# Patient Record
Sex: MALE | Race: WHITE | Employment: OTHER | URBAN - METROPOLITAN AREA
[De-identification: names, ages, dates, MRNs, and addresses within clinical notes are randomized per-mention and may not be internally consistent; named-entity substitution may affect disease eponyms.]

---

## 2020-04-13 ENCOUNTER — APPOINTMENT (OUTPATIENT)
Dept: CT IMAGING | Age: 60
End: 2020-04-13
Payer: MEDICARE

## 2020-04-13 ENCOUNTER — APPOINTMENT (OUTPATIENT)
Dept: GENERAL RADIOLOGY | Age: 60
End: 2020-04-13
Payer: MEDICARE

## 2020-04-13 ENCOUNTER — HOSPITAL ENCOUNTER (EMERGENCY)
Age: 60
Discharge: NURSING, REHAB OR LONG-TERM CARE | End: 2020-04-13
Attending: EMERGENCY MEDICINE
Payer: MEDICARE

## 2020-04-13 VITALS
RESPIRATION RATE: 18 BRPM | SYSTOLIC BLOOD PRESSURE: 149 MMHG | HEART RATE: 94 BPM | OXYGEN SATURATION: 94 % | DIASTOLIC BLOOD PRESSURE: 87 MMHG

## 2020-04-13 LAB
ANION GAP SERPL CALCULATED.3IONS-SCNC: 11 MMOL/L (ref 7–16)
BASOPHILS ABSOLUTE: 0.01 E9/L (ref 0–0.2)
BASOPHILS RELATIVE PERCENT: 0.2 % (ref 0–2)
BUN BLDV-MCNC: 29 MG/DL (ref 8–23)
BURR CELLS: ABNORMAL
CALCIUM SERPL-MCNC: 8.5 MG/DL (ref 8.6–10.2)
CHLORIDE BLD-SCNC: 96 MMOL/L (ref 98–107)
CHP ED QC CHECK: YES
CO2: 26 MMOL/L (ref 22–29)
CREAT SERPL-MCNC: 2.6 MG/DL (ref 0.7–1.2)
EOSINOPHILS ABSOLUTE: 0.04 E9/L (ref 0.05–0.5)
EOSINOPHILS RELATIVE PERCENT: 0.8 % (ref 0–6)
GFR AFRICAN AMERICAN: 30
GFR NON-AFRICAN AMERICAN: 25 ML/MIN/1.73
GLUCOSE BLD-MCNC: 126 MG/DL
GLUCOSE BLD-MCNC: 144 MG/DL
GLUCOSE BLD-MCNC: 166 MG/DL
GLUCOSE BLD-MCNC: 178 MG/DL (ref 74–99)
HCT VFR BLD CALC: 34.3 % (ref 37–54)
HEMOGLOBIN: 10.8 G/DL (ref 12.5–16.5)
IMMATURE GRANULOCYTES #: 0.03 E9/L
IMMATURE GRANULOCYTES %: 0.6 % (ref 0–5)
LYMPHOCYTES ABSOLUTE: 0.32 E9/L (ref 1.5–4)
LYMPHOCYTES RELATIVE PERCENT: 6.4 % (ref 20–42)
MCH RBC QN AUTO: 30.7 PG (ref 26–35)
MCHC RBC AUTO-ENTMCNC: 31.5 % (ref 32–34.5)
MCV RBC AUTO: 97.4 FL (ref 80–99.9)
METER GLUCOSE: 126 MG/DL (ref 74–99)
METER GLUCOSE: 144 MG/DL (ref 74–99)
METER GLUCOSE: 166 MG/DL (ref 74–99)
MONOCYTES ABSOLUTE: 0.3 E9/L (ref 0.1–0.95)
MONOCYTES RELATIVE PERCENT: 6 % (ref 2–12)
NEUTROPHILS ABSOLUTE: 4.28 E9/L (ref 1.8–7.3)
NEUTROPHILS RELATIVE PERCENT: 86 % (ref 43–80)
OVALOCYTES: ABNORMAL
PDW BLD-RTO: 13.2 FL (ref 11.5–15)
PLATELET # BLD: 126 E9/L (ref 130–450)
PMV BLD AUTO: 10.5 FL (ref 7–12)
POIKILOCYTES: ABNORMAL
POTASSIUM SERPL-SCNC: 4 MMOL/L (ref 3.5–5)
RBC # BLD: 3.52 E12/L (ref 3.8–5.8)
SODIUM BLD-SCNC: 133 MMOL/L (ref 132–146)
TROPONIN: 0.01 NG/ML (ref 0–0.03)
WBC # BLD: 5 E9/L (ref 4.5–11.5)

## 2020-04-13 PROCEDURE — 99285 EMERGENCY DEPT VISIT HI MDM: CPT

## 2020-04-13 PROCEDURE — 84484 ASSAY OF TROPONIN QUANT: CPT

## 2020-04-13 PROCEDURE — 82962 GLUCOSE BLOOD TEST: CPT

## 2020-04-13 PROCEDURE — 93005 ELECTROCARDIOGRAM TRACING: CPT | Performed by: STUDENT IN AN ORGANIZED HEALTH CARE EDUCATION/TRAINING PROGRAM

## 2020-04-13 PROCEDURE — 85025 COMPLETE CBC W/AUTO DIFF WBC: CPT

## 2020-04-13 PROCEDURE — 80048 BASIC METABOLIC PNL TOTAL CA: CPT

## 2020-04-13 PROCEDURE — 70450 CT HEAD/BRAIN W/O DYE: CPT

## 2020-04-13 PROCEDURE — 71045 X-RAY EXAM CHEST 1 VIEW: CPT

## 2020-04-13 ASSESSMENT — ENCOUNTER SYMPTOMS
ABDOMINAL PAIN: 0
COUGH: 0
VOMITING: 0
NAUSEA: 0
SHORTNESS OF BREATH: 0
CONSTIPATION: 0
COLOR CHANGE: 0
WHEEZING: 0
DIARRHEA: 0

## 2020-04-13 NOTE — ED NOTES
Pt brought in by EMS for possible stroke. EMS state blood glucose 29. 500cc D10 given, EMS state pt began to wake up and talk. Blood glucose 166 on arrival to ED.       Melissa Gibson RN  04/13/20 1946

## 2020-04-13 NOTE — ED NOTES
Bed: 14  Expected date:   Expected time:   Means of arrival:   Comments:  kina Aguila, RN  04/13/20 1933

## 2020-04-14 LAB
EKG ATRIAL RATE: 54 BPM
EKG P AXIS: 42 DEGREES
EKG P-R INTERVAL: 188 MS
EKG Q-T INTERVAL: 480 MS
EKG QRS DURATION: 146 MS
EKG QTC CALCULATION (BAZETT): 455 MS
EKG R AXIS: -60 DEGREES
EKG VENTRICULAR RATE: 54 BPM

## 2020-04-14 PROCEDURE — 93010 ELECTROCARDIOGRAM REPORT: CPT | Performed by: INTERNAL MEDICINE

## 2020-04-14 NOTE — ED PROVIDER NOTES
hernia is present. Musculoskeletal: Normal range of motion. General: No tenderness or deformity. Comments: Right leg amputation. Lymphadenopathy:      Cervical: No cervical adenopathy. Skin:     General: Skin is warm and dry. Capillary Refill: Capillary refill takes less than 2 seconds. Coloration: Skin is not pale. Findings: Rash (Bilateral lower extremity erythema, no obvious open leg ulcers.) present. No erythema. Neurological:      General: No focal deficit present. Mental Status: He is oriented to person, place, and time. Mental status is at baseline. Cranial Nerves: No cranial nerve deficit. Sensory: No sensory deficit. Psychiatric:         Behavior: Behavior normal.         Thought Content: Thought content normal.         Judgment: Judgment normal.          Procedures   --------------------------------------------- PAST HISTORY ---------------------------------------------  Past Medical History:  has no past medical history on file. Past Surgical History:  has no past surgical history on file. Social History:      Family History: family history is not on file. The patients home medications have been reviewed. Allergies: Patient has no known allergies.     -------------------------------------------------- RESULTS -------------------------------------------------  Labs:  Results for orders placed or performed during the hospital encounter of 04/13/20   CBC auto differential   Result Value Ref Range    WBC 5.0 4.5 - 11.5 E9/L    RBC 3.52 (L) 3.80 - 5.80 E12/L    Hemoglobin 10.8 (L) 12.5 - 16.5 g/dL    Hematocrit 34.3 (L) 37.0 - 54.0 %    MCV 97.4 80.0 - 99.9 fL    MCH 30.7 26.0 - 35.0 pg    MCHC 31.5 (L) 32.0 - 34.5 %    RDW 13.2 11.5 - 15.0 fL    Platelets 140 (L) 709 - 450 E9/L    MPV 10.5 7.0 - 12.0 fL   Basic Metabolic Panel   Result Value Ref Range    Sodium 133 132 - 146 mmol/L    Potassium 4.0 3.5 - 5.0 mmol/L    Chloride 96 (L) 98 - 107 recheck one more time for his blood glucose, and discharge if appropriate. [KS]      ED Course User Index  [KS] Aure ByrdDO         9:43 PM EDT  I have spoken with the patient and discussed todays results, in addition to providing specific details for the plan of care and counseling regarding the diagnosis and prognosis. Their questions are answered at this time and they are agreeable with the plan. I discussed at length with them reasons for immediate return here for re evaluation. They will followup with their primary care physician by calling their office on Monday.      --------------------------------- ADDITIONAL PROVIDER NOTES ---------------------------------  At this time the patient is without objective evidence of an acute process requiring hospitalization or inpatient management. They have remained hemodynamically stable throughout their entire ED visit and are stable for discharge with outpatient follow-up. The plan has been discussed in detail and they are aware of the specific conditions for emergent return, as well as the importance of follow-up. New Prescriptions    No medications on file       Diagnosis:  1. Hypoglycemia    2. Altered mental status, unspecified altered mental status type        Disposition:  Patient's disposition: Discharge to nursing home  Patient's condition is stable. MDM  Number of Diagnoses or Management Options  Altered mental status, unspecified altered mental status type:   Hypoglycemia:   Diagnosis management comments: Patient is a 58-year-old male who presented to the ED for hypoglycemia. Patient evaluated, rest comfortably otherwise no acute distress. His initial vitals were stable, and his glucose was 160s. Patient otherwise was resting comfortably. He had no focal neurologic deficits, NIH was 0. He was mildly lethargic when he came in, was responding to questions appropriately.   Patient was monitored in the ED, several repeat glucose checks

## 2020-09-03 ENCOUNTER — HOSPITAL ENCOUNTER (INPATIENT)
Age: 60
LOS: 8 days | Discharge: OTHER INSTITUTION | DRG: 378 | End: 2020-09-11
Attending: EMERGENCY MEDICINE | Admitting: INTERNAL MEDICINE
Payer: MEDICARE

## 2020-09-03 PROBLEM — F32.A DEPRESSION: Status: ACTIVE | Noted: 2020-09-03

## 2020-09-03 PROBLEM — E11.9 DIABETES MELLITUS (HCC): Status: ACTIVE | Noted: 2020-09-03

## 2020-09-03 PROBLEM — N17.9 ACUTE ON CHRONIC KIDNEY FAILURE (HCC): Status: ACTIVE | Noted: 2020-09-03

## 2020-09-03 PROBLEM — K92.2 GI BLEED: Status: ACTIVE | Noted: 2020-09-03

## 2020-09-03 PROBLEM — D62 ACUTE BLOOD LOSS ANEMIA: Status: ACTIVE | Noted: 2020-09-03

## 2020-09-03 PROBLEM — N18.9 ACUTE ON CHRONIC KIDNEY FAILURE (HCC): Status: ACTIVE | Noted: 2020-09-03

## 2020-09-03 PROBLEM — J44.9 COPD (CHRONIC OBSTRUCTIVE PULMONARY DISEASE) (HCC): Status: ACTIVE | Noted: 2020-09-03

## 2020-09-03 PROBLEM — F99 PSYCHIATRIC DISORDER: Status: ACTIVE | Noted: 2020-09-03

## 2020-09-03 PROBLEM — D61.818 PANCYTOPENIA (HCC): Status: ACTIVE | Noted: 2020-09-03

## 2020-09-03 PROBLEM — I50.9 CHF (CONGESTIVE HEART FAILURE) (HCC): Status: ACTIVE | Noted: 2020-09-03

## 2020-09-03 PROBLEM — Z72.0 TOBACCO ABUSE: Status: ACTIVE | Noted: 2020-09-03

## 2020-09-03 PROBLEM — E78.5 HLD (HYPERLIPIDEMIA): Status: ACTIVE | Noted: 2020-09-03

## 2020-09-03 PROBLEM — I10 ESSENTIAL HYPERTENSION: Status: ACTIVE | Noted: 2020-09-03

## 2020-09-03 LAB
ABO/RH: NORMAL
ANION GAP SERPL CALCULATED.3IONS-SCNC: 7 MMOL/L (ref 7–16)
ANISOCYTOSIS: ABNORMAL
ANTIBODY SCREEN: NORMAL
BASOPHILIC STIPPLING: ABNORMAL
BASOPHILS ABSOLUTE: 0.01 E9/L (ref 0–0.2)
BASOPHILS RELATIVE PERCENT: 0.3 % (ref 0–2)
BUN BLDV-MCNC: 57 MG/DL (ref 8–23)
CALCIUM SERPL-MCNC: 8.7 MG/DL (ref 8.6–10.2)
CHLORIDE BLD-SCNC: 99 MMOL/L (ref 98–107)
CO2: 29 MMOL/L (ref 22–29)
CREAT SERPL-MCNC: 4.3 MG/DL (ref 0.7–1.2)
EKG ATRIAL RATE: 74 BPM
EKG P AXIS: 36 DEGREES
EKG P-R INTERVAL: 184 MS
EKG Q-T INTERVAL: 412 MS
EKG QRS DURATION: 138 MS
EKG QTC CALCULATION (BAZETT): 457 MS
EKG R AXIS: -56 DEGREES
EKG T AXIS: 58 DEGREES
EKG VENTRICULAR RATE: 74 BPM
EOSINOPHILS ABSOLUTE: 0.1 E9/L (ref 0.05–0.5)
EOSINOPHILS RELATIVE PERCENT: 3.4 % (ref 0–6)
GFR AFRICAN AMERICAN: 17
GFR NON-AFRICAN AMERICAN: 14 ML/MIN/1.73
GLUCOSE BLD-MCNC: 101 MG/DL (ref 74–99)
HCT VFR BLD CALC: 20.6 % (ref 37–54)
HEMOGLOBIN: 6.3 G/DL (ref 12.5–16.5)
HYPOCHROMIA: ABNORMAL
IMMATURE GRANULOCYTES #: 0.01 E9/L
IMMATURE GRANULOCYTES %: 0.3 % (ref 0–5)
LYMPHOCYTES ABSOLUTE: 0.32 E9/L (ref 1.5–4)
LYMPHOCYTES RELATIVE PERCENT: 10.9 % (ref 20–42)
MCH RBC QN AUTO: 30.9 PG (ref 26–35)
MCHC RBC AUTO-ENTMCNC: 30.6 % (ref 32–34.5)
MCV RBC AUTO: 101 FL (ref 80–99.9)
METER GLUCOSE: 50 MG/DL (ref 74–99)
MONOCYTES ABSOLUTE: 0.24 E9/L (ref 0.1–0.95)
MONOCYTES RELATIVE PERCENT: 8.2 % (ref 2–12)
NEUTROPHILS ABSOLUTE: 2.25 E9/L (ref 1.8–7.3)
NEUTROPHILS RELATIVE PERCENT: 76.9 % (ref 43–80)
PDW BLD-RTO: 14.6 FL (ref 11.5–15)
PLATELET # BLD: 129 E9/L (ref 130–450)
PMV BLD AUTO: 10 FL (ref 7–12)
POIKILOCYTES: ABNORMAL
POLYCHROMASIA: ABNORMAL
POTASSIUM REFLEX MAGNESIUM: 4.4 MMOL/L (ref 3.5–5)
RBC # BLD: 2.04 E12/L (ref 3.8–5.8)
SARS-COV-2, NAAT: NOT DETECTED
SODIUM BLD-SCNC: 135 MMOL/L (ref 132–146)
TARGET CELLS: ABNORMAL
WBC # BLD: 2.9 E9/L (ref 4.5–11.5)

## 2020-09-03 PROCEDURE — U0002 COVID-19 LAB TEST NON-CDC: HCPCS

## 2020-09-03 PROCEDURE — 86901 BLOOD TYPING SEROLOGIC RH(D): CPT

## 2020-09-03 PROCEDURE — 6360000002 HC RX W HCPCS: Performed by: NURSE PRACTITIONER

## 2020-09-03 PROCEDURE — 36430 TRANSFUSION BLD/BLD COMPNT: CPT

## 2020-09-03 PROCEDURE — 80048 BASIC METABOLIC PNL TOTAL CA: CPT

## 2020-09-03 PROCEDURE — 6370000000 HC RX 637 (ALT 250 FOR IP): Performed by: INTERNAL MEDICINE

## 2020-09-03 PROCEDURE — P9016 RBC LEUKOCYTES REDUCED: HCPCS

## 2020-09-03 PROCEDURE — 85025 COMPLETE CBC W/AUTO DIFF WBC: CPT

## 2020-09-03 PROCEDURE — 82962 GLUCOSE BLOOD TEST: CPT

## 2020-09-03 PROCEDURE — 1200000000 HC SEMI PRIVATE

## 2020-09-03 PROCEDURE — 99285 EMERGENCY DEPT VISIT HI MDM: CPT

## 2020-09-03 PROCEDURE — 86920 COMPATIBILITY TEST SPIN: CPT

## 2020-09-03 PROCEDURE — 2580000003 HC RX 258: Performed by: EMERGENCY MEDICINE

## 2020-09-03 PROCEDURE — 2580000003 HC RX 258: Performed by: NURSE PRACTITIONER

## 2020-09-03 PROCEDURE — 99284 EMERGENCY DEPT VISIT MOD MDM: CPT

## 2020-09-03 PROCEDURE — C9113 INJ PANTOPRAZOLE SODIUM, VIA: HCPCS | Performed by: NURSE PRACTITIONER

## 2020-09-03 PROCEDURE — 86850 RBC ANTIBODY SCREEN: CPT

## 2020-09-03 PROCEDURE — 93005 ELECTROCARDIOGRAM TRACING: CPT | Performed by: EMERGENCY MEDICINE

## 2020-09-03 PROCEDURE — 86900 BLOOD TYPING SEROLOGIC ABO: CPT

## 2020-09-03 PROCEDURE — 93010 ELECTROCARDIOGRAM REPORT: CPT | Performed by: INTERNAL MEDICINE

## 2020-09-03 RX ORDER — TRIAMCINOLONE ACETONIDE 1 MG/G
CREAM TOPICAL 2 TIMES DAILY
Status: DISCONTINUED | OUTPATIENT
Start: 2020-09-03 | End: 2020-09-11 | Stop reason: HOSPADM

## 2020-09-03 RX ORDER — 0.9 % SODIUM CHLORIDE 0.9 %
20 INTRAVENOUS SOLUTION INTRAVENOUS ONCE
Status: COMPLETED | OUTPATIENT
Start: 2020-09-03 | End: 2020-09-03

## 2020-09-03 RX ORDER — CITALOPRAM 10 MG/1
20 TABLET ORAL DAILY
COMMUNITY

## 2020-09-03 RX ORDER — OYSTER SHELL CALCIUM WITH VITAMIN D 500; 200 MG/1; [IU]/1
1 TABLET, FILM COATED ORAL 2 TIMES DAILY
Status: ON HOLD | COMMUNITY
End: 2020-12-31 | Stop reason: HOSPADM

## 2020-09-03 RX ORDER — FENOFIBRATE 54 MG/1
54 TABLET ORAL NIGHTLY
Status: DISCONTINUED | OUTPATIENT
Start: 2020-09-03 | End: 2020-09-11 | Stop reason: HOSPADM

## 2020-09-03 RX ORDER — ONDANSETRON 2 MG/ML
4 INJECTION INTRAMUSCULAR; INTRAVENOUS EVERY 6 HOURS PRN
Status: DISCONTINUED | OUTPATIENT
Start: 2020-09-03 | End: 2020-09-11 | Stop reason: HOSPADM

## 2020-09-03 RX ORDER — DILTIAZEM HYDROCHLORIDE 240 MG/1
240 CAPSULE, EXTENDED RELEASE ORAL DAILY
COMMUNITY

## 2020-09-03 RX ORDER — CEFUROXIME AXETIL 500 MG/1
500 TABLET ORAL 2 TIMES DAILY
COMMUNITY
Start: 2020-09-03 | End: 2020-09-09

## 2020-09-03 RX ORDER — SODIUM CHLORIDE 0.9 % (FLUSH) 0.9 %
10 SYRINGE (ML) INJECTION EVERY 12 HOURS SCHEDULED
Status: DISCONTINUED | OUTPATIENT
Start: 2020-09-03 | End: 2020-09-11 | Stop reason: HOSPADM

## 2020-09-03 RX ORDER — SODIUM CHLORIDE 0.9 % (FLUSH) 0.9 %
10 SYRINGE (ML) INJECTION PRN
Status: DISCONTINUED | OUTPATIENT
Start: 2020-09-03 | End: 2020-09-11 | Stop reason: HOSPADM

## 2020-09-03 RX ORDER — SODIUM CHLORIDE 9 MG/ML
10 INJECTION INTRAVENOUS DAILY
Status: DISCONTINUED | OUTPATIENT
Start: 2020-09-03 | End: 2020-09-04

## 2020-09-03 RX ORDER — PROMETHAZINE HYDROCHLORIDE 25 MG/1
12.5 TABLET ORAL EVERY 6 HOURS PRN
Status: DISCONTINUED | OUTPATIENT
Start: 2020-09-03 | End: 2020-09-11 | Stop reason: HOSPADM

## 2020-09-03 RX ORDER — CHOLECALCIFEROL (VITAMIN D3) 50 MCG
4000 TABLET ORAL DAILY
Status: DISCONTINUED | OUTPATIENT
Start: 2020-09-04 | End: 2020-09-11 | Stop reason: HOSPADM

## 2020-09-03 RX ORDER — FUROSEMIDE 40 MG/1
40 TABLET ORAL 2 TIMES DAILY
Status: ON HOLD | COMMUNITY
Start: 2020-09-03 | End: 2020-09-05 | Stop reason: HOSPADM

## 2020-09-03 RX ORDER — POLYETHYLENE GLYCOL 3350 17 G/17G
17 POWDER, FOR SOLUTION ORAL DAILY PRN
Status: DISCONTINUED | OUTPATIENT
Start: 2020-09-03 | End: 2020-09-11 | Stop reason: HOSPADM

## 2020-09-03 RX ORDER — DOXEPIN HYDROCHLORIDE 25 MG/1
25 CAPSULE ORAL NIGHTLY
COMMUNITY

## 2020-09-03 RX ORDER — DOXEPIN HYDROCHLORIDE 25 MG/1
25 CAPSULE ORAL NIGHTLY
Status: DISCONTINUED | OUTPATIENT
Start: 2020-09-03 | End: 2020-09-11 | Stop reason: HOSPADM

## 2020-09-03 RX ORDER — CHOLECALCIFEROL (VITAMIN D3) 50 MCG
1 CAPSULE ORAL DAILY
Status: ON HOLD | COMMUNITY
End: 2020-12-31 | Stop reason: HOSPADM

## 2020-09-03 RX ORDER — INSULIN DETEMIR 100 [IU]/ML
32 INJECTION, SOLUTION SUBCUTANEOUS EVERY MORNING
COMMUNITY

## 2020-09-03 RX ORDER — ARIPIPRAZOLE 2 MG/1
2.5 TABLET ORAL DAILY
COMMUNITY
End: 2020-09-03

## 2020-09-03 RX ORDER — GLIPIZIDE 5 MG/1
5 TABLET ORAL DAILY
Status: ON HOLD | COMMUNITY
End: 2020-09-05 | Stop reason: HOSPADM

## 2020-09-03 RX ORDER — FUROSEMIDE 20 MG/1
20 TABLET ORAL 2 TIMES DAILY
COMMUNITY
Start: 2020-09-08

## 2020-09-03 RX ORDER — CITALOPRAM 20 MG/1
20 TABLET ORAL DAILY
Status: DISCONTINUED | OUTPATIENT
Start: 2020-09-04 | End: 2020-09-11 | Stop reason: HOSPADM

## 2020-09-03 RX ORDER — FENOFIBRATE 48 MG/1
48 TABLET, COATED ORAL NIGHTLY
COMMUNITY

## 2020-09-03 RX ORDER — ARIPIPRAZOLE 5 MG/1
2.5 TABLET ORAL DAILY
COMMUNITY

## 2020-09-03 RX ORDER — ACETAMINOPHEN 325 MG/1
650 TABLET ORAL EVERY 6 HOURS PRN
Status: DISCONTINUED | OUTPATIENT
Start: 2020-09-03 | End: 2020-09-11 | Stop reason: HOSPADM

## 2020-09-03 RX ORDER — DEXTROSE MONOHYDRATE 50 MG/ML
100 INJECTION, SOLUTION INTRAVENOUS PRN
Status: DISCONTINUED | OUTPATIENT
Start: 2020-09-03 | End: 2020-09-11 | Stop reason: HOSPADM

## 2020-09-03 RX ORDER — 0.9 % SODIUM CHLORIDE 0.9 %
20 INTRAVENOUS SOLUTION INTRAVENOUS ONCE
Status: DISCONTINUED | OUTPATIENT
Start: 2020-09-03 | End: 2020-09-11 | Stop reason: HOSPADM

## 2020-09-03 RX ORDER — TRIAMCINOLONE ACETONIDE 1 MG/G
CREAM TOPICAL 2 TIMES DAILY
COMMUNITY

## 2020-09-03 RX ORDER — ACETAMINOPHEN 650 MG/1
650 SUPPOSITORY RECTAL EVERY 6 HOURS PRN
Status: DISCONTINUED | OUTPATIENT
Start: 2020-09-03 | End: 2020-09-11 | Stop reason: HOSPADM

## 2020-09-03 RX ORDER — ASPIRIN 81 MG/1
81 TABLET ORAL DAILY
Status: ON HOLD | COMMUNITY
End: 2020-09-05 | Stop reason: HOSPADM

## 2020-09-03 RX ORDER — ARIPIPRAZOLE 5 MG/1
2.5 TABLET ORAL DAILY
Status: DISCONTINUED | OUTPATIENT
Start: 2020-09-04 | End: 2020-09-11 | Stop reason: HOSPADM

## 2020-09-03 RX ORDER — DEXTROSE MONOHYDRATE 25 G/50ML
12.5 INJECTION, SOLUTION INTRAVENOUS PRN
Status: DISCONTINUED | OUTPATIENT
Start: 2020-09-03 | End: 2020-09-11 | Stop reason: HOSPADM

## 2020-09-03 RX ORDER — CEFUROXIME AXETIL 500 MG/1
500 TABLET ORAL 2 TIMES DAILY
Status: DISCONTINUED | OUTPATIENT
Start: 2020-09-03 | End: 2020-09-11 | Stop reason: HOSPADM

## 2020-09-03 RX ORDER — PANTOPRAZOLE SODIUM 40 MG/10ML
40 INJECTION, POWDER, LYOPHILIZED, FOR SOLUTION INTRAVENOUS DAILY
Status: DISCONTINUED | OUTPATIENT
Start: 2020-09-03 | End: 2020-09-04

## 2020-09-03 RX ORDER — INSULIN GLARGINE 100 [IU]/ML
69 INJECTION, SOLUTION SUBCUTANEOUS EVERY MORNING
Status: DISCONTINUED | OUTPATIENT
Start: 2020-09-04 | End: 2020-09-11 | Stop reason: HOSPADM

## 2020-09-03 RX ORDER — NICOTINE POLACRILEX 4 MG
15 LOZENGE BUCCAL PRN
Status: DISCONTINUED | OUTPATIENT
Start: 2020-09-03 | End: 2020-09-11 | Stop reason: HOSPADM

## 2020-09-03 RX ORDER — DILTIAZEM HYDROCHLORIDE 240 MG/1
240 CAPSULE, COATED, EXTENDED RELEASE ORAL DAILY
Status: DISCONTINUED | OUTPATIENT
Start: 2020-09-04 | End: 2020-09-11 | Stop reason: HOSPADM

## 2020-09-03 RX ORDER — CLONIDINE HYDROCHLORIDE 0.1 MG/1
0.1 TABLET ORAL 3 TIMES DAILY
COMMUNITY

## 2020-09-03 RX ORDER — CLONIDINE HYDROCHLORIDE 0.1 MG/1
0.1 TABLET ORAL 3 TIMES DAILY
Status: DISCONTINUED | OUTPATIENT
Start: 2020-09-03 | End: 2020-09-11 | Stop reason: HOSPADM

## 2020-09-03 RX ADMIN — PANTOPRAZOLE SODIUM 40 MG: 40 INJECTION, POWDER, FOR SOLUTION INTRAVENOUS at 23:29

## 2020-09-03 RX ADMIN — SODIUM CHLORIDE 10 ML: 9 INJECTION INTRAMUSCULAR; INTRAVENOUS; SUBCUTANEOUS at 23:41

## 2020-09-03 RX ADMIN — SODIUM CHLORIDE 20 ML: 9 INJECTION, SOLUTION INTRAVENOUS at 18:24

## 2020-09-03 RX ADMIN — TRIAMCINOLONE ACETONIDE: 1 CREAM TOPICAL at 23:27

## 2020-09-03 RX ADMIN — DEXTROSE MONOHYDRATE 12.5 G: 25 INJECTION, SOLUTION INTRAVENOUS at 23:45

## 2020-09-03 RX ADMIN — SODIUM CHLORIDE, PRESERVATIVE FREE 10 ML: 5 INJECTION INTRAVENOUS at 23:29

## 2020-09-03 ASSESSMENT — PAIN SCALES - GENERAL: PAINLEVEL_OUTOF10: 0

## 2020-09-03 NOTE — ED PROVIDER NOTES
Patient is a 68-year-old male with past medical history of CHF, COPD, diabetes, hyperlipidemia, hypertension presenting to the emergency department with abnormal lab work. Symptoms severe in severity and constant since onset. They are worsened by nothing. Patient lives at a assisted living facility and states he gets lab work monthly. He was called today and stated that his lab work was low, he thinks it is \"blood count\" and instructed to come to the emergency department for evaluation. Patient states he has no current complaints and feels \"okay\". He denies headache, blurry vision, changes in vision or syncope. He does not feel lightheaded. Upon questioning patient does admit to having dark black stools intermittently for last few weeks. He states he has had this in the past but is never been worked up. He denies having history of colonoscopy or EGD. He has tried nothing for his symptoms. Nothing makes it better nothing makes them worse. He has paperwork with him of prior lab work and her hemoglobin drop was greater than 7 and 1 before that was greater than 10. He also looks to have a baseline creatinine of 3. He denies any abdominal pain, diarrhea, constipation, nausea, vomiting, chest pain or shortness of breath. Review of Systems   Constitutional: Negative for chills, fatigue and fever. HENT: Negative for congestion. Eyes: Negative for photophobia and visual disturbance. Respiratory: Negative for cough, choking and chest tightness. Gastrointestinal: Negative for abdominal pain, constipation, diarrhea, nausea and vomiting. Genitourinary: Negative for decreased urine volume and difficulty urinating. Musculoskeletal: Negative for back pain, neck pain and neck stiffness. Skin: Negative for rash and wound. Neurological: Negative for dizziness, syncope, weakness and light-headedness. All other systems reviewed and are negative.        Physical Exam  Vitals signs and nursing note reviewed. Constitutional:       Appearance: Normal appearance. He is well-developed. He is not ill-appearing. HENT:      Head: Normocephalic and atraumatic. Eyes:      Pupils: Pupils are equal, round, and reactive to light. Comments: Pale conjunctiva   Neck:      Musculoskeletal: Normal range of motion and neck supple. Cardiovascular:      Rate and Rhythm: Normal rate and regular rhythm. Pulses: Normal pulses. Heart sounds: Normal heart sounds. No murmur. Pulmonary:      Effort: Pulmonary effort is normal. No respiratory distress. Breath sounds: Normal breath sounds. No wheezing or rales. Abdominal:      General: Bowel sounds are normal.      Palpations: Abdomen is soft. Tenderness: There is no abdominal tenderness. There is no guarding or rebound. Genitourinary:     Rectum: Guaiac result positive. Comments: Melanotic stool on rectal exam, hemoccult positive  Musculoskeletal:      Comments: Right BKA   Skin:     General: Skin is warm and dry. Capillary Refill: Capillary refill takes less than 2 seconds. Neurological:      General: No focal deficit present. Mental Status: He is alert and oriented to person, place, and time. Cranial Nerves: No cranial nerve deficit. Motor: No weakness. Procedures     MDM  Number of Diagnoses or Management Options  BENJY (acute kidney injury) ([facility] [address]): Anemia, unspecified type:   Pancytopenia [facility]):   Patient presents emergency department for abnormal lab work. He was found to have low hemoglobin outpatient. Patient is pale on arrival and Hemoccult positive. Work-up performed patient found to be anemic with a hemoglobin of 6.3. He was transfused 1 unit of blood in the department. Patient had no other active complaints. He had no chest pain or shortness of breath. Rest lab work also demonstrated pancytopenia with a leukocopenia of 2.9 and platelets of 344.   Patient has no history of active cancer. Patient's creatinine is also 4.3. With paperwork provided with him in the room, baseline Cr is around 3. With The patient's newfound anemia and pancytopenia as well as Hemoccult-positive stool, he would benefit from inpatient evaluation and care. He has never had a colonoscopy or EGD peformed. Consult placed to on-call hospitalist and spoke with advanced practice provider on call. They accepted patient admission. Because patient is from a facility, rapid cover test performed was negative. EKG did not demonstrate any acute new changes. Educated patient on symptoms, diagnosis and need to stay in the hospital for evaluation care. He verbalized understanding is agreeable to plan. Patient was admitted.          ----------------------------------------------- PAST HISTORY --------------------------------------------  Past Medical History:  has a past medical history of CHF (congestive heart failure) ([facility] [address] COPD (chronic obstructive pulmonary disease) ([facility] [address] [facility] Hyperlipidemia, and Hypertension. Past Surgical History:  has no past surgical history on file. Social History:  reports that he has been smoking cigarettes. He started smoking about 54 years ago. He has never used smokeless tobacco. He reports previous alcohol use. He reports that he does not use drugs. Family History: family history includes Breast Cancer in his mother; High Blood Pressure in his father. The patients home medications have been reviewed. Allergies: Patient has no known allergies.     ------------------------------------------------ RESULTS ---------------------------------------------------    LABS:  Results for orders placed or performed during the hospital encounter of 09/03/20   CBC auto differential   Result Value Ref Range    WBC 2.9 (L) 4.5 - 11.5 E9/L    RBC 2.04 (L) 3.80 - 5.80 E12/L    Hemoglobin 6.3 (LL) 12.5 - 16.5 g/dL    Hematocrit 20.6 (L) 37.0 - 54.0 %    .0 (H) 80.0 - 99.9 fL    MCH 30.9 26.0 - 35.0 pg    MCHC 30.6 (L) 32.0 - 34.5 %    RDW 14.6 11.5 - 15.0 fL    Platelets 005 (L) 788 - 450 E9/L    MPV 10.0 7.0 - 12.0 fL    Neutrophils % 76.9 43.0 - 80.0 %    Immature Granulocytes % 0.3 0.0 - 5.0 %    Lymphocytes % 10.9 (L) 20.0 - 42.0 %    Monocytes % 8.2 2.0 - 12.0 %    Eosinophils % 3.4 0.0 - 6.0 %    Basophils % 0.3 0.0 - 2.0 %    Neutrophils Absolute 2.25 1.80 - 7.30 E9/L    Immature Granulocytes # 0.01 E9/L    Lymphocytes Absolute 0.32 (L) 1.50 - 4.00 E9/L    Monocytes Absolute 0.24 0.10 - 0.95 E9/L    Eosinophils Absolute 0.10 0.05 - 0.50 E9/L    Basophils Absolute 0.01 0.00 - 0.20 E9/L    Anisocytosis 1+     Polychromasia 1+     Hypochromia 1+     Poikilocytes 1+     Target Cells 1+     Basophilic Stippling 1+    Basic Metabolic Panel w/ Reflex to MG   Result Value Ref Range    Sodium 135 132 - 146 mmol/L    Potassium reflex Magnesium 4.4 3.5 - 5.0 mmol/L    Chloride 99 98 - 107 mmol/L    CO2 29 22 - 29 mmol/L    Anion Gap 7 7 - 16 mmol/L    Glucose 101 (H) 74 - 99 mg/dL    BUN 57 (H) 8 - 23 mg/dL    CREATININE 4.3 (H) 0.7 - 1.2 mg/dL    GFR Non-African American 14 >=60 mL/min/1.73    GFR African American 17     Calcium 8.7 8.6 - 10.2 mg/dL   COVID-19   Result Value Ref Range    SARS-CoV-2, NAAT Not Detected Not Detected   POCT Glucose   Result Value Ref Range    Meter Glucose 50 (L) 74 - 99 mg/dL   POCT Glucose   Result Value Ref Range    Meter Glucose 89 74 - 99 mg/dL   EKG 12 Lead   Result Value Ref Range    Ventricular Rate 74 BPM    Atrial Rate 74 BPM    P-R Interval 184 ms    QRS Duration 138 ms    Q-T Interval 412 ms    QTc Calculation (Bazett) 457 ms    P Axis 36 degrees    R Axis -56 degrees    T Axis 58 degrees   TYPE AND SCREEN   Result Value Ref Range    ABO/Rh A POS     Antibody Screen NEG    PREPARE RBC (CROSSMATCH), 1 Units   Result Value Ref Range    Product Code Blood Bank [id number]     Description Blood Bank Red Blood Cells, Leuko-reduced     Unit Number (CHOLECALCIFEROL) tablet 4,000 Units (has no administration in time range)   cefUROXime (CEFTIN) tablet 500 mg (500 mg Oral Not Given 9/3/20 2341)   citalopram (CELEXA) tablet 20 mg (has no administration in time range)   cloNIDine (CATAPRES) tablet 0.1 mg (0.1 mg Oral Not Given 9/3/20 2341)   dilTIAZem (CARDIZEM CD) extended release capsule 240 mg (has no administration in time range)   doxepin (SINEQUAN) capsule 25 mg (25 mg Oral Not Given 9/3/20 2340)   fenofibrate (TRICOR) tablet 54 mg (54 mg Oral Not Given 9/3/20 2340)   insulin glargine (LANTUS) injection vial 69 Units (has no administration in time range)   triamcinolone (KENALOG) 0.1 % cream ( Topical Given 9/3/20 2327)   glucose (GLUTOSE) 40 % oral gel 15 g (has no administration in time range)   dextrose 50 % IV solution (12.5 g Intravenous Given 9/3/20 2345)   glucagon (rDNA) injection 1 mg (has no administration in time range)   dextrose 5 % solution (has no administration in time range)   0.9 % sodium chloride bolus (0 mLs Intravenous Held 9/3/20 2341)   dextrose 5 % and 0.9 % sodium chloride infusion (has no administration in time range)   0.9 % sodium chloride bolus (0 mLs Intravenous Stopped 9/3/20 2128)       CONSULTATIONS:            Consultation:  I Spoke with April, APP on with Dr. Rosa M Simons (Medicine). Discussed case. They will admit this patient. Haile Mayfield PROCEDURES:            none. COUNSELING:   I have spoken with the patient and discussed todays results, in addition to providing specific details for the plan of care and counseling regarding the diagnosis and prognosis.     --------------------------------------- IMPRESSION & DISPOSITION --------------------------------     IMPRESSION(s):  1. Pancytopenia ([address]    2. Anemia, unspecified type    3.  BENJY (acute kidney injury) ([facility] [address]        This patient's ED course included: a personal history and physicial examination, re-evaluation prior to disposition, cardiac monitoring and continuous pulse oximetry    This patient has been closely monitored during their ED course. DISPOSITION:  Disposition: Admit to telemetry. Patient condition is stable. END OF PROVIDER NOTE.        BONITA BLOOD [facility] So[facility] Medical CenterSHELTON Gordon DO  Resident  09/04/20 5028

## 2020-09-04 ENCOUNTER — ANESTHESIA (OUTPATIENT)
Dept: ENDOSCOPY | Age: 60
DRG: 378 | End: 2020-09-04
Payer: MEDICARE

## 2020-09-04 ENCOUNTER — ANESTHESIA EVENT (OUTPATIENT)
Dept: ENDOSCOPY | Age: 60
DRG: 378 | End: 2020-09-04
Payer: MEDICARE

## 2020-09-04 VITALS
RESPIRATION RATE: 18 BRPM | SYSTOLIC BLOOD PRESSURE: 141 MMHG | DIASTOLIC BLOOD PRESSURE: 75 MMHG | OXYGEN SATURATION: 90 %

## 2020-09-04 PROBLEM — E78.5 HLD (HYPERLIPIDEMIA): Status: RESOLVED | Noted: 2020-09-03 | Resolved: 2020-09-04

## 2020-09-04 PROBLEM — N18.4 CKD (CHRONIC KIDNEY DISEASE) STAGE 4, GFR 15-29 ML/MIN (HCC): Chronic | Status: ACTIVE | Noted: 2020-09-04

## 2020-09-04 PROBLEM — D61.818 PANCYTOPENIA (HCC): Status: RESOLVED | Noted: 2020-09-03 | Resolved: 2020-09-04

## 2020-09-04 PROBLEM — N18.9 ACUTE ON CHRONIC KIDNEY FAILURE (HCC): Status: RESOLVED | Noted: 2020-09-03 | Resolved: 2020-09-04

## 2020-09-04 PROBLEM — E78.5 HYPERLIPIDEMIA LDL GOAL <100: Chronic | Status: ACTIVE | Noted: 2020-09-04

## 2020-09-04 PROBLEM — N17.9 ACUTE ON CHRONIC KIDNEY FAILURE (HCC): Status: RESOLVED | Noted: 2020-09-03 | Resolved: 2020-09-04

## 2020-09-04 PROBLEM — N17.9 ACUTE KIDNEY INJURY (HCC): Status: ACTIVE | Noted: 2020-09-04

## 2020-09-04 PROBLEM — E11.9 DIABETES MELLITUS (HCC): Status: RESOLVED | Noted: 2020-09-03 | Resolved: 2020-09-04

## 2020-09-04 LAB
ALBUMIN SERPL-MCNC: 3.3 G/DL (ref 3.5–5.2)
ALP BLD-CCNC: 41 U/L (ref 40–129)
ALT SERPL-CCNC: 11 U/L (ref 0–40)
ANION GAP SERPL CALCULATED.3IONS-SCNC: 8 MMOL/L (ref 7–16)
AST SERPL-CCNC: 17 U/L (ref 0–39)
BILIRUB SERPL-MCNC: 0.4 MG/DL (ref 0–1.2)
BLOOD BANK DISPENSE STATUS: NORMAL
BLOOD BANK DISPENSE STATUS: NORMAL
BLOOD BANK PRODUCT CODE: NORMAL
BLOOD BANK PRODUCT CODE: NORMAL
BPU ID: NORMAL
BPU ID: NORMAL
BUN BLDV-MCNC: 53 MG/DL (ref 8–23)
CALCIUM SERPL-MCNC: 8.5 MG/DL (ref 8.6–10.2)
CHLORIDE BLD-SCNC: 101 MMOL/L (ref 98–107)
CHLORIDE URINE RANDOM: 25 MMOL/L
CO2: 27 MMOL/L (ref 22–29)
CREAT SERPL-MCNC: 4.1 MG/DL (ref 0.7–1.2)
CREATININE URINE: 67 MG/DL (ref 40–278)
DESCRIPTION BLOOD BANK: NORMAL
DESCRIPTION BLOOD BANK: NORMAL
FERRITIN: 77 NG/ML
FOLATE: 12.7 NG/ML (ref 4.8–24.2)
GFR AFRICAN AMERICAN: 18
GFR NON-AFRICAN AMERICAN: 15 ML/MIN/1.73
GLUCOSE BLD-MCNC: 45 MG/DL (ref 74–99)
HAPTOGLOBIN: 130 MG/DL (ref 30–200)
HCT VFR BLD CALC: 24.3 % (ref 37–54)
HCT VFR BLD CALC: 26.7 % (ref 37–54)
HEMOGLOBIN: 7.4 G/DL (ref 12.5–16.5)
IMMATURE RETIC FRACT: 21.5 % (ref 2.3–13.4)
IRON SATURATION: 19 % (ref 20–55)
IRON: 66 MCG/DL (ref 59–158)
LACTATE DEHYDROGENASE: 228 U/L (ref 135–225)
MAGNESIUM: 2.4 MG/DL (ref 1.6–2.6)
MCH RBC QN AUTO: 30.7 PG (ref 26–35)
MCHC RBC AUTO-ENTMCNC: 30.5 % (ref 32–34.5)
MCV RBC AUTO: 100.8 FL (ref 80–99.9)
METER GLUCOSE: 108 MG/DL (ref 74–99)
METER GLUCOSE: 109 MG/DL (ref 74–99)
METER GLUCOSE: 371 MG/DL (ref 74–99)
METER GLUCOSE: 425 MG/DL (ref 74–99)
METER GLUCOSE: 439 MG/DL (ref 74–99)
METER GLUCOSE: 60 MG/DL (ref 74–99)
METER GLUCOSE: 65 MG/DL (ref 74–99)
METER GLUCOSE: 73 MG/DL (ref 74–99)
METER GLUCOSE: 87 MG/DL (ref 74–99)
METER GLUCOSE: 89 MG/DL (ref 74–99)
METER GLUCOSE: 94 MG/DL (ref 74–99)
METER GLUCOSE: 99 MG/DL (ref 74–99)
PDW BLD-RTO: 14.5 FL (ref 11.5–15)
PLATELET # BLD: 121 E9/L (ref 130–450)
PMV BLD AUTO: 10.1 FL (ref 7–12)
POTASSIUM SERPL-SCNC: 4.1 MMOL/L (ref 3.5–5)
PROTEIN PROTEIN: 333 MG/DL (ref 0–12)
PROTEIN/CREAT RATIO: 5
PROTEIN/CREAT RATIO: 5 (ref 0–0.2)
RBC # BLD: 2.41 E12/L (ref 3.8–5.8)
RETIC HGB EQUIVALENT: 29 PG (ref 28.2–36.6)
RETICULOCYTE ABSOLUTE COUNT: 0.13 E12/L
RETICULOCYTE COUNT PCT: 5.6 % (ref 0.4–1.9)
SODIUM BLD-SCNC: 136 MMOL/L (ref 132–146)
SODIUM URINE: 32 MMOL/L
TOTAL IRON BINDING CAPACITY: 343 MCG/DL (ref 250–450)
TOTAL PROTEIN: 6.5 G/DL (ref 6.4–8.3)
VITAMIN B-12: 586 PG/ML (ref 211–946)
WBC # BLD: 3.3 E9/L (ref 4.5–11.5)

## 2020-09-04 PROCEDURE — 2700000000 HC OXYGEN THERAPY PER DAY

## 2020-09-04 PROCEDURE — 2580000003 HC RX 258: Performed by: INTERNAL MEDICINE

## 2020-09-04 PROCEDURE — 6360000002 HC RX W HCPCS: Performed by: SURGERY

## 2020-09-04 PROCEDURE — 0DJ08ZZ INSPECTION OF UPPER INTESTINAL TRACT, VIA NATURAL OR ARTIFICIAL OPENING ENDOSCOPIC: ICD-10-PCS | Performed by: SURGERY

## 2020-09-04 PROCEDURE — 83615 LACTATE (LD) (LDH) ENZYME: CPT

## 2020-09-04 PROCEDURE — 3700000001 HC ADD 15 MINUTES (ANESTHESIA): Performed by: SURGERY

## 2020-09-04 PROCEDURE — 36415 COLL VENOUS BLD VENIPUNCTURE: CPT

## 2020-09-04 PROCEDURE — 83735 ASSAY OF MAGNESIUM: CPT

## 2020-09-04 PROCEDURE — 84300 ASSAY OF URINE SODIUM: CPT

## 2020-09-04 PROCEDURE — 2580000003 HC RX 258: Performed by: SURGERY

## 2020-09-04 PROCEDURE — 3700000000 HC ANESTHESIA ATTENDED CARE: Performed by: SURGERY

## 2020-09-04 PROCEDURE — C9113 INJ PANTOPRAZOLE SODIUM, VIA: HCPCS | Performed by: SURGERY

## 2020-09-04 PROCEDURE — 80053 COMPREHEN METABOLIC PANEL: CPT

## 2020-09-04 PROCEDURE — 6360000002 HC RX W HCPCS: Performed by: NURSE ANESTHETIST, CERTIFIED REGISTERED

## 2020-09-04 PROCEDURE — 84165 PROTEIN E-PHORESIS SERUM: CPT

## 2020-09-04 PROCEDURE — 85045 AUTOMATED RETICULOCYTE COUNT: CPT

## 2020-09-04 PROCEDURE — 3609017100 HC EGD: Performed by: SURGERY

## 2020-09-04 PROCEDURE — 82962 GLUCOSE BLOOD TEST: CPT

## 2020-09-04 PROCEDURE — 83550 IRON BINDING TEST: CPT

## 2020-09-04 PROCEDURE — 83540 ASSAY OF IRON: CPT

## 2020-09-04 PROCEDURE — 36430 TRANSFUSION BLD/BLD COMPNT: CPT

## 2020-09-04 PROCEDURE — 97165 OT EVAL LOW COMPLEX 30 MIN: CPT

## 2020-09-04 PROCEDURE — 2580000003 HC RX 258: Performed by: NURSE PRACTITIONER

## 2020-09-04 PROCEDURE — 7100000011 HC PHASE II RECOVERY - ADDTL 15 MIN: Performed by: SURGERY

## 2020-09-04 PROCEDURE — 82728 ASSAY OF FERRITIN: CPT

## 2020-09-04 PROCEDURE — 82436 ASSAY OF URINE CHLORIDE: CPT

## 2020-09-04 PROCEDURE — P9016 RBC LEUKOCYTES REDUCED: HCPCS

## 2020-09-04 PROCEDURE — 84156 ASSAY OF PROTEIN URINE: CPT

## 2020-09-04 PROCEDURE — 83010 ASSAY OF HAPTOGLOBIN QUANT: CPT

## 2020-09-04 PROCEDURE — 82607 VITAMIN B-12: CPT

## 2020-09-04 PROCEDURE — 6370000000 HC RX 637 (ALT 250 FOR IP): Performed by: INTERNAL MEDICINE

## 2020-09-04 PROCEDURE — 7100000010 HC PHASE II RECOVERY - FIRST 15 MIN: Performed by: SURGERY

## 2020-09-04 PROCEDURE — 82570 ASSAY OF URINE CREATININE: CPT

## 2020-09-04 PROCEDURE — 6370000000 HC RX 637 (ALT 250 FOR IP): Performed by: SURGERY

## 2020-09-04 PROCEDURE — 80074 ACUTE HEPATITIS PANEL: CPT

## 2020-09-04 PROCEDURE — 97161 PT EVAL LOW COMPLEX 20 MIN: CPT

## 2020-09-04 PROCEDURE — 82746 ASSAY OF FOLIC ACID SERUM: CPT

## 2020-09-04 PROCEDURE — 2580000003 HC RX 258: Performed by: NURSE ANESTHETIST, CERTIFIED REGISTERED

## 2020-09-04 PROCEDURE — 1200000000 HC SEMI PRIVATE

## 2020-09-04 PROCEDURE — 2709999900 HC NON-CHARGEABLE SUPPLY: Performed by: SURGERY

## 2020-09-04 PROCEDURE — 85027 COMPLETE CBC AUTOMATED: CPT

## 2020-09-04 RX ORDER — PANTOPRAZOLE SODIUM 40 MG/1
40 TABLET, DELAYED RELEASE ORAL
Qty: 60 TABLET | Refills: 2 | Status: SHIPPED | OUTPATIENT
Start: 2020-09-04 | End: 2020-12-05

## 2020-09-04 RX ORDER — SODIUM CHLORIDE 9 MG/ML
10 INJECTION INTRAVENOUS 2 TIMES DAILY
Status: DISCONTINUED | OUTPATIENT
Start: 2020-09-04 | End: 2020-09-05

## 2020-09-04 RX ORDER — PROPOFOL 10 MG/ML
INJECTION, EMULSION INTRAVENOUS PRN
Status: DISCONTINUED | OUTPATIENT
Start: 2020-09-04 | End: 2020-09-04 | Stop reason: SDUPTHER

## 2020-09-04 RX ORDER — SODIUM CHLORIDE 9 MG/ML
INJECTION, SOLUTION INTRAVENOUS CONTINUOUS PRN
Status: DISCONTINUED | OUTPATIENT
Start: 2020-09-04 | End: 2020-09-04 | Stop reason: SDUPTHER

## 2020-09-04 RX ORDER — PANTOPRAZOLE SODIUM 40 MG/10ML
40 INJECTION, POWDER, LYOPHILIZED, FOR SOLUTION INTRAVENOUS 2 TIMES DAILY
Status: DISCONTINUED | OUTPATIENT
Start: 2020-09-04 | End: 2020-09-05

## 2020-09-04 RX ORDER — DEXTROSE AND SODIUM CHLORIDE 5; .9 G/100ML; G/100ML
INJECTION, SOLUTION INTRAVENOUS CONTINUOUS
Status: DISCONTINUED | OUTPATIENT
Start: 2020-09-04 | End: 2020-09-04

## 2020-09-04 RX ORDER — SODIUM CHLORIDE 450 MG/100ML
INJECTION, SOLUTION INTRAVENOUS CONTINUOUS
Status: DISCONTINUED | OUTPATIENT
Start: 2020-09-04 | End: 2020-09-05

## 2020-09-04 RX ADMIN — PANTOPRAZOLE SODIUM 40 MG: 40 INJECTION, POWDER, FOR SOLUTION INTRAVENOUS at 20:13

## 2020-09-04 RX ADMIN — SODIUM CHLORIDE: 9 INJECTION, SOLUTION INTRAVENOUS at 08:52

## 2020-09-04 RX ADMIN — SODIUM CHLORIDE, PRESERVATIVE FREE 10 ML: 5 INJECTION INTRAVENOUS at 20:13

## 2020-09-04 RX ADMIN — CEFUROXIME AXETIL 500 MG: 500 TABLET ORAL at 20:14

## 2020-09-04 RX ADMIN — PROPOFOL 110 MG: 10 INJECTION, EMULSION INTRAVENOUS at 08:55

## 2020-09-04 RX ADMIN — DEXTROSE MONOHYDRATE 12.5 G: 25 INJECTION, SOLUTION INTRAVENOUS at 06:17

## 2020-09-04 RX ADMIN — DEXTROSE AND SODIUM CHLORIDE: 5; 900 INJECTION, SOLUTION INTRAVENOUS at 04:46

## 2020-09-04 RX ADMIN — INSULIN LISPRO 7 UNITS: 100 INJECTION, SOLUTION INTRAVENOUS; SUBCUTANEOUS at 20:17

## 2020-09-04 RX ADMIN — TRIAMCINOLONE ACETONIDE: 1 CREAM TOPICAL at 20:13

## 2020-09-04 RX ADMIN — DOXEPIN HYDROCHLORIDE 25 MG: 25 CAPSULE ORAL at 20:14

## 2020-09-04 RX ADMIN — INSULIN LISPRO 18 UNITS: 100 INJECTION, SOLUTION INTRAVENOUS; SUBCUTANEOUS at 18:10

## 2020-09-04 RX ADMIN — TRIAMCINOLONE ACETONIDE: 1 CREAM TOPICAL at 08:18

## 2020-09-04 RX ADMIN — SODIUM CHLORIDE, PRESERVATIVE FREE 10 ML: 5 INJECTION INTRAVENOUS at 08:19

## 2020-09-04 RX ADMIN — SODIUM CHLORIDE, PRESERVATIVE FREE 10 ML: 5 INJECTION INTRAVENOUS at 20:19

## 2020-09-04 RX ADMIN — OYSTER SHELL CALCIUM WITH VITAMIN D 1 TABLET: 500; 200 TABLET, FILM COATED ORAL at 20:17

## 2020-09-04 RX ADMIN — SODIUM CHLORIDE: 4.5 INJECTION, SOLUTION INTRAVENOUS at 19:25

## 2020-09-04 RX ADMIN — FENOFIBRATE 54 MG: 54 TABLET ORAL at 20:18

## 2020-09-04 RX ADMIN — DEXTROSE MONOHYDRATE 12.5 G: 25 INJECTION, SOLUTION INTRAVENOUS at 01:26

## 2020-09-04 RX ADMIN — PANTOPRAZOLE SODIUM 40 MG: 40 INJECTION, POWDER, FOR SOLUTION INTRAVENOUS at 08:19

## 2020-09-04 RX ADMIN — SODIUM CHLORIDE, PRESERVATIVE FREE 10 ML: 5 INJECTION INTRAVENOUS at 08:20

## 2020-09-04 RX ADMIN — CLONIDINE HYDROCHLORIDE 0.1 MG: 0.1 TABLET ORAL at 20:16

## 2020-09-04 RX ADMIN — CLONIDINE HYDROCHLORIDE 0.1 MG: 0.1 TABLET ORAL at 14:17

## 2020-09-04 RX ADMIN — DEXTROSE MONOHYDRATE 12.5 G: 25 INJECTION, SOLUTION INTRAVENOUS at 08:56

## 2020-09-04 ASSESSMENT — PAIN DESCRIPTION - LOCATION
LOCATION: ABDOMEN;THROAT

## 2020-09-04 ASSESSMENT — PAIN SCALES - GENERAL
PAINLEVEL_OUTOF10: 0

## 2020-09-04 ASSESSMENT — ENCOUNTER SYMPTOMS
NAUSEA: 0
BACK PAIN: 0
COUGH: 0
CONSTIPATION: 0
ABDOMINAL PAIN: 0
DIARRHEA: 0
CHEST TIGHTNESS: 0
PHOTOPHOBIA: 0
CHOKING: 0
VOMITING: 0

## 2020-09-04 ASSESSMENT — PAIN DESCRIPTION - PROGRESSION
CLINICAL_PROGRESSION: NOT CHANGED
CLINICAL_PROGRESSION: NOT CHANGED

## 2020-09-04 ASSESSMENT — PAIN DESCRIPTION - ORIENTATION: ORIENTATION: INNER

## 2020-09-04 ASSESSMENT — LIFESTYLE VARIABLES: SMOKING_STATUS: 1

## 2020-09-04 ASSESSMENT — PAIN DESCRIPTION - PAIN TYPE
TYPE: ACUTE PAIN
TYPE: ACUTE PAIN

## 2020-09-04 NOTE — PROGRESS NOTES
Physical Therapy    Facility/Department: [address] MED SURG/TELE  Initial Assessment    NAME: Guanako Elliott  : 1960  MRN: [id number]    Date of Service: 2020      Attending Provider:  Jaci Taylor MD    Evaluating PT:  Lasha Rees. Aury Green P.T. Room #:  5652/5036-E  Diagnosis:  Pancytopenia  Pertinent PMHx/PSHx:  R BKA  Precautions:  Falls, Hgb 7.4 20    SUBJECTIVE:    Pt lives at [facility] and reports he hasn't ambulated for about 1 year. He reports his prosthesis no longer fits and even when it did made it difficult for him to get around due to high energy demands. He states it is easier and more efficient to get around in his WC. Staff assists him with transfers. OBJECTIVE:   Initial Evaluation  Date: 20 Treatment Short Term/ Long Term   Goals   Was pt agreeable to Eval/treatment? yes     Does pt have pain? No c/o pain at this time. Bed Mobility  Rolling: Independent  Supine to sit: NA  Sit to supine: Independent  Scooting: Independent   Independent    Transfers Sit to stand: MIN A  Stand to sit: MIN A  Stand pivot: MIN A  SBA   Ambulation   NA  NA   WC propulsion NA  150 feet Independent    AM-PAC 6 Clicks 32/       BLE ROM is WFL. LLE strength is grossly 4/5. Edema:  None noted  Balance: sitting is Independent and standing with no AD is MIN A  Endurance: fair    ASSESSMENT:    Comments:  Pt was found sitting up in his WC and wanted to get back into bed. He stood with no AD and MIN A and was able to pivot to bed and then get into bed on his own. Pt appears to be near his functional baseline, but will keep pt on our caseload to try and prevent deconditioning while he is admitted. Pt was left supine in bed with call light left by patient. Pt's/ family goals   1. To go back to ECF. Patient and or family understand(s) diagnosis, prognosis, and plan of care. PLAN:    PT care will be provided in accordance with the objectives noted above.  Exercises and functional mobility practice will be used as well as appropriate assistive devices or modalities to obtain goals. Patient and family education will also be administered as needed. Frequency of treatments: 2-5x/week x 1-2 weeks. Time in  14:30  Time out  14:45    Evaluation Time includes thorough review of current medical information, gathering information on past medical history/social history and prior level of function, completion of standardized testing/informal observation of tasks, assessment of data and education on plan of care and goals. CPT codes:  [x] Low Complexity PT evaluation 64997  [] Moderate Complexity PT evaluation 05328  [] High Complexity PT evaluation 34293  [] PT Re-evaluation 58350  [] Gait training 87519 ** minutes  [] Manual therapy 42762 ** minutes  [] Therapeutic activities 43249 ** minutes  [] Therapeutic exercises 89946 ** minutes  [] Neuromuscular reeducation 20081 ** minutes     Peter Bañuelos., P.T.   License Number: [id number]

## 2020-09-04 NOTE — PROGRESS NOTES
Occupational Therapy  OCCUPATIONAL THERAPY INITIAL EVALUATION      Date:2020  Patient Name: Nely Ham  MRN: [id number]  : 1960  Room: [address] [place] [address]    Referring Provider: Kate Zamudio MD   Evaluating OT: Sancho Flanagan OTR/L - [id number]    AM-PAC Daily Activity Raw Score:       Diagnosis: Pancytopenia ([facility] [address] [W09.700]  Pertinent Medical History: COPD, R BKA, CHF, DM, HTN      Precautions: falls, R BKA (patient does not wear a prosthesis)    Home Living: Patient is a resident of [facility]. Prior Level of Function (PLOF): Per patient, he was mostly independent with ADLs, needed assistance with IADLs, and needed assistance with functional transfers prior to this hospitalization. Pain Level: No complaints of pain. Cognition: Patient alert and oriented x3. WFL command follow demonstrated. Patient pleasant, cooperative, and indicated his intent to return to [facility]. Memory: WFL   Sequencing: WFL   Problem Solving: WFL grossly   Judgement/Safety: WFL grossly    Functional Assessment:   Initial Eval Status  Date: 2020   Feeding Independent   Grooming Setup  (seated)   UB Dressing Setup   LB Dressing Min A   Bathing Min A   Toileting Min A   Bed Mobility  Not assessed. Functional Transfers Sit-to-Stand: CGA   from bedside chair  Stand-Pivot Transfer: Not assessed. Patient reported that staff members at the [facility] assist with functional transfers, as needed. Functional Mobility Not assessed. Patient uses manual wheelchair at the [facility]. Balance Sitting: Good  (at edge of chair)  Standing: Fair-  (without device)   Activity Tolerance Fair   Visual/  Perceptual WFL  Patient wears reading glasses, as needed. Strength: ROM: Additional Information:    R UE  WFL WFL    L UE WFL  WFL      Hearing: WFL  Sensation: Patient denied experiencing numbness/tingling in B UEs. Tone: WFL  Edema: No    Comments: RN approved patient's participation in OT evaluation.  Upon arrival, patient seated

## 2020-09-04 NOTE — ANESTHESIA POSTPROCEDURE EVALUATION
Department of Anesthesiology  Postprocedure Note    Patient: Robby Peterson  MRN: [id number]  YOB: 1960  Date of evaluation: 9/4/2020  Time:  11:52 AM     Procedure Summary     Date:  09/04/20 Room / Location:  [facility] 03 / [facility]    Anesthesia Start:  6555 Anesthesia Stop:  4658    Procedure:  EGD ESOPHAGOGASTRODUODENOSCOPY (N/A ) Diagnosis:  (unk)    Surgeon:  Rick Aaron MD Responsible Provider:  Jettie Bamberger, MD    Anesthesia Type:  MAC ASA Status:  4          Anesthesia Type: No value filed. Johanna Phase I:      Johanna Phase II: Johanna Score: 9    Last vitals: Reviewed and per EMR flowsheets.        Anesthesia Post Evaluation    Patient location during evaluation: PACU  Patient participation: complete - patient participated  Level of consciousness: awake  Airway patency: patent  Nausea & Vomiting: no vomiting and no nausea  Complications: no  Cardiovascular status: hemodynamically stable  Respiratory status: acceptable  Hydration status: stable

## 2020-09-04 NOTE — OP NOTE
Operative Note      Patient: Servando Aranda  YOB: 1960  MRN: [id number]    Date of Procedure: 9/4/2020    Pre-Op Diagnosis: melena, anemia    Post-Op Diagnosis: Gastritis, duodenitis, possible Mayen's        Procedure(s):  EGD ESOPHAGOGASTRODUODENOSCOPY    Surgeon(s):  Aury Tovar MD    Assistant:   * No surgical staff found *    Anesthesia: Monitor Anesthesia Care    Estimated Blood Loss (mL): 0    Complications: none    Specimens:   * No specimens in log *    Implants:  * No implants in log *      Drains: * No LDAs found *      BRIEF HISTORY:  This is a 62 y.o. male who presents with the complaint of melena and anemia. It was recommended the patient undergo an esophagogastrduodenoscopy for further evaluation. The risks/benefits/alternatives/expected outcomes were explained to the patient which include but are not limited to, bleeding, perforation and aspiration. The patient understands and agrees to proceed. PROCEDURE:  The patient was brought into the endoscopy suite and placed in the left lateral decubitus position. A bite block was placed in the patients mouth. After the initiation of LMAC anesthesia, an endoscope was inserted into the patient's mouth and passed into the esophagus and into the stomach. The scope was advanced through the pylorus into the first and second portion of the duodenum making the note of duodenitis. The scope was then withdrawn back into the stomach where it was retroflexed. There was no hiatal hernia noted. The scope was then straightened and stomach was visualized and appeared mildly inflamed. The stomach was desufflated. The scope was then withdrawn the entire length of the esophagus, making the note of possible mayen's esophagus at the GE junction without masses, ulcerations, or lesions noted. The scope was withdrawn entirely. The patient tolerated the procedure well and there were no complications.   The patient was taken to PACU in stable condition.     No Almanzar MD  9/4/2020    Electronically signed by No Almanzar MD on 9/4/2020 at 9:08 AM

## 2020-09-04 NOTE — CONSULTS
Department of Medicine  Division of Hematology/Oncology  Attending Consult Note      Reason for Consult: Pancytopenia  Requesting Physician:  Rosario Duncan    CHIEF COMPLAINT: Abnormal lab work    History Obtained From:  Patient and EMR    HISTORY OF PRESENT ILLNESS:      The patient is a 62 y.o. male with significant past medical history of CHF, COPD, diabetes, hyperlipidemia and hypertension who presents with abnormal labs. Patient lives at assisted living, he gets monthly lab work done. Patient had no plates upon presentation but does report that he has been having dark black stools intermittently the past few weeks. He has never had colonoscopy or EGD. He was Hemoccult positive in the ED. Labs on admission showed a WBC 2.9, hemoglobin 6.3, hematocrit 20.6, .0, platelets 091,, sodium 135, BUN 57, creatinine 4.3, calcium 8.7. Lab work in April showed a WBC 5.0, hemoglobin 10.8, hematocrit 34.3, MCV 97.4, platelets 584, BMP showed a BUN of 29 and creatinine 2.6. We were subsequently consulted for pancytopenia. Upon evaluation patient does report a history of smoking since he was 4years old. He smokes about a half a pack to 1 pack a day. Patient also reports a history alcohol abuse. This consisted of either liquor or whiskey 5 nights out of the week. He reports breast cancer in his mother, denies any other oncological or hematological malignancies he is aware of in his family. Past Medical History:        Diagnosis Date    CHF (congestive heart failure) (HCC)     COPD (chronic obstructive pulmonary disease) (HCC)     Diabetes mellitus ([facility] [address]     Hyperlipidemia     Hypertension        Past Surgical History:    History reviewed. No pertinent surgical history.     Current Medications:    Current Facility-Administered Medications: dextrose 5 % and 0.9 % sodium chloride infusion, , Intravenous, Continuous  pantoprazole (PROTONIX) injection 40 mg, 40 mg, Intravenous, BID **AND** sodium chloride (PF) 0.9 % injection 10 mL, 10 mL, Intravenous, BID  sodium chloride flush 0.9 % injection 10 mL, 10 mL, Intravenous, 2 times per day  sodium chloride flush 0.9 % injection 10 mL, 10 mL, Intravenous, PRN  acetaminophen (TYLENOL) tablet 650 mg, 650 mg, Oral, Q6H PRN **OR** acetaminophen (TYLENOL) suppository 650 mg, 650 mg, Rectal, Q6H PRN  polyethylene glycol (GLYCOLAX) packet 17 g, 17 g, Oral, Daily PRN  promethazine (PHENERGAN) tablet 12.5 mg, 12.5 mg, Oral, Q6H PRN **OR** ondansetron (ZOFRAN) injection 4 mg, 4 mg, Intravenous, Q6H PRN  ARIPiprazole (ABILIFY) tablet 2.5 mg, 2.5 mg, Oral, Daily  calcium-vitamin D 500-200 MG-UNIT per tablet 1 tablet, 1 tablet, Oral, BID  vitamin D (CHOLECALCIFEROL) tablet 4,000 Units, 4,000 Units, Oral, Daily  cefUROXime (CEFTIN) tablet 500 mg, 500 mg, Oral, BID  citalopram (CELEXA) tablet 20 mg, 20 mg, Oral, Daily  cloNIDine (CATAPRES) tablet 0.1 mg, 0.1 mg, Oral, TID  dilTIAZem (CARDIZEM CD) extended release capsule 240 mg, 240 mg, Oral, Daily  doxepin (SINEQUAN) capsule 25 mg, 25 mg, Oral, Nightly  fenofibrate (TRICOR) tablet 54 mg, 54 mg, Oral, Nightly  insulin glargine (LANTUS) injection vial 69 Units, 69 Units, Subcutaneous, QAM  triamcinolone (KENALOG) 0.1 % cream, , Topical, BID  glucose (GLUTOSE) 40 % oral gel 15 g, 15 g, Oral, PRN  dextrose 50 % IV solution, 12.5 g, Intravenous, PRN  glucagon (rDNA) injection 1 mg, 1 mg, Intramuscular, PRN  dextrose 5 % solution, 100 mL/hr, Intravenous, PRN  0.9 % sodium chloride bolus, 20 mL, Intravenous, Once    Allergies:  Patient has no known allergies.     Social History:   Social History     Socioeconomic History    Marital status: Single     Spouse name: Not on file    Number of children: Not on file    Years of education: Not on file    Highest education level: Not on file   Occupational History    Not on file   Social Needs    Financial resource strain: Not on file    Food insecurity     Worry: Not on file Inability: Not on file    Transportation needs     Medical: Not on file     Non-medical: Not on file   Tobacco Use    Smoking status: Current Every Day Smoker     Types: Cigarettes     Start date: 9/3/1966    Smokeless tobacco: Never Used    Tobacco comment: states he smokes 7 cigarettes a day   Substance and Sexual Activity    Alcohol use: Not Currently    Drug use: Never    Sexual activity: Never   Lifestyle    Physical activity     Days per week: Not on file     Minutes per session: Not on file    Stress: Not on file   Relationships    Social connections     Talks on phone: Not on file     Gets together: Not on file     Attends Scientologist service: Not on file     Active member of club or organization: Not on file     Attends meetings of clubs or organizations: Not on file     Relationship status: Not on file    Intimate partner violence     Fear of current or ex partner: Not on file     Emotionally abused: Not on file     Physically abused: Not on file     Forced sexual activity: Not on file   Other Topics Concern    Not on file   Social History Narrative    Not on file       Family History:         Problem Relation Age of Onset    Breast Cancer Mother     High Blood Pressure Father        REVIEW OF SYSTEMS:    CONSTITUTIONAL:  negative for fatigue, fevers, chills, sweats and weight loss  HEENT:  negative for hearing loss, epistaxis and sore throat  RESPIRATORY:  negative for cough with sputum, dyspnea, wheezing, hemoptysis and chest pain  CARDIOVASCULAR:  negative for chest pain, palpitations, PND, edema, syncope, dyspnea  GASTROINTESTINAL:  negative for nausea, vomiting, diarrhea, constipation, abdominal pain, jaundice, reflux, hematemesis and hemtochezia  GENITOURINARY:  negative for frequency, dysuria and urinary incontinence  INTEGUMENT/BREAST:  negative for rash and pruritus  HEMATOLOGIC/LYMPHATIC:  negative for easy bruising, bleeding, lymphadenopathy and petechiae  MUSCULOSKELETAL:

## 2020-09-04 NOTE — PROGRESS NOTES
Database complete. Medications reconciled. Care plans and education initiated. Right BKA and no prosthetic with patient. Uses wjheelchair and wears 2L 02 NC.  Has large ecchymotic area to left breast.

## 2020-09-04 NOTE — PROGRESS NOTES
Bariatric bed received, patient refused to stay in bed, complaint of uncomfortable and inability to breathe when lying in bed. Patient educated on importance of using this bed. Still refused. Original bed replaced, MITCH bed placed at end of calderon by elevators for .

## 2020-09-04 NOTE — PROGRESS NOTES
Call placed to Dr. Chaparro Johansen regarding patients BS at dinner time. Will await orders or call back. New order for high dose sliding scale.

## 2020-09-04 NOTE — CARE COORDINATION
Social Work / Discharge Planning : Patient was admitted from [facility]. SW placed call to patient Legal Guardian  Glenna Fleming [phone] and left . Patient LG is from [employer]. SW called Dillon Santacruz and left  with admission jannet [phone]. Await  response . Patient will NOT need pre-cert due to listed medicare. Patient COVID negative 09/03. Cassidy PENA [address] and transport forms completed. SW to follow.  Electronically signed by JS Thomson on 9/4/20 at 11:30 AM EDT

## 2020-09-04 NOTE — PLAN OF CARE
Problem: Falls - Risk of:  Goal: Will remain free from falls  Description: Will remain free from falls  9/4/2020 0359 by Branden Oakley RN  Outcome: Met This Shift  9/3/2020 2041 by Favio Hill RN  Outcome: Met This Shift  Goal: Absence of physical injury  Description: Absence of physical injury  9/4/2020 0359 by Branden Oakley RN  Outcome: Met This Shift  9/3/2020 2041 by Favio Hill RN  Outcome: Met This Shift     Problem: Skin Integrity:  Goal: Will show no infection signs and symptoms  Description: Will show no infection signs and symptoms  9/3/2020 2041 by Favio Hill RN  Outcome: Met This Shift  Goal: Absence of new skin breakdown  Description: Absence of new skin breakdown  9/3/2020 2041 by Favio Hill RN  Outcome: Met This Shift     Problem: Pain:  Goal: Pain level will decrease  Description: Pain level will decrease  9/4/2020 0359 by Branden Oakley RN  Outcome: Met This Shift  9/3/2020 2041 by Favio Hill RN  Outcome: Met This Shift  Goal: Control of acute pain  Description: Control of acute pain  9/3/2020 2041 by Favio Hill RN  Outcome: Met This Shift  Goal: Control of chronic pain  Description: Control of chronic pain  9/3/2020 2041 by Favio Hill RN  Outcome: Met This Shift

## 2020-09-04 NOTE — H&P
tablet by mouth 2 times daily  triamcinolone (KENALOG) 0.1 % cream, Apply topically 2 times daily Apply topically 2 times daily. legs  Cholecalciferol (HM VITAMIN D3) 100 MCG (4000 UT) CAPS, Take 1 tablet by mouth daily    Allergies:    Patient has no known allergies. Social History:    reports that he has been smoking cigarettes. He started smoking about 54 years ago. He has never used smokeless tobacco. He reports previous alcohol use. He reports that he does not use drugs. Family History:   family history includes Breast Cancer in his mother; High Blood Pressure in his father. REVIEW OF SYSTEMS:  As above in the HPI, otherwise negative    PHYSICAL EXAM:    Vitals:  BP (!) 174/68 Comment: Manual  Pulse 88   Temp 97.6 °F (36.4 °C) (Oral)   Resp 20   Ht 5' 10.5\" (1.791 m)   Wt 233 lb (105.7 kg)   SpO2 92%   BMI 32.96 kg/m²     General:  Awake, alert, oriented X 3. Frail appearing. HEENT:  Normocephalic, atraumatic. Pupils equal, round, reactive to light. No scleral icterus. No conjunctival injection. Normal lips, teeth, and gums. No nasal discharge. Neck:  Supple  Heart:  RRR, no murmurs, gallops, rubs  Lungs:  CTA bilaterally, bilat symmetrical expansion, no wheeze, rales, or rhonchi  Abdomen:  Obese. Bowel sounds present, soft, nontender, no masses, no organomegaly, no peritoneal signs  Extremities:  No clubbing, cyanosis, or edema  Skin:  Warm and dry, no open lesions or rash  Neuro:  Cranial nerves 2-12 intact, no focal deficits. General physical deconditioning.     Breast: deferred  Rectal: deferred  Genitalia:  deferred    LABS:    CBC with Differential:    Lab Results   Component Value Date    WBC 3.3 09/04/2020    RBC 2.41 09/04/2020    HGB 7.4 09/04/2020    HCT 24.3 09/04/2020     09/04/2020    .8 09/04/2020    MCH 30.7 09/04/2020    MCHC 30.5 09/04/2020    RDW 14.5 09/04/2020    LYMPHOPCT 10.9 09/03/2020    MONOPCT 8.2 09/03/2020    BASOPCT 0.3 09/03/2020    MONOSABS goal <100    CKD (chronic kidney disease) stage 4, GFR 15-29 ml/min (HCC)    Acute kidney injury ([facility] [address]         PLAN:    General surgery consulted for evaluation. Evaluate for acute blood loss anemia secondary to GI bleed on top of anemia of chronic disease (CKD). As above. Continue breathing treatments and supplemental oxygen. Discussed cessation. Euvolemic. Continue psychiatric medications. As above. Blood glucose ok, continue to adjust basal/bolus insulin therapy  Continue aggressive lipid therapy  Nephrology consulted. As above.   Pt/Ot evaluations for discharge planning    Chaka Turner MD  9:12 AM  9/4/2020

## 2020-09-04 NOTE — CONSULTS
GENERAL SURGERY  CONSULT NOTE            Date: 9/4/2020        Patient Name: Kassi Parikh     YOB: 1960      Age:  62 y.o. Consult by: Rosario GARCÍA  Consult to: Dr Asiya Huff  Reason:  GIB    Chief Complaint     Chief Complaint   Patient presents with    Other     abnormal labs     History Obtained From   patient    History of Present Illness   Kassi Parikh is a 62 y.o. male with tobacco use, CKD, and no prior scopes who presents for incidental anemia on monthly labwork. It is 6.3 on presentation here, and it was 10.8 this April. He is asymptomatic except for intermittent melena for few days. No hx of reflux/heartburn or NSAID use. Denies weight loss. Past Medical History     Past Medical History:   Diagnosis Date    [facility]congestive heart failure) ([facility] [address]     COPD [facility] disease) ([facility]     Diabetes mellitus (HCC)     Hyperlipidemia     Hypertension         Past Surgical History   History reviewed. No pertinent surgical history. Medications - Prior to Admission and Current Meds     Prior to Admission medications    Medication Sig Start Date End Date Taking?  Authorizing Provider   ARIPiprazole (ABILIFY) 5 MG tablet Take 2.5 mg by mouth daily   Yes Historical Provider, MD   aspirin 81 MG EC tablet Take 81 mg by mouth daily   Yes Historical Provider, MD   cefUROXime (CEFTIN) 500 MG tablet Take 500 mg by mouth 2 times daily 9/3/20 9/9/20 Yes Historical Provider, MD   citalopram (CELEXA) 10 MG tablet Take 20 mg by mouth daily   Yes Historical Provider, MD   cloNIDine (CATAPRES) 0.1 MG tablet Take 0.1 mg by mouth three times daily   Yes Historical Provider, MD   dilTIAZem (DILACOR XR) 240 MG extended release capsule Take 240 mg by mouth daily   Yes Historical Provider, MD   doxepin (SINEQUAN) 25 MG capsule Take 25 mg by mouth nightly   Yes Historical Provider, MD   fenofibrate (TRICOR) 48 MG tablet Take 48 mg by mouth nightly   Yes Historical Provider, MD (GLUTOSE) 40 % oral gel 15 g, PRN  dextrose 50 % IV solution, PRN  glucagon (rDNA) injection 1 mg, PRN  dextrose 5 % solution, PRN  0.9 % sodium chloride bolus, Once        Allergies   Patient has no known allergies. Social History     Social History     Tobacco History     Smoking Status  Current Every Day Smoker Smoking Start Date  9/3/1966 Smoking Tobacco Type  Cigarettes    Smokeless Tobacco Use  Never Used    Tobacco Comment  states he smokes 7 cigarettes a day          Alcohol History     Alcohol Use Status  Not Currently          Drug Use     Drug Use Status  Never          Sexual Activity     Sexually Active  Never                Family History     Family History   Problem Relation Age of Onset    Breast Cancer Mother     High Blood Pressure Father        Review of Systems   Pertinent ROS listed in HPI, all others negative    Physical Exam   BP (!) 171/74   Pulse 85   Temp 97.5 °F (36.4 °C) (Oral)   Resp 18   Ht 5' 10.5\" (1.791 m)   Wt 233 lb (105.7 kg)   SpO2 97%   BMI 32.96 kg/m²     GENERAL:  No acute distress. Alert and oriented. HEAD:  Normocephalic, atraumatic. EYES:  No scleral icterus. Conjugate gaze. ENT/NECK:  Trachea midline, no goiter. LUNGS:  No cough. Nonlabored breathing on 4LNC  CARDIOVASC:  Normal rate, no cyanosis. ABDOMEN:  Soft, non-distended, non-tender. LIMBS: R BKA, LLE edema with venous stasis changes. NEURO:  Face symmetric, moves all extremities  SKIN:  Warm, dry. Labs    CBC  Recent Labs     09/03/20  1643   WBC 2.9*   HGB 6.3*   HCT 20.6*   *     BMP  Recent Labs     09/03/20  1643      K 4.4   CL 99   CO2 29   BUN 57*   CREATININE 4.3*   CALCIUM 8.7     Liver Function  No results for input(s): AMYLASE, LIPASE, BILITOT, BILIDIR, AST, ALT, ALKPHOS, PROT, LABALBU in the last 72 hours. No results for input(s): LACTATE in the last 72 hours. No results for input(s): INR, PTT in the last 72 hours.     Invalid input(s): PT    Imaging/Diagnostics

## 2020-09-04 NOTE — CONSULTS
[facility] MD Jennifer Ybarra, MD Venice Pinto, MD Shakira Wilkins, MD Jose Bland, RUTH Read  Consultation  Patient's Name: Kin Rice  11:52 AM  9/4/2020    Nephrologist: Jennifer Baca MD    Reason for Consult: BENJY, CKD  Requesting Physician:  Jayant Evans DO    Chief Complaint: Fatigue, anemia    History Obtained From: Patient, chart    History of Present Ilness:    Mr. Izabela Childs is a pleasant 58-year-old gentleman who is a resident at assisted living and has a past medical history notable for diabetes, dyslipidemia, hypertension, smoking, COPD and history of alcohol abuse. Denies known renal disease. Routine laboratory studies revealed pancytopenia including WBC 2.9, hemoglobin 6.3, platelets 479, as well as elevated BUN/creatinine 57 and 4.3, respectively. Last known BUN/creatinine were 29 and 2.6, respectively, in April of this year. He is a rather vague historian, does not know medications he takes a much about his underlying medical diseases. He is received 2 units PRBCs. He underwent EGD today revealing duodenitis, gastritis, but no site of acute bleed. He is for colonoscopy in the near future. He denies NSAIDs. No recent course of antimicrobials known to him. Denies lightheadedness, chest pain or palpitation, shortness of breath such as exertion, cough wheeze or sputum production. No recent fever or chill. Does have fatigue generalized weakness. No recent diarrhea, constipation, melena hematochezia though he does note some dark stools recently. No dysuria or hematuria, no urinary hesitancy or other changes urinary habits. No flank pain. Denies mental or visual status changes.     Past Medical History:   Diagnosis Date    CHF (congestive heart failure) (HCC)     COPD (chronic obstructive pulmonary disease) (HCC)     Diabetes mellitus (HCC)     Hyperlipidemia     Hypertension        Past Surgical History: 10.5\" (1.791 m)   Wt 233 lb (105.7 kg)   SpO2 94%   BMI 32.96 kg/m²   Obese white gentleman who appears older than his stated age, in no apparent distress  NC/AT EOMI sclera and conjunctiva are clear and anicteric mucous membranes are moist  Neck soft supple trachea midline no carotid bruit no JVD  CTA bilateral  Regular rhythm normal S1 and S2, no murmur, no rub  Abdomen soft nontender nondistended normal active bowel sounds  Distal extremities reveal skin change consistent with chronic venous stasis, mild chronic-type edema, though no pitting edema  Pulses 1+x4  Moves all 4 extremities  Cranial nerves II through XII grossly intact  Awake alert appropriate, interactive  Besides skin changes in distal lower extremities as noted above, no rash or lesion on visible portions of integument    Data:   Labs:  CBC with Differential:    Lab Results   Component Value Date    WBC 3.3 09/04/2020    RBC 2.41 09/04/2020    HGB 7.4 09/04/2020    HCT 24.3 09/04/2020     09/04/2020    .8 09/04/2020    MCH 30.7 09/04/2020    MCHC 30.5 09/04/2020    RDW 14.5 09/04/2020    LYMPHOPCT 10.9 09/03/2020    MONOPCT 8.2 09/03/2020    BASOPCT 0.3 09/03/2020    MONOSABS 0.24 09/03/2020    LYMPHSABS 0.32 09/03/2020    EOSABS 0.10 09/03/2020    BASOSABS 0.01 09/03/2020     CMP:    Lab Results   Component Value Date     09/04/2020    K 4.1 09/04/2020    K 4.4 09/03/2020     09/04/2020    CO2 27 09/04/2020    BUN 53 09/04/2020    CREATININE 4.1 09/04/2020    GFRAA 18 09/04/2020    LABGLOM 15 09/04/2020    GLUCOSE 45 09/04/2020    PROT 6.5 09/04/2020    LABALBU 3.3 09/04/2020    CALCIUM 8.5 09/04/2020    BILITOT 0.4 09/04/2020    ALKPHOS 41 09/04/2020    AST 17 09/04/2020    ALT 11 09/04/2020     Ionized Calcium:  No results found for: IONCA  Magnesium:    Lab Results   Component Value Date    MG 2.4 09/04/2020         Imaging:  No orders to display       Assessment  1.  Acute kidney injury, presumptively hemodynamically

## 2020-09-04 NOTE — ANESTHESIA PRE PROCEDURE
Department of Anesthesiology  Preprocedure Note       Name:  Sujata Miller   Age:  62 y.o.  :  1960                                          MRN:  [id number]         Date:  2020      Surgeon: Celina Lawson):  Donald Pearce MD    Procedure: Procedure(s):  EGD ESOPHAGOGASTRODUODENOSCOPY    Medications prior to admission:   Prior to Admission medications    Medication Sig Start Date End Date Taking? Authorizing Provider   ARIPiprazole (ABILIFY) 5 MG tablet Take 2.5 mg by mouth daily   Yes Historical Provider, MD   aspirin 81 MG EC tablet Take 81 mg by mouth daily   Yes Historical Provider, MD   cefUROXime (CEFTIN) 500 MG tablet Take 500 mg by mouth 2 times daily 9/3/20 9/9/20 Yes Historical Provider, MD   citalopram (CELEXA) 10 MG tablet Take 20 mg by mouth daily   Yes Historical Provider, MD   cloNIDine (CATAPRES) 0.1 MG tablet Take 0.1 mg by mouth three times daily   Yes Historical Provider, MD   dilTIAZem (DILACOR XR) 240 MG extended release capsule Take 240 mg by mouth daily   Yes Historical Provider, MD   doxepin (SINEQUAN) 25 MG capsule Take 25 mg by mouth nightly   Yes Historical Provider, MD   fenofibrate (TRICOR) 48 MG tablet Take 48 mg by mouth nightly   Yes Historical Provider, MD   glipiZIDE (GLUCOTROL) 5 MG tablet Take 5 mg by mouth daily   Yes Historical Provider, MD   furosemide (LASIX) 40 MG tablet Take 40 mg by mouth 2 times daily 40 bid until 20. 9/3/20 9/7/20 Yes Historical Provider, MD   furosemide (LASIX) 20 MG tablet Take 20 mg by mouth 2 times daily Starts 20  Yes Historical Provider, MD   insulin detemir (LEVEMIR) 100 UNIT/ML injection vial Inject 69 Units into the skin every morning   Yes Historical Provider, MD   calcium-vitamin D (OSCAL-500) 500-200 MG-UNIT per tablet Take 1 tablet by mouth 2 times daily   Yes Historical Provider, MD   triamcinolone (KENALOG) 0.1 % cream Apply topically 2 times daily Apply topically 2 times daily.  legs   Yes Historical Provider, mg  0.1 mg Oral TID April RoslynRICKY Boyd - CNP        dilTIAZem (CARDIZEM CD) extended release capsule 240 mg  240 mg Oral Daily April RICKY Hartman - CNP        doxepin [facility]) capsule 25 mg  25 mg Oral Nightly April RICKY Hartman CNP        fenofibrate (TRICOR) tablet 54 mg  54 mg Oral Nightly April RICKY Hartman CNP        insulin glargine (LANTUS) injection vial 69 Units  69 Units Subcutaneous QAM April RICKY Hartman - AMY        triamcinolone (KENALOG) 0.1 % cream   Topical BID Liz Valdovinos MD        glucose (GLUTOSE) 40 % oral gel 15 g  15 g Oral PRN April RICKY Hartman CNP        dextrose 50 % IV solution  12.5 g Intravenous PRN April RoslynRICKY Landaverde - CNP   12.5 g at 09/04/20 0617    glucagon (rDNA) injection 1 mg  1 mg Intramuscular PRN April RICKY Hartman CNP        dextrose 5 % solution  100 mL/hr Intravenous PRN April RICKY Hartman - CNP        0.9 % sodium chloride bolus  20 mL Intravenous Once April RICKY Hartman - CNP   Stopped at 09/03/20 2341       Allergies:  No Known Allergies    Problem List:    Patient Active Problem List   Diagnosis Code    Pancytopenia ([facility] D61.818    GI bleed K92.2    Acute blood loss anemia D62    Acute on chronic kidney failure (HCC) N17.9, N18.9    Essential hypertension I10    COPD (chronic obstructive pulmonary disease) ([facility] J44.9    Diabetes mellitus ([facility] E11.9    Tobacco abuse Z72.0    HLD (hyperlipidemia) E78.5    CHF (congestive heart failure) ([facility] I50.9    Depression F32.9    Psychiatric disorder F99       Past Medical History:        Diagnosis Date    CHF (congestive heart failure) (HCC)     COPD (chronic obstructive pulmonary disease) ([facility]     Diabetes mellitus ([facility]     Hyperlipidemia     Hypertension        Past Surgical History:  History reviewed. No pertinent surgical history.     Social History:    Social History     Tobacco Use  Smoking status: Current Every Day Smoker     Types: Cigarettes     Start date: 9/3/1966    Smokeless tobacco: Never Used    Tobacco comment: states he smokes 7 cigarettes a day   Substance Use Topics    Alcohol use: Not Currently                                Ready to quit: Not Answered  Counseling given: Not Answered  Comment: states he smokes 7 cigarettes a day      Vital Signs (Current):   Vitals:    09/04/20 0615 09/04/20 0800 09/04/20 0825 09/04/20 0830   BP:  (!) 174/68     Pulse:  88     Resp:  20     Temp:  97.6 °F (36.4 °C)     TempSrc:  Oral     SpO2: 95% 96% (!) 88% 92%   Weight:       Height:                                                  BP Readings from Last 3 Encounters:   09/04/20 (!) 174/68   04/13/20 (!) 149/87       NPO Status:  greater than 8 hours                                                                               BMI:   Wt Readings from Last 3 Encounters:   09/03/20 233 lb (105.7 kg)     Body mass index is 32.96 kg/m². CBC:   Lab Results   Component Value Date    WBC 3.3 09/04/2020    RBC 2.41 09/04/2020    HGB 7.4 09/04/2020    HCT 24.3 09/04/2020    .8 09/04/2020    RDW 14.5 09/04/2020     09/04/2020       CMP:   Lab Results   Component Value Date     09/04/2020    K 4.1 09/04/2020    K 4.4 09/03/2020     09/04/2020    CO2 27 09/04/2020    BUN 53 09/04/2020    CREATININE 4.1 09/04/2020    GFRAA 18 09/04/2020    LABGLOM 15 09/04/2020    GLUCOSE 45 09/04/2020    PROT 6.5 09/04/2020    CALCIUM 8.5 09/04/2020    BILITOT 0.4 09/04/2020    ALKPHOS 41 09/04/2020    AST 17 09/04/2020    ALT 11 09/04/2020       POC Tests: No results for input(s): POCGLU, POCNA, POCK, POCCL, POCBUN, POCHEMO, POCHCT in the last 72 hours.     Coags: No results found for: PROTIME, INR, APTT    HCG (If Applicable): No results found for: PREGTESTUR, PREGSERUM, HCG, HCGQUANT     ABGs: No results found for: PHART, PO2ART, LWF3VRP, MGL0KJJ, BEART, L3YTWAZQ     Type & Screen (If Applicable):  No results found for: LABABO, LABRH    Drug/Infectious Status (If Applicable):  No results found for: HIV, HEPCAB    COVID-19 Screening (If Applicable):   Lab Results   Component Value Date    COVID19 Not Detected 09/03/2020         Anesthesia Evaluation  Patient summary reviewed no history of anesthetic complications:   Airway: Mallampati: III  TM distance: <3 FB     Mouth opening: < 3 FB Dental:    (+) edentulous      Pulmonary:   (+) COPD:  decreased breath sounds,  current smoker                           Cardiovascular:    (+) hypertension:, CHF:, hyperlipidemia        Rhythm: regular  Rate: normal                    Neuro/Psych:   (+) psychiatric history:depression/anxiety             GI/Hepatic/Renal:   (+) renal disease: CRI,          ROS comment: GI bleed. Endo/Other:    (+) Diabetes, blood dyscrasia (pancytopenia)::., .                 Abdominal:   (+) obese,         Vascular: negative vascular ROS. Anesthesia Plan      MAC     ASA 4     (Backup GA if needed)  Induction: intravenous. Anesthetic plan and risks discussed with patient. Plan discussed with CRNA.                   Fiorella Russo MD   9/4/2020

## 2020-09-05 PROBLEM — I50.9 CHF (CONGESTIVE HEART FAILURE) (HCC): Status: RESOLVED | Noted: 2020-09-03 | Resolved: 2020-09-05

## 2020-09-05 LAB
ALBUMIN SERPL-MCNC: 3.2 G/DL (ref 3.5–5.2)
ALP BLD-CCNC: 44 U/L (ref 40–129)
ALT SERPL-CCNC: 11 U/L (ref 0–40)
ANION GAP SERPL CALCULATED.3IONS-SCNC: 7 MMOL/L (ref 7–16)
AST SERPL-CCNC: 14 U/L (ref 0–39)
BASOPHILIC STIPPLING: ABNORMAL
BASOPHILS ABSOLUTE: 0.01 E9/L (ref 0–0.2)
BASOPHILS RELATIVE PERCENT: 0.3 % (ref 0–2)
BILIRUB SERPL-MCNC: 0.3 MG/DL (ref 0–1.2)
BLOOD BANK DISPENSE STATUS: NORMAL
BLOOD BANK PRODUCT CODE: NORMAL
BPU ID: NORMAL
BUN BLDV-MCNC: 45 MG/DL (ref 8–23)
CALCIUM SERPL-MCNC: 8.2 MG/DL (ref 8.6–10.2)
CHLORIDE BLD-SCNC: 102 MMOL/L (ref 98–107)
CO2: 25 MMOL/L (ref 22–29)
CREAT SERPL-MCNC: 3.9 MG/DL (ref 0.7–1.2)
DESCRIPTION BLOOD BANK: NORMAL
EOSINOPHILS ABSOLUTE: 0.1 E9/L (ref 0.05–0.5)
EOSINOPHILS RELATIVE PERCENT: 2.6 % (ref 0–6)
GFR AFRICAN AMERICAN: 19
GFR NON-AFRICAN AMERICAN: 16 ML/MIN/1.73
GLUCOSE BLD-MCNC: 205 MG/DL (ref 74–99)
HCT VFR BLD CALC: 24.2 % (ref 37–54)
HCT VFR BLD CALC: 30.1 % (ref 37–54)
HEMOGLOBIN: 7.4 G/DL (ref 12.5–16.5)
HEMOGLOBIN: 9.2 G/DL (ref 12.5–16.5)
HYPOCHROMIA: ABNORMAL
IMMATURE GRANULOCYTES #: 0.04 E9/L
IMMATURE GRANULOCYTES %: 1 % (ref 0–5)
LYMPHOCYTES ABSOLUTE: 0.34 E9/L (ref 1.5–4)
LYMPHOCYTES RELATIVE PERCENT: 8.8 % (ref 20–42)
MAGNESIUM: 2.3 MG/DL (ref 1.6–2.6)
MCH RBC QN AUTO: 31.2 PG (ref 26–35)
MCHC RBC AUTO-ENTMCNC: 30.6 % (ref 32–34.5)
MCV RBC AUTO: 102.1 FL (ref 80–99.9)
METER GLUCOSE: 146 MG/DL (ref 74–99)
METER GLUCOSE: 153 MG/DL (ref 74–99)
METER GLUCOSE: 230 MG/DL (ref 74–99)
METER GLUCOSE: 264 MG/DL (ref 74–99)
METER GLUCOSE: 88 MG/DL (ref 74–99)
MONOCYTES ABSOLUTE: 0.3 E9/L (ref 0.1–0.95)
MONOCYTES RELATIVE PERCENT: 7.8 % (ref 2–12)
NEUTROPHILS ABSOLUTE: 3.07 E9/L (ref 1.8–7.3)
NEUTROPHILS RELATIVE PERCENT: 79.5 % (ref 43–80)
PDW BLD-RTO: 14.5 FL (ref 11.5–15)
PLATELET # BLD: 119 E9/L (ref 130–450)
PMV BLD AUTO: 10.3 FL (ref 7–12)
POLYCHROMASIA: ABNORMAL
POTASSIUM SERPL-SCNC: 4.8 MMOL/L (ref 3.5–5)
RBC # BLD: 2.37 E12/L (ref 3.8–5.8)
SODIUM BLD-SCNC: 134 MMOL/L (ref 132–146)
TOTAL PROTEIN: 6.3 G/DL (ref 6.4–8.3)
WBC # BLD: 3.9 E9/L (ref 4.5–11.5)

## 2020-09-05 PROCEDURE — 2700000000 HC OXYGEN THERAPY PER DAY

## 2020-09-05 PROCEDURE — 6370000000 HC RX 637 (ALT 250 FOR IP): Performed by: SURGERY

## 2020-09-05 PROCEDURE — P9016 RBC LEUKOCYTES REDUCED: HCPCS

## 2020-09-05 PROCEDURE — 36415 COLL VENOUS BLD VENIPUNCTURE: CPT

## 2020-09-05 PROCEDURE — 83735 ASSAY OF MAGNESIUM: CPT

## 2020-09-05 PROCEDURE — 82962 GLUCOSE BLOOD TEST: CPT

## 2020-09-05 PROCEDURE — 85018 HEMOGLOBIN: CPT

## 2020-09-05 PROCEDURE — 85014 HEMATOCRIT: CPT

## 2020-09-05 PROCEDURE — 80053 COMPREHEN METABOLIC PANEL: CPT

## 2020-09-05 PROCEDURE — 85025 COMPLETE CBC W/AUTO DIFF WBC: CPT

## 2020-09-05 PROCEDURE — 2580000003 HC RX 258: Performed by: INTERNAL MEDICINE

## 2020-09-05 PROCEDURE — 2580000003 HC RX 258: Performed by: SURGERY

## 2020-09-05 PROCEDURE — 1200000000 HC SEMI PRIVATE

## 2020-09-05 PROCEDURE — 36430 TRANSFUSION BLD/BLD COMPNT: CPT

## 2020-09-05 PROCEDURE — 6370000000 HC RX 637 (ALT 250 FOR IP): Performed by: INTERNAL MEDICINE

## 2020-09-05 RX ORDER — PANTOPRAZOLE SODIUM 40 MG/1
40 TABLET, DELAYED RELEASE ORAL
Status: DISCONTINUED | OUTPATIENT
Start: 2020-09-05 | End: 2020-09-11 | Stop reason: HOSPADM

## 2020-09-05 RX ORDER — 0.9 % SODIUM CHLORIDE 0.9 %
20 INTRAVENOUS SOLUTION INTRAVENOUS ONCE
Status: COMPLETED | OUTPATIENT
Start: 2020-09-05 | End: 2020-09-05

## 2020-09-05 RX ADMIN — ACETAMINOPHEN 650 MG: 325 TABLET ORAL at 10:46

## 2020-09-05 RX ADMIN — CLONIDINE HYDROCHLORIDE 0.1 MG: 0.1 TABLET ORAL at 14:13

## 2020-09-05 RX ADMIN — OYSTER SHELL CALCIUM WITH VITAMIN D 1 TABLET: 500; 200 TABLET, FILM COATED ORAL at 20:45

## 2020-09-05 RX ADMIN — CITALOPRAM HYDROBROMIDE 20 MG: 20 TABLET ORAL at 10:08

## 2020-09-05 RX ADMIN — FENOFIBRATE 54 MG: 54 TABLET ORAL at 20:45

## 2020-09-05 RX ADMIN — CLONIDINE HYDROCHLORIDE 0.1 MG: 0.1 TABLET ORAL at 20:45

## 2020-09-05 RX ADMIN — ARIPIPRAZOLE 2.5 MG: 5 TABLET ORAL at 10:07

## 2020-09-05 RX ADMIN — DILTIAZEM HYDROCHLORIDE 240 MG: 240 CAPSULE, COATED, EXTENDED RELEASE ORAL at 10:08

## 2020-09-05 RX ADMIN — INSULIN LISPRO 6 UNITS: 100 INJECTION, SOLUTION INTRAVENOUS; SUBCUTANEOUS at 12:20

## 2020-09-05 RX ADMIN — INSULIN GLARGINE 69 UNITS: 100 INJECTION, SOLUTION SUBCUTANEOUS at 09:45

## 2020-09-05 RX ADMIN — CEFUROXIME AXETIL 500 MG: 500 TABLET ORAL at 20:45

## 2020-09-05 RX ADMIN — CLONIDINE HYDROCHLORIDE 0.1 MG: 0.1 TABLET ORAL at 10:08

## 2020-09-05 RX ADMIN — DOXEPIN HYDROCHLORIDE 25 MG: 25 CAPSULE ORAL at 20:45

## 2020-09-05 RX ADMIN — SODIUM CHLORIDE, PRESERVATIVE FREE 10 ML: 5 INJECTION INTRAVENOUS at 10:10

## 2020-09-05 RX ADMIN — CHOLECALCIFEROL TAB 50 MCG (2000 UNIT) 4000 UNITS: 50 TAB at 10:09

## 2020-09-05 RX ADMIN — OYSTER SHELL CALCIUM WITH VITAMIN D 1 TABLET: 500; 200 TABLET, FILM COATED ORAL at 10:08

## 2020-09-05 RX ADMIN — SODIUM CHLORIDE 20 ML: 9 INJECTION, SOLUTION INTRAVENOUS at 14:00

## 2020-09-05 RX ADMIN — INSULIN LISPRO 2 UNITS: 100 INJECTION, SOLUTION INTRAVENOUS; SUBCUTANEOUS at 20:46

## 2020-09-05 RX ADMIN — CEFUROXIME AXETIL 500 MG: 500 TABLET ORAL at 10:08

## 2020-09-05 RX ADMIN — TRIAMCINOLONE ACETONIDE: 1 CREAM TOPICAL at 20:45

## 2020-09-05 RX ADMIN — TRIAMCINOLONE ACETONIDE: 1 CREAM TOPICAL at 10:07

## 2020-09-05 ASSESSMENT — PAIN DESCRIPTION - DESCRIPTORS: DESCRIPTORS: HEADACHE

## 2020-09-05 ASSESSMENT — PAIN DESCRIPTION - LOCATION: LOCATION: HEAD

## 2020-09-05 ASSESSMENT — PAIN - FUNCTIONAL ASSESSMENT: PAIN_FUNCTIONAL_ASSESSMENT: PREVENTS OR INTERFERES SOME ACTIVE ACTIVITIES AND ADLS

## 2020-09-05 ASSESSMENT — PAIN DESCRIPTION - FREQUENCY: FREQUENCY: INTERMITTENT

## 2020-09-05 ASSESSMENT — PAIN DESCRIPTION - PAIN TYPE: TYPE: ACUTE PAIN

## 2020-09-05 ASSESSMENT — PAIN SCALES - GENERAL
PAINLEVEL_OUTOF10: 6
PAINLEVEL_OUTOF10: 0
PAINLEVEL_OUTOF10: 0

## 2020-09-05 ASSESSMENT — PAIN DESCRIPTION - ONSET: ONSET: ON-GOING

## 2020-09-05 ASSESSMENT — PAIN DESCRIPTION - PROGRESSION: CLINICAL_PROGRESSION: NOT CHANGED

## 2020-09-05 NOTE — PROGRESS NOTES
Subjective:    He feels tired but overall doing better compared to yesterday. He has mild exertional dyspnea. No chest pains. No nausea vomiting. No abdominal pain. No blood in stools. No diarrhea. Objective:    BP (!) 184/86   Pulse 89   Temp 97.6 °F (36.4 °C) (Axillary)   Resp 20   Ht 5' 10.5\" (1.791 m)   Wt 233 lb (105.7 kg)   SpO2 96%   BMI 32.96 kg/m²     General: Obese. Not in apparent distress. On O2 nasal cannula. HEENT: No thrush or mucositis  Heart:  RRR, no murmurs  Lungs: Decreased breath sounds bilaterally. No rales or rhonchi. Abd: Significantly obese. Unable to palpate the liver or spleen.   Extrem: Right below-knee amputation  Skin: Intact, no petechia or purpura    CBC with Differential:    Lab Results   Component Value Date    WBC 3.9 09/05/2020    RBC 2.37 09/05/2020    HGB 7.4 09/05/2020    HCT 24.2 09/05/2020     09/05/2020    .1 09/05/2020    MCH 31.2 09/05/2020    MCHC 30.6 09/05/2020    RDW 14.5 09/05/2020    LYMPHOPCT 8.8 09/05/2020    MONOPCT 7.8 09/05/2020    BASOPCT 0.3 09/05/2020    MONOSABS 0.30 09/05/2020    LYMPHSABS 0.34 09/05/2020    EOSABS 0.10 09/05/2020    BASOSABS 0.01 09/05/2020     CMP:    Lab Results   Component Value Date     09/05/2020    K 4.8 09/05/2020    K 4.4 09/03/2020     09/05/2020    CO2 25 09/05/2020    BUN 45 09/05/2020    CREATININE 3.9 09/05/2020    GFRAA 19 09/05/2020    LABGLOM 16 09/05/2020    GLUCOSE 205 09/05/2020    PROT 6.3 09/05/2020    LABALBU 3.2 09/05/2020    CALCIUM 8.2 09/05/2020    BILITOT 0.3 09/05/2020    ALKPHOS 44 09/05/2020    AST 14 09/05/2020    ALT 11 09/05/2020              Current Facility-Administered Medications:     pantoprazole (PROTONIX) tablet 40 mg, 40 mg, Oral, QAM ACEm MD    insulin lispro (HUMALOG) injection vial 0-18 Units, 0-18 Units, Subcutaneous, TID WCEm MD, 18 Units at 09/04/20 1810    insulin lispro (HUMALOG) injection vial 0-9 Units, 0-9 Units, Subcutaneous, Nightly, Michelle Guidry MD, 7 Units at 09/04/20 2017    sodium chloride flush 0.9 % injection 10 mL, 10 mL, Intravenous, 2 times per day, Myah DOSHI MD, 10 mL at 09/05/20 1010    sodium chloride flush 0.9 % injection 10 mL, 10 mL, Intravenous, PRN, Saad Barrera MD    acetaminophen (TYLENOL) tablet 650 mg, 650 mg, Oral, Q6H PRN, 650 mg at 09/05/20 1046 **OR** acetaminophen (TYLENOL) suppository 650 mg, 650 mg, Rectal, Q6H PRN, Saad Barrera MD    polyethylene glycol (GLYCOLAX) packet 17 g, 17 g, Oral, Daily PRN, Saad Barrera MD    promethazine (PHENERGAN) tablet 12.5 mg, 12.5 mg, Oral, Q6H PRN **OR** ondansetron (ZOFRAN) injection 4 mg, 4 mg, Intravenous, Q6H PRN, Saad Barrera MD    ARIPiprazole (ABILIFY) tablet 2.5 mg, 2.5 mg, Oral, Daily, Autumn Ysabel DOSHI MD, 2.5 mg at 09/05/20 1007    calcium-vitamin D 500-200 MG-UNIT per tablet 1 tablet, 1 tablet, Oral, BID, Autumn Ysabel DOSHI MD, 1 tablet at 09/05/20 1008    vitamin D (CHOLECALCIFEROL) tablet 4,000 Units, 4,000 Units, Oral, Daily, Autumn Ysabel DOSHI MD, 4,000 Units at 09/05/20 1009    cefUROXime (CEFTIN) tablet 500 mg, 500 mg, Oral, BID, Autumn Ysabel DOSHI MD, 500 mg at 09/05/20 1008    citalopram (CELEXA) tablet 20 mg, 20 mg, Oral, Daily, Autumn Single MD TICO, 20 mg at 09/05/20 1008    cloNIDine (CATAPRES) tablet 0.1 mg, 0.1 mg, Oral, TID, Autumn Single MD TICO, 0.1 mg at 09/05/20 1008    dilTIAZem (CARDIZEM CD) extended release capsule 240 mg, 240 mg, Oral, Daily, Myah Single MD TICO, 240 mg at 09/05/20 1008    doxepin (SINEQUAN) capsule 25 mg, 25 mg, Oral, Nightly, Myah Single MD TICO, 25 mg at 09/04/20 2014    fenofibrate (TRICOR) tablet 54 mg, 54 mg, Oral, Nightly, Autumn Single MD TICO, 54 mg at 09/04/20 2018    insulin glargine (LANTUS) injection vial 69 Units, 69 Units, Subcutaneous, QAM, Saad Barrera MD    triamcinolone (KENALOG) 0.1 % cream, , Topical, BID, Maura Thomas Yakelin Hartman MD    glucose (GLUTOSE) 40 % oral gel 15 g, 15 g, Oral, PRN, Gary Eng MD    dextrose 50 % IV solution, 12.5 g, Intravenous, PRN, Opal Elina DOSHI MD, 12.5 g at 09/04/20 0856    glucagon (rDNA) injection 1 mg, 1 mg, Intramuscular, PRN, Gary Eng MD    dextrose 5 % solution, 100 mL/hr, Intravenous, PRN, Gary Eng MD    0.9 % sodium chloride bolus, 20 mL, Intravenous, Once, Gary Eng MD, Stopped at 09/03/20 2341    Assessment:    Principal Problem:    GI bleed  Active Problems:    Acute blood loss anemia    Essential hypertension    COPD (chronic obstructive pulmonary disease) ([facility])    Tobacco abuse    Depression    Psychiatric disorder    Diabetes mellitus type 2, uncontrolled ([facility])    Hyperlipidemia LDL goal <100    CKD (chronic kidney disease) stage 4, GFR 15-29 ml/min ([facility])    Acute kidney injury ([address]  Resolved Problems:    Pancytopenia ([address]    Acute on chronic kidney failure (HCC)    Diabetes mellitus ([address]    HLD (hyperlipidemia)    CHF (congestive heart failure) ([address]  57-year-old male with history of chronic kidney disease, baseline creatinine around 3 presented from assisted living facility for abnormal labs. He was found to be pancytopenic on admission. He was also found to be Hemoccult positive, patient does report black tarry stools the past couple weeks. EGD done yesterday showed possible Walters's esophagus at the GE junction. Stomach mildly inflamed. Duodenitis. Plan:  Anemia is multifactorial including anemia of  Chronic disease, possible GI blood loss and chronic kidney insufficiency. Transfuse 1 unit of PRBC. Keep hemoglobin above 7.5 g/dL. Patient also has mild leukopenia and thrombocytopenia. Doubt he has underlying bone marrow disorder but a bone marrow biopsy may be needed in the future if there is further worsening of cytopenias over time. .  Check liver spleen ultrasound.   Follow-up with Dr. Gus Hull as an outpatient.       Electronically signed by Brandi Hutton MD on 9/5/2020 at 11:05 AM

## 2020-09-05 NOTE — DISCHARGE INSTR - COC
Continuity of Care Form    Patient Name: Bob Galan   :  1960  MRN:  [id number]    Admit date:  9/3/2020  Discharge date:  ***    Code Status Order: Full Code   Advance Directives:   Advance Care Flowsheet Documentation     Date/Time Healthcare Directive Type of Healthcare Directive Copy in [address] Agent's Name Healthcare Agent's Phone Number    20  Yes, patient has an advance directive for healthcare treatment  --  --  [address]  [phone] / [phone]          Admitting Physician:  Celestina Bello MD  PCP: Ba Williamson DO    Discharging Nurse: [facility] Unit/Room#: 9084/2095-P  Discharging Unit Phone Number: [phone]  Emergency Contact:   Extended Emergency Contact Information  Primary Emergency Contact: [address] Phone: [phone]  Work Phone: [phone]  Relation: Legal Guardian    Past Surgical History:  Past Surgical History:   Procedure Laterality Date    UPPER GASTROINTESTINAL ENDOSCOPY N/A 2020    EGD ESOPHAGOGASTRODUODENOSCOPY performed by Seth Courtney MD at [address]       Immunization History: There is no immunization history on file for this patient.     Active Problems:  Patient Active Problem List   Diagnosis Code    GI bleed K92.2    Acute blood loss anemia D62    Essential hypertension I10    COPD (chronic obstructive pulmonary disease) ([facility] J44.9    Tobacco abuse Z72.0    Depression F32.9    Psychiatric disorder F99    Diabetes mellitus type 2, uncontrolled ([facility] E11.65    Hyperlipidemia LDL goal <100 E78.5    CKD (chronic kidney disease) stage 4, GFR 15-29 ml/min ([facility]) N18.4    Acute kidney injury ([facility]) N17.9       Isolation/Infection:   Isolation          No Isolation        Patient Infection Status     Infection Onset Added Last Indicated Last Indicated By Review Planned Expiration Resolved Resolved By    None active    Resolved COVID-19 Rule Out 20 COVID-19 (Ordered)   20 Rule-Out Test Resulted          Nurse Assessment:  Last Vital Signs: BP (!) 184/86   Pulse 89   Temp 97.6 °F (36.4 °C) (Axillary)   Resp 20   Ht 5' 10.5\" (1.791 m)   Wt 233 lb (105.7 kg)   SpO2 96%   BMI 32.96 kg/m²     Last documented pain score (0-10 scale): Pain Level: 0  Last Weight:   Wt Readings from Last 1 Encounters:   20 233 lb (105.7 kg)     Mental Status:  oriented and alert    IV Access:  - None    Nursing Mobility/ADLs:  Walking   Dependent  Transfer  Assisted  Bathing  Assisted  Dressing  Assisted  Toileting  Assisted  Feeding  Independent  Med Admin  Assisted  Med Delivery   whole    Wound Care Documentation and Therapy:        Elimination:  Continence:   · Bowel: Yes  · Bladder: Yes  Urinary Catheter: None   Colostomy/Ileostomy/Ileal Conduit: No       Date of Last BM: ***    Intake/Output Summary (Last 24 hours) at 2020 09  Last data filed at 2020 194  Gross per 24 hour   Intake 100 ml   Output 150 ml   Net -50 ml     I/O last 3 completed shifts: In: 200 [I.V.:200]  Out: 150 [Urine:150]    Safety Concerns: At Risk for Falls    Impairments/Disabilities:      None    Nutrition Therapy:  Current Nutrition Therapy:   - Oral Diet:  Carb Control 4 carbs/meal (1800kcals/day)    Routes of Feeding: Oral  Liquids: Thin Liquids  Daily Fluid Restriction: no  Last Modified Barium Swallow with Video (Video Swallowing Test): not done    Treatments at the Time of Hospital Discharge:   Respiratory Treatments: ***  Oxygen Therapy:  is on oxygen at 3 L/min per nasal cannula.   Ventilator:    - No ventilator support    Rehab Therapies: {THERAPEUTIC INTERVENTION:1027807589}  Weight Bearing Status/Restrictions: [address] [employer]}  Other Medical Equipment (for information only, NOT a DME order):  {EQUIPMENT:[id number]}  Other Treatments: ***    Patient's personal belongings (please select all that are sent

## 2020-09-05 NOTE — PROGRESS NOTES
Subjective: The patient is awake and alert. Sitting up on edge of bed. Feels much better than presentation. Status post EGD (9/4/2020). No problems overnight. Denies chest pain, angina, and dyspnea. Denies abdominal pain. Tolerating diet. No nausea or vomiting. Objective:    BP (!) 184/86   Pulse 89   Temp 97.6 °F (36.4 °C) (Axillary)   Resp 20   Ht 5' 10.5\" (1.791 m)   Wt 233 lb (105.7 kg)   SpO2 96%   BMI 32.96 kg/m²     Current medications that patient is taking have been reviewed. Heart:  RRR, no murmurs, gallops, or rubs.   Lungs:  CTA bilaterally, no wheeze, rales or rhonchi  Abd: bowel sounds present, soft, nontender, nondistended, no masses  Extrem:  No cyanosis or edema    CBC with Differential:    Lab Results   Component Value Date    WBC 3.3 09/04/2020    RBC 2.41 09/04/2020    HGB 7.4 09/04/2020    HCT 26.7 09/04/2020     09/04/2020    .8 09/04/2020    MCH 30.7 09/04/2020    MCHC 30.5 09/04/2020    RDW 14.5 09/04/2020    LYMPHOPCT 10.9 09/03/2020    MONOPCT 8.2 09/03/2020    BASOPCT 0.3 09/03/2020    MONOSABS 0.24 09/03/2020    LYMPHSABS 0.32 09/03/2020    EOSABS 0.10 09/03/2020    BASOSABS 0.01 09/03/2020     CMP:    Lab Results   Component Value Date     09/04/2020    K 4.1 09/04/2020    K 4.4 09/03/2020     09/04/2020    CO2 27 09/04/2020    BUN 53 09/04/2020    CREATININE 4.1 09/04/2020    GFRAA 18 09/04/2020    LABGLOM 15 09/04/2020    GLUCOSE 45 09/04/2020    PROT 6.5 09/04/2020    LABALBU 3.3 09/04/2020    CALCIUM 8.5 09/04/2020    BILITOT 0.4 09/04/2020    ALKPHOS 41 09/04/2020    AST 17 09/04/2020    ALT 11 09/04/2020     BMP:    Lab Results   Component Value Date     09/04/2020    K 4.1 09/04/2020    K 4.4 09/03/2020     09/04/2020    CO2 27 09/04/2020    BUN 53 09/04/2020    LABALBU 3.3 09/04/2020    CREATININE 4.1 09/04/2020    CALCIUM 8.5 09/04/2020    GFRAA 18 09/04/2020    LABGLOM 15 09/04/2020    GLUCOSE 45 09/04/2020

## 2020-09-06 ENCOUNTER — APPOINTMENT (OUTPATIENT)
Dept: ULTRASOUND IMAGING | Age: 60
DRG: 378 | End: 2020-09-06
Payer: MEDICARE

## 2020-09-06 LAB
ALBUMIN SERPL-MCNC: 3.3 G/DL (ref 3.5–5.2)
ALP BLD-CCNC: 47 U/L (ref 40–129)
ALT SERPL-CCNC: 11 U/L (ref 0–40)
ANION GAP SERPL CALCULATED.3IONS-SCNC: 4 MMOL/L (ref 7–16)
ANISOCYTOSIS: ABNORMAL
AST SERPL-CCNC: 15 U/L (ref 0–39)
BASOPHILIC STIPPLING: ABNORMAL
BASOPHILS ABSOLUTE: 0.01 E9/L (ref 0–0.2)
BASOPHILS RELATIVE PERCENT: 0.3 % (ref 0–2)
BILIRUB SERPL-MCNC: 0.3 MG/DL (ref 0–1.2)
BUN BLDV-MCNC: 43 MG/DL (ref 8–23)
CALCIUM SERPL-MCNC: 8.8 MG/DL (ref 8.6–10.2)
CHLORIDE BLD-SCNC: 102 MMOL/L (ref 98–107)
CO2: 28 MMOL/L (ref 22–29)
CREAT SERPL-MCNC: 4 MG/DL (ref 0.7–1.2)
EOSINOPHILS ABSOLUTE: 0.11 E9/L (ref 0.05–0.5)
EOSINOPHILS RELATIVE PERCENT: 3.3 % (ref 0–6)
GFR AFRICAN AMERICAN: 19
GFR NON-AFRICAN AMERICAN: 15 ML/MIN/1.73
GLUCOSE BLD-MCNC: 61 MG/DL (ref 74–99)
HAV IGM SER IA-ACNC: NORMAL
HCT VFR BLD CALC: 27.7 % (ref 37–54)
HEMOGLOBIN: 8.5 G/DL (ref 12.5–16.5)
HEPATITIS B CORE IGM ANTIBODY: NORMAL
HEPATITIS B SURFACE ANTIGEN INTERPRETATION: NORMAL
HEPATITIS C ANTIBODY INTERPRETATION: NORMAL
IMMATURE GRANULOCYTES #: 0.02 E9/L
IMMATURE GRANULOCYTES %: 0.6 % (ref 0–5)
LYMPHOCYTES ABSOLUTE: 0.42 E9/L (ref 1.5–4)
LYMPHOCYTES RELATIVE PERCENT: 12.6 % (ref 20–42)
MAGNESIUM: 2.3 MG/DL (ref 1.6–2.6)
MCH RBC QN AUTO: 30.9 PG (ref 26–35)
MCHC RBC AUTO-ENTMCNC: 30.7 % (ref 32–34.5)
MCV RBC AUTO: 100.7 FL (ref 80–99.9)
METER GLUCOSE: 107 MG/DL (ref 74–99)
METER GLUCOSE: 115 MG/DL (ref 74–99)
METER GLUCOSE: 174 MG/DL (ref 74–99)
METER GLUCOSE: 58 MG/DL (ref 74–99)
METER GLUCOSE: 59 MG/DL (ref 74–99)
METER GLUCOSE: 60 MG/DL (ref 74–99)
METER GLUCOSE: 78 MG/DL (ref 74–99)
METER GLUCOSE: 84 MG/DL (ref 74–99)
METER GLUCOSE: 91 MG/DL (ref 74–99)
MONOCYTES ABSOLUTE: 0.27 E9/L (ref 0.1–0.95)
MONOCYTES RELATIVE PERCENT: 8.1 % (ref 2–12)
NEUTROPHILS ABSOLUTE: 2.5 E9/L (ref 1.8–7.3)
NEUTROPHILS RELATIVE PERCENT: 75.1 % (ref 43–80)
PDW BLD-RTO: 14.9 FL (ref 11.5–15)
PLATELET # BLD: 105 E9/L (ref 130–450)
PMV BLD AUTO: 9.8 FL (ref 7–12)
POLYCHROMASIA: ABNORMAL
POTASSIUM SERPL-SCNC: 4.7 MMOL/L (ref 3.5–5)
RBC # BLD: 2.75 E12/L (ref 3.8–5.8)
SODIUM BLD-SCNC: 134 MMOL/L (ref 132–146)
TOTAL PROTEIN: 6.5 G/DL (ref 6.4–8.3)
WBC # BLD: 3.3 E9/L (ref 4.5–11.5)

## 2020-09-06 PROCEDURE — 85025 COMPLETE CBC W/AUTO DIFF WBC: CPT

## 2020-09-06 PROCEDURE — 83735 ASSAY OF MAGNESIUM: CPT

## 2020-09-06 PROCEDURE — 1200000000 HC SEMI PRIVATE

## 2020-09-06 PROCEDURE — 6370000000 HC RX 637 (ALT 250 FOR IP): Performed by: SURGERY

## 2020-09-06 PROCEDURE — 76700 US EXAM ABDOM COMPLETE: CPT

## 2020-09-06 PROCEDURE — 36415 COLL VENOUS BLD VENIPUNCTURE: CPT

## 2020-09-06 PROCEDURE — 2500000003 HC RX 250 WO HCPCS: Performed by: INTERNAL MEDICINE

## 2020-09-06 PROCEDURE — 2700000000 HC OXYGEN THERAPY PER DAY

## 2020-09-06 PROCEDURE — 80053 COMPREHEN METABOLIC PANEL: CPT

## 2020-09-06 PROCEDURE — 2580000003 HC RX 258: Performed by: SURGERY

## 2020-09-06 PROCEDURE — 82962 GLUCOSE BLOOD TEST: CPT

## 2020-09-06 RX ORDER — BUMETANIDE 0.25 MG/ML
2 INJECTION, SOLUTION INTRAMUSCULAR; INTRAVENOUS 2 TIMES DAILY
Status: COMPLETED | OUTPATIENT
Start: 2020-09-06 | End: 2020-09-06

## 2020-09-06 RX ADMIN — BUMETANIDE 2 MG: 0.25 INJECTION INTRAMUSCULAR; INTRAVENOUS at 21:45

## 2020-09-06 RX ADMIN — DEXTROSE MONOHYDRATE 12.5 G: 25 INJECTION, SOLUTION INTRAVENOUS at 12:38

## 2020-09-06 RX ADMIN — SODIUM CHLORIDE, PRESERVATIVE FREE 10 ML: 5 INJECTION INTRAVENOUS at 21:45

## 2020-09-06 RX ADMIN — CLONIDINE HYDROCHLORIDE 0.1 MG: 0.1 TABLET ORAL at 15:43

## 2020-09-06 RX ADMIN — SODIUM CHLORIDE, PRESERVATIVE FREE 10 ML: 5 INJECTION INTRAVENOUS at 10:16

## 2020-09-06 RX ADMIN — CLONIDINE HYDROCHLORIDE 0.1 MG: 0.1 TABLET ORAL at 21:44

## 2020-09-06 RX ADMIN — TRIAMCINOLONE ACETONIDE: 1 CREAM TOPICAL at 21:45

## 2020-09-06 RX ADMIN — CHOLECALCIFEROL TAB 50 MCG (2000 UNIT) 4000 UNITS: 50 TAB at 15:44

## 2020-09-06 RX ADMIN — ARIPIPRAZOLE 2.5 MG: 5 TABLET ORAL at 15:44

## 2020-09-06 RX ADMIN — OYSTER SHELL CALCIUM WITH VITAMIN D 1 TABLET: 500; 200 TABLET, FILM COATED ORAL at 21:45

## 2020-09-06 RX ADMIN — TRIAMCINOLONE ACETONIDE: 1 CREAM TOPICAL at 10:16

## 2020-09-06 RX ADMIN — ACETAMINOPHEN 650 MG: 325 TABLET ORAL at 15:43

## 2020-09-06 RX ADMIN — DEXTROSE MONOHYDRATE 12.5 G: 25 INJECTION, SOLUTION INTRAVENOUS at 07:34

## 2020-09-06 RX ADMIN — DEXTROSE MONOHYDRATE 100 ML/HR: 50 INJECTION, SOLUTION INTRAVENOUS at 07:31

## 2020-09-06 RX ADMIN — DOXEPIN HYDROCHLORIDE 25 MG: 25 CAPSULE ORAL at 21:44

## 2020-09-06 RX ADMIN — CITALOPRAM HYDROBROMIDE 20 MG: 20 TABLET ORAL at 15:45

## 2020-09-06 RX ADMIN — CEFUROXIME AXETIL 500 MG: 500 TABLET ORAL at 21:44

## 2020-09-06 RX ADMIN — BUMETANIDE 2 MG: 0.25 INJECTION INTRAMUSCULAR; INTRAVENOUS at 15:46

## 2020-09-06 RX ADMIN — DILTIAZEM HYDROCHLORIDE 240 MG: 240 CAPSULE, COATED, EXTENDED RELEASE ORAL at 15:45

## 2020-09-06 RX ADMIN — FENOFIBRATE 54 MG: 54 TABLET ORAL at 21:44

## 2020-09-06 ASSESSMENT — PAIN SCALES - GENERAL
PAINLEVEL_OUTOF10: 3
PAINLEVEL_OUTOF10: 0
PAINLEVEL_OUTOF10: 0

## 2020-09-06 ASSESSMENT — PAIN DESCRIPTION - ONSET: ONSET: SUDDEN

## 2020-09-06 ASSESSMENT — PAIN DESCRIPTION - PROGRESSION: CLINICAL_PROGRESSION: GRADUALLY WORSENING

## 2020-09-06 ASSESSMENT — PAIN DESCRIPTION - PAIN TYPE: TYPE: ACUTE PAIN

## 2020-09-06 ASSESSMENT — PAIN DESCRIPTION - FREQUENCY: FREQUENCY: INTERMITTENT

## 2020-09-06 ASSESSMENT — PAIN DESCRIPTION - DESCRIPTORS: DESCRIPTORS: HEADACHE

## 2020-09-06 ASSESSMENT — PAIN DESCRIPTION - LOCATION: LOCATION: HEAD

## 2020-09-06 ASSESSMENT — PAIN - FUNCTIONAL ASSESSMENT: PAIN_FUNCTIONAL_ASSESSMENT: ACTIVITIES ARE NOT PREVENTED

## 2020-09-06 NOTE — PROGRESS NOTES
[facility] MD Samra Verdin MD Ignacia Stabler, MD Nanda Panchal, AMY Raymundo, RUTH  Consultation  Patient's Name: José Luis Nguyen  11:10 AM  9/6/2020    Sub : in NAD     History of Present Ilness:    Mr. Modesto Sanchez is a pleasant 78-year-old gentleman who is a resident at assisted living and has a past medical history notable for diabetes, dyslipidemia, hypertension, smoking, COPD and history of alcohol abuse. Denies known renal disease. Routine laboratory studies revealed pancytopenia including WBC 2.9, hemoglobin 6.3, platelets 725, as well as elevated BUN/creatinine 57 and 4.3, respectively. Last known BUN/creatinine were 29 and 2.6, respectively, in April of this year. He is a rather vague historian, does not know medications he takes a much about his underlying medical diseases. He is received 2 units PRBCs. He underwent EGD today revealing duodenitis, gastritis, but no site of acute bleed. He is for colonoscopy in the near future. He denies NSAIDs. No recent course of antimicrobials known to him. Denies lightheadedness, chest pain or palpitation, shortness of breath such as exertion, cough wheeze or sputum production. No recent fever or chill. Does have fatigue generalized weakness. No recent diarrhea, constipation, melena hematochezia though he does note some dark stools recently. No dysuria or hematuria, no urinary hesitancy or other changes urinary habits. No flank pain. Denies mental or visual status changes.     Past Medical History:   Diagnosis Date    CHF (congestive heart failure) (HCC)     COPD (chronic obstructive pulmonary disease) (HCC)     Diabetes mellitus ([facility] [address]     Hyperlipidemia     Hypertension        Past Surgical History:   Procedure Laterality Date    UPPER GASTROINTESTINAL ENDOSCOPY N/A 9/4/2020    EGD ESOPHAGOGASTRODUODENOSCOPY performed by Fidencio Redd MD at [address] [place] [address] History   Problem Relation Age of Onset    Breast Cancer Mother     High Blood Pressure Father         reports that he has been smoking cigarettes. He started smoking about 54 years ago. He has never used smokeless tobacco. He reports previous alcohol use. He reports that he does not use drugs. Allergies:  Patient has no known allergies. Current Medications:    pantoprazole (PROTONIX) tablet 40 mg, QAM AC  insulin lispro (HUMALOG) injection vial 0-18 Units, TID WC  insulin lispro (HUMALOG) injection vial 0-9 Units, Nightly  sodium chloride flush 0.9 % injection 10 mL, 2 times per day  sodium chloride flush 0.9 % injection 10 mL, PRN  acetaminophen (TYLENOL) tablet 650 mg, Q6H PRN    Or  acetaminophen (TYLENOL) suppository 650 mg, Q6H PRN  polyethylene glycol (GLYCOLAX) packet 17 g, Daily PRN  promethazine (PHENERGAN) tablet 12.5 mg, Q6H PRN    Or  ondansetron (ZOFRAN) injection 4 mg, Q6H PRN  ARIPiprazole (ABILIFY) tablet 2.5 mg, Daily  calcium-vitamin D 500-200 MG-UNIT per tablet 1 tablet, BID  vitamin D (CHOLECALCIFEROL) tablet 4,000 Units, Daily  cefUROXime (CEFTIN) tablet 500 mg, BID  citalopram (CELEXA) tablet 20 mg, Daily  cloNIDine (CATAPRES) tablet 0.1 mg, TID  dilTIAZem (CARDIZEM CD) extended release capsule 240 mg, Daily  doxepin (SINEQUAN) capsule 25 mg, Nightly  fenofibrate (TRICOR) tablet 54 mg, Nightly  insulin glargine (LANTUS) injection vial 69 Units, QAM  triamcinolone (KENALOG) 0.1 % cream, BID  glucose (GLUTOSE) 40 % oral gel 15 g, PRN  dextrose 50 % IV solution, PRN  glucagon (rDNA) injection 1 mg, PRN  dextrose 5 % solution, PRN  0.9 % sodium chloride bolus, Once        Review of Systems:   Pertinent items are noted in HPI.     Physical exam:   BP (!) 184/82   Pulse 74   Temp 97.6 °F (36.4 °C) (Oral)   Resp 18   Ht 5' 10.5\" (1.791 m)   Wt 233 lb (105.7 kg)   SpO2 99%   BMI 32.96 kg/m²   Obese white gentleman who appears older than his stated age, in no apparent distress  NC/AT EOMI sclera and conjunctiva are clear and anicteric mucous membranes are moist  Neck soft supple trachea midline no carotid bruit no JVD  CTA bilateral  Regular rhythm normal S1 and S2, no murmur, no rub  Abdomen soft nontender nondistended normal active bowel sounds  Distal extremities reveal skin change consistent with chronic venous stasis, mild chronic-type edema, though no pitting edema  Pulses 1+x4  Moves all 4 extremities  Cranial nerves II through XII grossly intact  Awake alert appropriate, interactive  Besides skin changes in distal lower extremities as noted above, no rash or lesion on visible portions of integument    Data:   Labs:  CBC with Differential:    Lab Results   Component Value Date    WBC 3.3 09/06/2020    RBC 2.75 09/06/2020    HGB 8.5 09/06/2020    HCT 27.7 09/06/2020     09/06/2020    .7 09/06/2020    MCH 30.9 09/06/2020    MCHC 30.7 09/06/2020    RDW 14.9 09/06/2020    LYMPHOPCT 12.6 09/06/2020    MONOPCT 8.1 09/06/2020    BASOPCT 0.3 09/06/2020    MONOSABS 0.27 09/06/2020    LYMPHSABS 0.42 09/06/2020    EOSABS 0.11 09/06/2020    BASOSABS 0.01 09/06/2020     CMP:    Lab Results   Component Value Date     09/06/2020    K 4.7 09/06/2020    K 4.4 09/03/2020     09/06/2020    CO2 28 09/06/2020    BUN 43 09/06/2020    CREATININE 4.0 09/06/2020    GFRAA 19 09/06/2020    LABGLOM 15 09/06/2020    GLUCOSE 61 09/06/2020    PROT 6.5 09/06/2020    LABALBU 3.3 09/06/2020    CALCIUM 8.8 09/06/2020    BILITOT 0.3 09/06/2020    ALKPHOS 47 09/06/2020    AST 15 09/06/2020    ALT 11 09/06/2020     Ionized Calcium:  No results found for: IONCA  Magnesium:    Lab Results   Component Value Date    MG 2.3 09/06/2020         Imaging:  US ABDOMEN LIMITED Specify organ? LIVER, SPLEEN    (Results Pending)   US RETROPERITONEAL LIMITED    (Results Pending)       Assessment  1.  Acute kidney injury, presumptively hemodynamically mediated due to severe anemia, the differential diagnosis at this point remains broad. 2. Chronic kidney disease. Last known creatinine was 2.6 mg/dL, which would be consistent with estimated GFR of 25 mL/min, and CKD stage IV, though at this time no other creatinine values are known. Presumptive etiology is diabetic nephropathy, and renal microvascular atherosclerotic disease/hypertensive nephrosclerosis. 3. Anemia, likely least part secondary to CKD, and GI bleed, though this would not explain the pancytopenia. Evaluation per hematology is underway. EGD demonstrates duodenitis and gastritis though no site of active bleed. Colonoscopy to follow. 4. Hypertension, likely essential.    Recommendations    Cr trending down with iv fluids . His baseline around 2.5 .    Will check US kidney   Will need repeat urine studies once the BENJY resolved       Electronically signed by Selvin Akers MD on 9/6/2020

## 2020-09-06 NOTE — PROGRESS NOTES
[facility] MD Susan Tello MD Newell Seton, MD Beatrice Scala, MD Cherylle Cheese, AMY Edgar, RUTH  Consultation  Patient's Name: Shelbie Lemus  12:31 PM  9/6/2020    Sub : in NAD . Edema noted     History of Present Ilness:    Mr. Radha Khalil is a pleasant 78-year-old gentleman who is a resident at assisted living and has a past medical history notable for diabetes, dyslipidemia, hypertension, smoking, COPD and history of alcohol abuse. Denies known renal disease. Routine laboratory studies revealed pancytopenia including WBC 2.9, hemoglobin 6.3, platelets 739, as well as elevated BUN/creatinine 57 and 4.3, respectively. Last known BUN/creatinine were 29 and 2.6, respectively, in April of this year. He is a rather vague historian, does not know medications he takes a much about his underlying medical diseases. He is received 2 units PRBCs. He underwent EGD today revealing duodenitis, gastritis, but no site of acute bleed. He is for colonoscopy in the near future. He denies NSAIDs. No recent course of antimicrobials known to him. Denies lightheadedness, chest pain or palpitation, shortness of breath such as exertion, cough wheeze or sputum production. No recent fever or chill. Does have fatigue generalized weakness. No recent diarrhea, constipation, melena hematochezia though he does note some dark stools recently. No dysuria or hematuria, no urinary hesitancy or other changes urinary habits. No flank pain. Denies mental or visual status changes.     Past Medical History:   Diagnosis Date    CHF (congestive heart failure) (HCC)     COPD (chronic obstructive pulmonary disease) (HCC)     Diabetes mellitus ([facility] [address]     Hyperlipidemia     Hypertension        Past Surgical History:   Procedure Laterality Date    UPPER GASTROINTESTINAL ENDOSCOPY N/A 9/4/2020    EGD ESOPHAGOGASTRODUODENOSCOPY performed by Easton Simeon MD at [facility] ENDOSCOPY       Family History   Problem Relation Age of Onset    Breast Cancer Mother     High Blood Pressure Father         reports that he has been smoking cigarettes. He started smoking about 54 years ago. He has never used smokeless tobacco. He reports previous alcohol use. He reports that he does not use drugs. Allergies:  Patient has no known allergies. Current Medications:    pantoprazole (PROTONIX) tablet 40 mg, QAM AC  insulin lispro (HUMALOG) injection vial 0-18 Units, TID WC  insulin lispro (HUMALOG) injection vial 0-9 Units, Nightly  sodium chloride flush 0.9 % injection 10 mL, 2 times per day  sodium chloride flush 0.9 % injection 10 mL, PRN  acetaminophen (TYLENOL) tablet 650 mg, Q6H PRN    Or  acetaminophen (TYLENOL) suppository 650 mg, Q6H PRN  polyethylene glycol (GLYCOLAX) packet 17 g, Daily PRN  promethazine (PHENERGAN) tablet 12.5 mg, Q6H PRN    Or  ondansetron (ZOFRAN) injection 4 mg, Q6H PRN  ARIPiprazole (ABILIFY) tablet 2.5 mg, Daily  calcium-vitamin D 500-200 MG-UNIT per tablet 1 tablet, BID  vitamin D (CHOLECALCIFEROL) tablet 4,000 Units, Daily  cefUROXime (CEFTIN) tablet 500 mg, BID  citalopram (CELEXA) tablet 20 mg, Daily  cloNIDine (CATAPRES) tablet 0.1 mg, TID  dilTIAZem (CARDIZEM CD) extended release capsule 240 mg, Daily  doxepin (SINEQUAN) capsule 25 mg, Nightly  fenofibrate (TRICOR) tablet 54 mg, Nightly  insulin glargine (LANTUS) injection vial 69 Units, QAM  triamcinolone (KENALOG) 0.1 % cream, BID  glucose (GLUTOSE) 40 % oral gel 15 g, PRN  dextrose 50 % IV solution, PRN  glucagon (rDNA) injection 1 mg, PRN  dextrose 5 % solution, PRN  0.9 % sodium chloride bolus, Once        Review of Systems:   Pertinent items are noted in HPI.     Physical exam:   BP (!) 184/82   Pulse 74   Temp 97.6 °F (36.4 °C) (Oral)   Resp 18   Ht 5' 10.5\" (1.791 m)   Wt 233 lb (105.7 kg)   SpO2 99%   BMI 32.96 kg/m²   Obese white gentleman who appears older than his stated age, in no apparent distress  NC/AT EOMI sclera and conjunctiva are clear and anicteric mucous membranes are moist  Neck soft supple trachea midline no carotid bruit no JVD  CTA bilateral  Regular rhythm normal S1 and S2, no murmur, no rub  Abdomen soft nontender nondistended normal active bowel sounds  Distal extremities reveal skin change consistent with chronic venous stasis, mild chronic-type edema, though no pitting edema  Pulses 1+x4  Moves all 4 extremities  Cranial nerves II through XII grossly intact  Awake alert appropriate, interactive  Besides skin changes in distal lower extremities as noted above, no rash or lesion on visible portions of integument    Data:   Labs:  CBC with Differential:    Lab Results   Component Value Date    WBC 3.3 09/06/2020    RBC 2.75 09/06/2020    HGB 8.5 09/06/2020    HCT 27.7 09/06/2020     09/06/2020    .7 09/06/2020    MCH 30.9 09/06/2020    MCHC 30.7 09/06/2020    RDW 14.9 09/06/2020    LYMPHOPCT 12.6 09/06/2020    MONOPCT 8.1 09/06/2020    BASOPCT 0.3 09/06/2020    MONOSABS 0.27 09/06/2020    LYMPHSABS 0.42 09/06/2020    EOSABS 0.11 09/06/2020    BASOSABS 0.01 09/06/2020     CMP:    Lab Results   Component Value Date     09/06/2020    K 4.7 09/06/2020    K 4.4 09/03/2020     09/06/2020    CO2 28 09/06/2020    BUN 43 09/06/2020    CREATININE 4.0 09/06/2020    GFRAA 19 09/06/2020    LABGLOM 15 09/06/2020    GLUCOSE 61 09/06/2020    PROT 6.5 09/06/2020    LABALBU 3.3 09/06/2020    CALCIUM 8.8 09/06/2020    BILITOT 0.3 09/06/2020    ALKPHOS 47 09/06/2020    AST 15 09/06/2020    ALT 11 09/06/2020     Ionized Calcium:  No results found for: IONCA  Magnesium:    Lab Results   Component Value Date    MG 2.3 09/06/2020         Imaging:  US ABDOMEN LIMITED Specify organ? LIVER, SPLEEN    (Results Pending)   US RETROPERITONEAL LIMITED    (Results Pending)       Assessment  1.  Acute kidney injury, presumptively hemodynamically mediated due to severe anemia, the differential diagnosis at this point remains broad. 2. Chronic kidney disease. Last known creatinine was 2.6 mg/dL, which would be consistent with estimated GFR of 25 mL/min, and CKD stage IV, though at this time no other creatinine values are known. Presumptive etiology is diabetic nephropathy, and renal microvascular atherosclerotic disease/hypertensive nephrosclerosis. 3. Anemia, likely least part secondary to CKD, and GI bleed, though this would not explain the pancytopenia. Evaluation per hematology is underway. EGD demonstrates duodenitis and gastritis though no site of active bleed. Colonoscopy to follow. 4. Hypertension, likely essential.    Recommendations      Cr at 4 . He might not be that far from the need for renal replacement therapy   He has 2+ edema in LEs   Will challenge with bumex 2 mg iv for 2 doses and see how he responds .        Electronically signed by Jayro Clark MD on 9/6/2020

## 2020-09-06 NOTE — PROGRESS NOTES
Subjective: The patient is awake and alert. Sitting up on edge of bed. Feels much better than presentation. Status post EGD (9/4/2020). No problems overnight. Denies chest pain, angina, and dyspnea. Denies abdominal pain. Tolerating diet. No nausea or vomiting. Wants to be discharged. Objective:    BP (!) 168/75   Pulse 81   Temp 97.3 °F (36.3 °C) (Oral)   Resp 20   Ht 5' 10.5\" (1.791 m)   Wt 233 lb (105.7 kg)   SpO2 100%   BMI 32.96 kg/m²     Current medications that patient is taking have been reviewed. Heart:  RRR, no murmurs, gallops, or rubs.   Lungs:  CTA bilaterally, no wheeze, rales or rhonchi  Abd: bowel sounds present, soft, nontender, nondistended, no masses  Extrem:  No cyanosis or edema    CBC with Differential:    Lab Results   Component Value Date    WBC 3.9 09/05/2020    RBC 2.37 09/05/2020    HGB 9.2 09/05/2020    HCT 30.1 09/05/2020     09/05/2020    .1 09/05/2020    MCH 31.2 09/05/2020    MCHC 30.6 09/05/2020    RDW 14.5 09/05/2020    LYMPHOPCT 8.8 09/05/2020    MONOPCT 7.8 09/05/2020    BASOPCT 0.3 09/05/2020    MONOSABS 0.30 09/05/2020    LYMPHSABS 0.34 09/05/2020    EOSABS 0.10 09/05/2020    BASOSABS 0.01 09/05/2020     CMP:    Lab Results   Component Value Date     09/05/2020    K 4.8 09/05/2020    K 4.4 09/03/2020     09/05/2020    CO2 25 09/05/2020    BUN 45 09/05/2020    CREATININE 3.9 09/05/2020    GFRAA 19 09/05/2020    LABGLOM 16 09/05/2020    GLUCOSE 205 09/05/2020    PROT 6.3 09/05/2020    LABALBU 3.2 09/05/2020    CALCIUM 8.2 09/05/2020    BILITOT 0.3 09/05/2020    ALKPHOS 44 09/05/2020    AST 14 09/05/2020    ALT 11 09/05/2020     BMP:    Lab Results   Component Value Date     09/05/2020    K 4.8 09/05/2020    K 4.4 09/03/2020     09/05/2020    CO2 25 09/05/2020    BUN 45 09/05/2020    LABALBU 3.2 09/05/2020    CREATININE 3.9 09/05/2020    CALCIUM 8.2 09/05/2020    GFRAA 19 09/05/2020    LABGLOM 16 09/05/2020    GLUCOSE 205 09/05/2020     Magnesium:    Lab Results   Component Value Date    MG 2.3 09/05/2020     Phosphorus:  No results found for: PHOS  PT/INR:  No results found for: PROTIME, INR  PTT:  No results found for: APTT, PTT[APTT}     Assessment:    Patient Active Problem List   Diagnosis    GI bleed    Acute blood loss anemia    Essential hypertension    [facility]) [facility]    Tobacco abuse    Depression    Psychiatric disorder    Diabetes mellitus type 2, uncontrolled ([facility])    Hyperlipidemia LDL goal <100    CKD (chronic kidney disease) stage 4, GFR 15-29 ml/min ([facility])    Acute kidney injury ([facility] [address]       Plan:    Status post EGD. No active bleeding seen. General surgery on board. Transfused. Hgb better. Hem/onc on board. Blood pressure ok, continue current medications  Continue breathing treatments and supplemental oxygen. Discussed cessation. Continue psychiatric medications. As above. Blood glucose ok, continue to adjust basal/bolus insulin therapy  Continue aggressive lipid therapy  Renal function improving. Nephrology on board. As above. Pt/Ot evaluations for discharge planning. Discharge soon.     Anali Villalobos    5:09 AM  9/6/2020

## 2020-09-07 ENCOUNTER — APPOINTMENT (OUTPATIENT)
Dept: GENERAL RADIOLOGY | Age: 60
DRG: 378 | End: 2020-09-07
Payer: MEDICARE

## 2020-09-07 LAB
ALBUMIN SERPL-MCNC: 3.3 G/DL (ref 3.5–5.2)
ALP BLD-CCNC: 49 U/L (ref 40–129)
ALT SERPL-CCNC: 11 U/L (ref 0–40)
ANION GAP SERPL CALCULATED.3IONS-SCNC: 6 MMOL/L (ref 7–16)
AST SERPL-CCNC: 16 U/L (ref 0–39)
BASOPHILIC STIPPLING: ABNORMAL
BASOPHILS ABSOLUTE: 0.02 E9/L (ref 0–0.2)
BASOPHILS RELATIVE PERCENT: 0.6 % (ref 0–2)
BILIRUB SERPL-MCNC: 0.3 MG/DL (ref 0–1.2)
BUN BLDV-MCNC: 39 MG/DL (ref 8–23)
CALCIUM SERPL-MCNC: 9 MG/DL (ref 8.6–10.2)
CHLORIDE BLD-SCNC: 101 MMOL/L (ref 98–107)
CO2: 26 MMOL/L (ref 22–29)
CREAT SERPL-MCNC: 4.1 MG/DL (ref 0.7–1.2)
EOSINOPHILS ABSOLUTE: 0.12 E9/L (ref 0.05–0.5)
EOSINOPHILS RELATIVE PERCENT: 3.4 % (ref 0–6)
GFR AFRICAN AMERICAN: 18
GFR NON-AFRICAN AMERICAN: 15 ML/MIN/1.73
GLUCOSE BLD-MCNC: 86 MG/DL (ref 74–99)
HCT VFR BLD CALC: 28.4 % (ref 37–54)
HEMOGLOBIN: 8.7 G/DL (ref 12.5–16.5)
IMMATURE GRANULOCYTES #: 0.01 E9/L
IMMATURE GRANULOCYTES %: 0.3 % (ref 0–5)
LYMPHOCYTES ABSOLUTE: 0.5 E9/L (ref 1.5–4)
LYMPHOCYTES RELATIVE PERCENT: 14.4 % (ref 20–42)
MAGNESIUM: 2.2 MG/DL (ref 1.6–2.6)
MCH RBC QN AUTO: 30.6 PG (ref 26–35)
MCHC RBC AUTO-ENTMCNC: 30.6 % (ref 32–34.5)
MCV RBC AUTO: 100 FL (ref 80–99.9)
METER GLUCOSE: 165 MG/DL (ref 74–99)
METER GLUCOSE: 186 MG/DL (ref 74–99)
METER GLUCOSE: 207 MG/DL (ref 74–99)
METER GLUCOSE: 78 MG/DL (ref 74–99)
MONOCYTES ABSOLUTE: 0.37 E9/L (ref 0.1–0.95)
MONOCYTES RELATIVE PERCENT: 10.6 % (ref 2–12)
NEUTROPHILS ABSOLUTE: 2.46 E9/L (ref 1.8–7.3)
NEUTROPHILS RELATIVE PERCENT: 70.7 % (ref 43–80)
OVALOCYTES: ABNORMAL
PDW BLD-RTO: 14.6 FL (ref 11.5–15)
PLATELET # BLD: 117 E9/L (ref 130–450)
PMV BLD AUTO: 9.7 FL (ref 7–12)
POIKILOCYTES: ABNORMAL
POLYCHROMASIA: ABNORMAL
POTASSIUM SERPL-SCNC: 4.5 MMOL/L (ref 3.5–5)
RBC # BLD: 2.84 E12/L (ref 3.8–5.8)
SODIUM BLD-SCNC: 133 MMOL/L (ref 132–146)
TEAR DROP CELLS: ABNORMAL
TOTAL PROTEIN: 6.6 G/DL (ref 6.4–8.3)
WBC # BLD: 3.5 E9/L (ref 4.5–11.5)

## 2020-09-07 PROCEDURE — 1200000000 HC SEMI PRIVATE

## 2020-09-07 PROCEDURE — 6370000000 HC RX 637 (ALT 250 FOR IP): Performed by: SURGERY

## 2020-09-07 PROCEDURE — 82962 GLUCOSE BLOOD TEST: CPT

## 2020-09-07 PROCEDURE — 80053 COMPREHEN METABOLIC PANEL: CPT

## 2020-09-07 PROCEDURE — 2700000000 HC OXYGEN THERAPY PER DAY

## 2020-09-07 PROCEDURE — 83735 ASSAY OF MAGNESIUM: CPT

## 2020-09-07 PROCEDURE — 36415 COLL VENOUS BLD VENIPUNCTURE: CPT

## 2020-09-07 PROCEDURE — 2580000003 HC RX 258: Performed by: INTERNAL MEDICINE

## 2020-09-07 PROCEDURE — 2580000003 HC RX 258: Performed by: SURGERY

## 2020-09-07 PROCEDURE — 6370000000 HC RX 637 (ALT 250 FOR IP): Performed by: INTERNAL MEDICINE

## 2020-09-07 PROCEDURE — 2500000003 HC RX 250 WO HCPCS: Performed by: INTERNAL MEDICINE

## 2020-09-07 PROCEDURE — 71045 X-RAY EXAM CHEST 1 VIEW: CPT

## 2020-09-07 PROCEDURE — 85025 COMPLETE CBC W/AUTO DIFF WBC: CPT

## 2020-09-07 RX ADMIN — DILTIAZEM HYDROCHLORIDE 240 MG: 240 CAPSULE, COATED, EXTENDED RELEASE ORAL at 09:35

## 2020-09-07 RX ADMIN — CLONIDINE HYDROCHLORIDE 0.1 MG: 0.1 TABLET ORAL at 09:35

## 2020-09-07 RX ADMIN — INSULIN LISPRO 3 UNITS: 100 INJECTION, SOLUTION INTRAVENOUS; SUBCUTANEOUS at 11:43

## 2020-09-07 RX ADMIN — TRIAMCINOLONE ACETONIDE: 1 CREAM TOPICAL at 20:05

## 2020-09-07 RX ADMIN — ARIPIPRAZOLE 2.5 MG: 5 TABLET ORAL at 09:35

## 2020-09-07 RX ADMIN — FENOFIBRATE 54 MG: 54 TABLET ORAL at 20:04

## 2020-09-07 RX ADMIN — SODIUM CHLORIDE, PRESERVATIVE FREE 10 ML: 5 INJECTION INTRAVENOUS at 09:35

## 2020-09-07 RX ADMIN — PANTOPRAZOLE SODIUM 40 MG: 40 TABLET, DELAYED RELEASE ORAL at 06:13

## 2020-09-07 RX ADMIN — INSULIN LISPRO 2 UNITS: 100 INJECTION, SOLUTION INTRAVENOUS; SUBCUTANEOUS at 20:04

## 2020-09-07 RX ADMIN — CITALOPRAM HYDROBROMIDE 20 MG: 20 TABLET ORAL at 09:35

## 2020-09-07 RX ADMIN — CLONIDINE HYDROCHLORIDE 0.1 MG: 0.1 TABLET ORAL at 14:57

## 2020-09-07 RX ADMIN — BUMETANIDE 0.5 MG/HR: 0.25 INJECTION INTRAMUSCULAR; INTRAVENOUS at 11:51

## 2020-09-07 RX ADMIN — SODIUM CHLORIDE, PRESERVATIVE FREE 10 ML: 5 INJECTION INTRAVENOUS at 20:05

## 2020-09-07 RX ADMIN — TRIAMCINOLONE ACETONIDE: 1 CREAM TOPICAL at 09:35

## 2020-09-07 RX ADMIN — CHOLECALCIFEROL TAB 50 MCG (2000 UNIT) 4000 UNITS: 50 TAB at 09:34

## 2020-09-07 RX ADMIN — OYSTER SHELL CALCIUM WITH VITAMIN D 1 TABLET: 500; 200 TABLET, FILM COATED ORAL at 09:35

## 2020-09-07 RX ADMIN — CEFUROXIME AXETIL 500 MG: 500 TABLET ORAL at 09:35

## 2020-09-07 RX ADMIN — DOXEPIN HYDROCHLORIDE 25 MG: 25 CAPSULE ORAL at 20:08

## 2020-09-07 RX ADMIN — INSULIN LISPRO 6 UNITS: 100 INJECTION, SOLUTION INTRAVENOUS; SUBCUTANEOUS at 16:41

## 2020-09-07 RX ADMIN — CEFUROXIME AXETIL 500 MG: 500 TABLET ORAL at 20:04

## 2020-09-07 RX ADMIN — CLONIDINE HYDROCHLORIDE 0.1 MG: 0.1 TABLET ORAL at 20:04

## 2020-09-07 RX ADMIN — OYSTER SHELL CALCIUM WITH VITAMIN D 1 TABLET: 500; 200 TABLET, FILM COATED ORAL at 20:04

## 2020-09-07 ASSESSMENT — PAIN SCALES - GENERAL
PAINLEVEL_OUTOF10: 0

## 2020-09-07 NOTE — PROGRESS NOTES
Progress Note    Subjective:  Patient states he is starting to feel better. He does not feel as weak as he did when he came in. He denies any nausea. He has been urinating frequently. Decreased swelling in his legs. Objective:    BP (!) 155/72   Pulse 72   Temp 98.3 °F (36.8 °C) (Oral)   Resp 16   Ht 5' 10.5\" (1.791 m)   Wt 279 lb 4 oz (126.7 kg)   SpO2 95%   BMI 39.50 kg/m²     General: Pleasant gentleman awake alert oriented x3  HEENT: PERRLA, anicteric sclera, oropharynx is clear. Heart:  RRR, no murmurs, gallops, or rubs. Lungs:  CTA bilaterally, no wheeze, rales or rhonchi  Abd: bowel sounds present, nontender, nondistended, no masses  Extrem: Bilateral leg edema with scaly skin. No redness or warmth. CBC:   Lab Results   Component Value Date    WBC 3.5 09/07/2020    RBC 2.84 09/07/2020    HGB 8.7 09/07/2020    HCT 28.4 09/07/2020    .0 09/07/2020    MCH 30.6 09/07/2020    MCHC 30.6 09/07/2020    RDW 14.6 09/07/2020     09/07/2020    MPV 9.7 09/07/2020     CMP:    Lab Results   Component Value Date     09/07/2020    K 4.5 09/07/2020    K 4.4 09/03/2020     09/07/2020    CO2 26 09/07/2020    BUN 39 09/07/2020    CREATININE 4.1 09/07/2020    GFRAA 18 09/07/2020    LABGLOM 15 09/07/2020    GLUCOSE 86 09/07/2020    PROT 6.6 09/07/2020    LABALBU 3.3 09/07/2020    CALCIUM 9.0 09/07/2020    BILITOT 0.3 09/07/2020    ALKPHOS 49 09/07/2020    AST 16 09/07/2020    ALT 11 09/07/2020            XR CHEST PORTABLE   Final Result   CHF. Right pleural effusion with adjacent atelectasis and/or   infiltrate. Tiny left pleural effusion. US ABDOMEN COMPLETE   Final Result   1. Hepatosplenomegaly. 2. Findings suggestive of chronic renal disease. 3. Right pleural effusion.             US RETROPERITONEAL LIMITED    (Results Pending)         Current Facility-Administered Medications:     bumetanide (BUMEX) 12.5 mg in sodium chloride 0.9 % 125 mL infusion, 0.5 mg/hr, Intravenous, Continuous, Waqar Avalos MD, Last Rate: 5 mL/hr at 09/07/20 1151, 0.5 mg/hr at 09/07/20 1151    pantoprazole (PROTONIX) tablet 40 mg, 40 mg, Oral, QAM AC, Yordan Remy MD, 40 mg at 09/07/20 0736    insulin lispro (HUMALOG) injection vial 0-18 Units, 0-18 Units, Subcutaneous, TID WC, Yordan Remy MD, 3 Units at 09/07/20 1143    insulin lispro (HUMALOG) injection vial 0-9 Units, 0-9 Units, Subcutaneous, Nightly, Yordan Remy MD, 2 Units at 09/05/20 2046    sodium chloride flush 0.9 % injection 10 mL, 10 mL, Intravenous, 2 times per day, Desmond DOSHI MD, 10 mL at 09/07/20 0935    sodium chloride flush 0.9 % injection 10 mL, 10 mL, Intravenous, PRN, Flores Cisneros MD    acetaminophen (TYLENOL) tablet 650 mg, 650 mg, Oral, Q6H PRN, 650 mg at 09/06/20 1543 **OR** acetaminophen (TYLENOL) suppository 650 mg, 650 mg, Rectal, Q6H PRN, Flores Cisneros MD    polyethylene glycol (GLYCOLAX) packet 17 g, 17 g, Oral, Daily PRN, Flores Cisneros MD    promethazine (PHENERGAN) tablet 12.5 mg, 12.5 mg, Oral, Q6H PRN **OR** ondansetron (ZOFRAN) injection 4 mg, 4 mg, Intravenous, Q6H PRN, Flores Cisneros MD    ARIPiprazole (ABILIFY) tablet 2.5 mg, 2.5 mg, Oral, Daily, Flores Cisneros MD, 2.5 mg at 09/07/20 0935    calcium-vitamin D 500-200 MG-UNIT per tablet 1 tablet, 1 tablet, Oral, BID, Desmond DOSHI MD, 1 tablet at 09/07/20 0935    vitamin D (CHOLECALCIFEROL) tablet 4,000 Units, 4,000 Units, Oral, Daily, Flores Cisneros MD, 4,000 Units at 09/07/20 0934    cefUROXime (CEFTIN) tablet 500 mg, 500 mg, Oral, BID, Desmond DOSHI MD, 500 mg at 09/07/20 0935    citalopram (CELEXA) tablet 20 mg, 20 mg, Oral, Daily, Desmond DOSHI MD, 20 mg at 09/07/20 0935    cloNIDine (CATAPRES) tablet 0.1 mg, 0.1 mg, Oral, TID, Desmond DOSHI MD, 0.1 mg at 09/07/20 0935    dilTIAZem (CARDIZEM CD) extended release capsule 240 mg, 240 mg, Oral, Daily, Desmond DOSHI MD, 240 mg at 09/07/20 0935    doxepin (SINEQUAN) capsule 25 mg, 25 mg, Oral, Nightly, Gabriel DOSHI MD, 25 mg at 09/06/20 2144    fenofibrate (TRICOR) tablet 54 mg, 54 mg, Oral, Nightly, Gabriel DOSHI MD, 54 mg at 09/06/20 2144    insulin glargine (LANTUS) injection vial 69 Units, 69 Units, Subcutaneous, QAM, Parul Tim MD, 69 Units at 09/05/20 0945    triamcinolone (KENALOG) 0.1 % cream, , Topical, BID, Parul Tim MD    glucose (GLUTOSE) 40 % oral gel 15 g, 15 g, Oral, PRN, Parul Tim MD    dextrose 50 % IV solution, 12.5 g, Intravenous, PRN, Gabriel DOSHI MD, 12.5 g at 09/06/20 1238    glucagon (rDNA) injection 1 mg, 1 mg, Intramuscular, PRN, Parul Tim MD    dextrose 5 % solution, 100 mL/hr, Intravenous, PRN, Gabriel DOSHI MD, Last Rate: 100 mL/hr at 09/06/20 1240, 100 mL/hr at 09/06/20 1240    0.9 % sodium chloride bolus, 20 mL, Intravenous, Once, Parul Tim MD, Stopped at 09/03/20 2341    Assessment/Plan:  64-year-old gentleman who we are consulted on for his mild pancytopenia. Abdominal ultrasound revealed hepatosplenomegaly with spleen 15.3 cm and liver 18.9 cm. His anemia is multifactorial secondary to renal insufficiency and his duodenitis and gastritis. Mild leukopenia and thrombocytopenia secondary to splenic sequestration from enlarged spleen.  - Patient with stage IV CKD and anemia. Ferritin 77 and iron saturation 19%. He will require iron repletion and then he would be a candidate for Aranesp for renal insufficiency anemia.  - GI evaluation ongoing.  -We will continue following with you.     Electronically signed by Meghann Sutherland MD on 9/7/2020 at 2:20 PM

## 2020-09-07 NOTE — PROGRESS NOTES
[facility] MD Jacki Blanco, MD Varsha Valero MD Sharran Ishikawa, AMY Walter, RUTH  Consultation  Patient's Name: Terrie Whitt  12:00 PM  9/7/2020    Sub : in NAD . UO bumex iv     History of Present Ilness:    Mr. Karen Man is a pleasant 68-year-old gentleman who is a resident at assisted living and has a past medical history notable for diabetes, dyslipidemia, hypertension, smoking, COPD and history of alcohol abuse. Denies known renal disease. Routine laboratory studies revealed pancytopenia including WBC 2.9, hemoglobin 6.3, platelets 445, as well as elevated BUN/creatinine 57 and 4.3, respectively. Last known BUN/creatinine were 29 and 2.6, respectively, in April of this year. He is a rather vague historian, does not know medications he takes a much about his underlying medical diseases. He is received 2 units PRBCs. He underwent EGD today revealing duodenitis, gastritis, but no site of acute bleed. He is for colonoscopy in the near future. He denies NSAIDs. No recent course of antimicrobials known to him. Denies lightheadedness, chest pain or palpitation, shortness of breath such as exertion, cough wheeze or sputum production. No recent fever or chill. Does have fatigue generalized weakness. No recent diarrhea, constipation, melena hematochezia though he does note some dark stools recently. No dysuria or hematuria, no urinary hesitancy or other changes urinary habits. No flank pain. Denies mental or visual status changes.     Past Medical History:   Diagnosis Date    CHF (congestive heart failure) (HCC)     COPD (chronic obstructive pulmonary disease) (HCC)     Diabetes mellitus ([facility] [address]     Hyperlipidemia     Hypertension        Past Surgical History:   Procedure Laterality Date    UPPER GASTROINTESTINAL ENDOSCOPY N/A 9/4/2020    EGD ESOPHAGOGASTRODUODENOSCOPY performed by Matthew Payne MD at [facility] US RETROPERITONEAL LIMITED    (Results Pending)       Assessment  1. Acute kidney injury, presumptively hemodynamically mediated due to severe anemia, the differential diagnosis at this point remains broad. 2. Chronic kidney disease. Last known creatinine was 2.6 mg/dL, which would be consistent with estimated GFR of 25 mL/min, and CKD stage IV, though at this time no other creatinine values are known. Presumptive etiology is diabetic nephropathy, and renal microvascular atherosclerotic disease/hypertensive nephrosclerosis. 3. Anemia, likely least part secondary to CKD, and GI bleed, though this would not explain the pancytopenia. Evaluation per hematology is underway. EGD demonstrates duodenitis and gastritis though no site of active bleed. Colonoscopy to follow. 4. Hypertension, likely essential.    Recommendations      Cr at 4 .  He might not be that far from the need for renal replacement therapy   Continue diuresing as tolerated   Am labs      Electronically signed by Navya Colon MD on 9/7/2020

## 2020-09-07 NOTE — PROGRESS NOTES
Subjective: The patient is awake and alert. Feels much better than presentation. Status post EGD (9/4/2020). No problems overnight. Denies chest pain, angina, and dyspnea. Denies abdominal pain. Tolerating diet. No nausea or vomiting. Wants to be discharged. Objective:    BP (!) 155/72   Pulse 72   Temp 98.3 °F (36.8 °C) (Oral)   Resp 16   Ht 5' 10.5\" (1.791 m)   Wt 279 lb 4 oz (126.7 kg)   SpO2 95%   BMI 39.50 kg/m²     Current medications that patient is taking have been reviewed. Heart:  RRR, no murmurs, gallops, or rubs.   Lungs:  CTA bilaterally, no wheeze, rales or rhonchi  Abd: bowel sounds present, soft, nontender, nondistended, no masses  Extrem:  Lower extremity edema    CBC with Differential:    Lab Results   Component Value Date    WBC 3.5 09/07/2020    RBC 2.84 09/07/2020    HGB 8.7 09/07/2020    HCT 28.4 09/07/2020     09/07/2020    .0 09/07/2020    MCH 30.6 09/07/2020    MCHC 30.6 09/07/2020    RDW 14.6 09/07/2020    LYMPHOPCT 14.4 09/07/2020    MONOPCT 10.6 09/07/2020    BASOPCT 0.6 09/07/2020    MONOSABS 0.37 09/07/2020    LYMPHSABS 0.50 09/07/2020    EOSABS 0.12 09/07/2020    BASOSABS 0.02 09/07/2020     CMP:    Lab Results   Component Value Date     09/07/2020    K 4.5 09/07/2020    K 4.4 09/03/2020     09/07/2020    CO2 26 09/07/2020    BUN 39 09/07/2020    CREATININE 4.1 09/07/2020    GFRAA 18 09/07/2020    LABGLOM 15 09/07/2020    GLUCOSE 86 09/07/2020    PROT 6.6 09/07/2020    LABALBU 3.3 09/07/2020    CALCIUM 9.0 09/07/2020    BILITOT 0.3 09/07/2020    ALKPHOS 49 09/07/2020    AST 16 09/07/2020    ALT 11 09/07/2020     BMP:    Lab Results   Component Value Date     09/07/2020    K 4.5 09/07/2020    K 4.4 09/03/2020     09/07/2020    CO2 26 09/07/2020    BUN 39 09/07/2020    LABALBU 3.3 09/07/2020    CREATININE 4.1 09/07/2020    CALCIUM 9.0 09/07/2020    GFRAA 18 09/07/2020    LABGLOM 15 09/07/2020    GLUCOSE 86 09/07/2020

## 2020-09-08 LAB
ALBUMIN SERPL-MCNC: 3 G/DL (ref 3.5–5.2)
ALP BLD-CCNC: 44 U/L (ref 40–129)
ALT SERPL-CCNC: 9 U/L (ref 0–40)
ANION GAP SERPL CALCULATED.3IONS-SCNC: 8 MMOL/L (ref 7–16)
AST SERPL-CCNC: 12 U/L (ref 0–39)
BASOPHILIC STIPPLING: ABNORMAL
BASOPHILS ABSOLUTE: 0.02 E9/L (ref 0–0.2)
BASOPHILS RELATIVE PERCENT: 0.8 % (ref 0–2)
BILIRUB SERPL-MCNC: 0.2 MG/DL (ref 0–1.2)
BUN BLDV-MCNC: 43 MG/DL (ref 8–23)
CALCIUM SERPL-MCNC: 8.6 MG/DL (ref 8.6–10.2)
CHLORIDE BLD-SCNC: 103 MMOL/L (ref 98–107)
CO2: 27 MMOL/L (ref 22–29)
CREAT SERPL-MCNC: 4.4 MG/DL (ref 0.7–1.2)
EOSINOPHILS ABSOLUTE: 0.08 E9/L (ref 0.05–0.5)
EOSINOPHILS RELATIVE PERCENT: 3.4 % (ref 0–6)
GFR AFRICAN AMERICAN: 17
GFR NON-AFRICAN AMERICAN: 14 ML/MIN/1.73
GLUCOSE BLD-MCNC: 116 MG/DL (ref 74–99)
HCT VFR BLD CALC: 25 % (ref 37–54)
HEMOGLOBIN: 7.7 G/DL (ref 12.5–16.5)
IMMATURE GRANULOCYTES #: 0.01 E9/L
IMMATURE GRANULOCYTES %: 0.4 % (ref 0–5)
LYMPHOCYTES ABSOLUTE: 0.44 E9/L (ref 1.5–4)
LYMPHOCYTES RELATIVE PERCENT: 18.5 % (ref 20–42)
MAGNESIUM: 2.1 MG/DL (ref 1.6–2.6)
MCH RBC QN AUTO: 30.7 PG (ref 26–35)
MCHC RBC AUTO-ENTMCNC: 30.8 % (ref 32–34.5)
MCV RBC AUTO: 99.6 FL (ref 80–99.9)
METER GLUCOSE: 147 MG/DL (ref 74–99)
METER GLUCOSE: 231 MG/DL (ref 74–99)
METER GLUCOSE: 250 MG/DL (ref 74–99)
METER GLUCOSE: 269 MG/DL (ref 74–99)
MONOCYTES ABSOLUTE: 0.33 E9/L (ref 0.1–0.95)
MONOCYTES RELATIVE PERCENT: 13.9 % (ref 2–12)
NEUTROPHILS ABSOLUTE: 1.5 E9/L (ref 1.8–7.3)
NEUTROPHILS RELATIVE PERCENT: 63 % (ref 43–80)
PDW BLD-RTO: 14.2 FL (ref 11.5–15)
PLATELET # BLD: 88 E9/L (ref 130–450)
PLATELET CONFIRMATION: NORMAL
PMV BLD AUTO: 9.5 FL (ref 7–12)
POLYCHROMASIA: ABNORMAL
POTASSIUM SERPL-SCNC: 4.2 MMOL/L (ref 3.5–5)
RBC # BLD: 2.51 E12/L (ref 3.8–5.8)
SODIUM BLD-SCNC: 138 MMOL/L (ref 132–146)
TOTAL PROTEIN: 6 G/DL (ref 6.4–8.3)
WBC # BLD: 2.4 E9/L (ref 4.5–11.5)

## 2020-09-08 PROCEDURE — 80053 COMPREHEN METABOLIC PANEL: CPT

## 2020-09-08 PROCEDURE — 85025 COMPLETE CBC W/AUTO DIFF WBC: CPT

## 2020-09-08 PROCEDURE — 1200000000 HC SEMI PRIVATE

## 2020-09-08 PROCEDURE — 36415 COLL VENOUS BLD VENIPUNCTURE: CPT

## 2020-09-08 PROCEDURE — 6370000000 HC RX 637 (ALT 250 FOR IP): Performed by: INTERNAL MEDICINE

## 2020-09-08 PROCEDURE — 2700000000 HC OXYGEN THERAPY PER DAY

## 2020-09-08 PROCEDURE — 6370000000 HC RX 637 (ALT 250 FOR IP): Performed by: SURGERY

## 2020-09-08 PROCEDURE — 82962 GLUCOSE BLOOD TEST: CPT

## 2020-09-08 PROCEDURE — 2580000003 HC RX 258: Performed by: SURGERY

## 2020-09-08 PROCEDURE — 2500000003 HC RX 250 WO HCPCS: Performed by: INTERNAL MEDICINE

## 2020-09-08 PROCEDURE — 83735 ASSAY OF MAGNESIUM: CPT

## 2020-09-08 PROCEDURE — 2580000003 HC RX 258: Performed by: INTERNAL MEDICINE

## 2020-09-08 RX ADMIN — PANTOPRAZOLE SODIUM 40 MG: 40 TABLET, DELAYED RELEASE ORAL at 06:35

## 2020-09-08 RX ADMIN — INSULIN LISPRO 3 UNITS: 100 INJECTION, SOLUTION INTRAVENOUS; SUBCUTANEOUS at 07:57

## 2020-09-08 RX ADMIN — DILTIAZEM HYDROCHLORIDE 240 MG: 240 CAPSULE, COATED, EXTENDED RELEASE ORAL at 07:55

## 2020-09-08 RX ADMIN — SODIUM CHLORIDE, PRESERVATIVE FREE 10 ML: 5 INJECTION INTRAVENOUS at 08:02

## 2020-09-08 RX ADMIN — INSULIN LISPRO 5 UNITS: 100 INJECTION, SOLUTION INTRAVENOUS; SUBCUTANEOUS at 21:05

## 2020-09-08 RX ADMIN — TRIAMCINOLONE ACETONIDE: 1 CREAM TOPICAL at 08:02

## 2020-09-08 RX ADMIN — ACETAMINOPHEN 650 MG: 325 TABLET ORAL at 08:07

## 2020-09-08 RX ADMIN — INSULIN LISPRO 6 UNITS: 100 INJECTION, SOLUTION INTRAVENOUS; SUBCUTANEOUS at 11:29

## 2020-09-08 RX ADMIN — CEFUROXIME AXETIL 500 MG: 500 TABLET ORAL at 08:01

## 2020-09-08 RX ADMIN — FENOFIBRATE 54 MG: 54 TABLET ORAL at 21:02

## 2020-09-08 RX ADMIN — DOXEPIN HYDROCHLORIDE 25 MG: 25 CAPSULE ORAL at 21:02

## 2020-09-08 RX ADMIN — CEFUROXIME AXETIL 500 MG: 500 TABLET ORAL at 21:02

## 2020-09-08 RX ADMIN — OYSTER SHELL CALCIUM WITH VITAMIN D 1 TABLET: 500; 200 TABLET, FILM COATED ORAL at 21:02

## 2020-09-08 RX ADMIN — INSULIN GLARGINE 69 UNITS: 100 INJECTION, SOLUTION SUBCUTANEOUS at 07:57

## 2020-09-08 RX ADMIN — CLONIDINE HYDROCHLORIDE 0.1 MG: 0.1 TABLET ORAL at 15:25

## 2020-09-08 RX ADMIN — SODIUM CHLORIDE, PRESERVATIVE FREE 10 ML: 5 INJECTION INTRAVENOUS at 21:02

## 2020-09-08 RX ADMIN — TRIAMCINOLONE ACETONIDE: 1 CREAM TOPICAL at 21:03

## 2020-09-08 RX ADMIN — INSULIN LISPRO 9 UNITS: 100 INJECTION, SOLUTION INTRAVENOUS; SUBCUTANEOUS at 16:29

## 2020-09-08 RX ADMIN — BUMETANIDE 0.5 MG/HR: 0.25 INJECTION INTRAMUSCULAR; INTRAVENOUS at 16:34

## 2020-09-08 RX ADMIN — ARIPIPRAZOLE 2.5 MG: 5 TABLET ORAL at 07:56

## 2020-09-08 RX ADMIN — CLONIDINE HYDROCHLORIDE 0.1 MG: 0.1 TABLET ORAL at 07:55

## 2020-09-08 RX ADMIN — CLONIDINE HYDROCHLORIDE 0.1 MG: 0.1 TABLET ORAL at 20:00

## 2020-09-08 RX ADMIN — CHOLECALCIFEROL TAB 50 MCG (2000 UNIT) 4000 UNITS: 50 TAB at 07:55

## 2020-09-08 RX ADMIN — OYSTER SHELL CALCIUM WITH VITAMIN D 1 TABLET: 500; 200 TABLET, FILM COATED ORAL at 07:55

## 2020-09-08 RX ADMIN — CITALOPRAM HYDROBROMIDE 20 MG: 20 TABLET ORAL at 07:55

## 2020-09-08 ASSESSMENT — PAIN SCALES - GENERAL
PAINLEVEL_OUTOF10: 4
PAINLEVEL_OUTOF10: 6

## 2020-09-08 NOTE — PROGRESS NOTES
Subjective: The patient is awake and alert. Feels much better than presentation. Status post EGD (9/4/2020). No problems overnight. Denies chest pain, angina, and dyspnea. Denies abdominal pain. Tolerating diet. No nausea or vomiting. Wants to be discharged. Objective:    BP (!) 170/88   Pulse 86   Temp 97.8 °F (36.6 °C) (Oral)   Resp 18   Ht 5' 10.5\" (1.791 m)   Wt 279 lb 4 oz (126.7 kg)   SpO2 100%   BMI 39.50 kg/m²     Current medications that patient is taking have been reviewed. Heart:  RRR, no murmurs, gallops, or rubs.   Lungs:  CTA bilaterally, no wheeze, rales or rhonchi  Abd: bowel sounds present, soft, nontender, nondistended, no masses  Extrem:  Lower extremity edema    CBC with Differential:    Lab Results   Component Value Date    WBC 2.4 09/08/2020    RBC 2.51 09/08/2020    HGB 7.7 09/08/2020    HCT 25.0 09/08/2020    PLT 88 09/08/2020    MCV 99.6 09/08/2020    MCH 30.7 09/08/2020    MCHC 30.8 09/08/2020    RDW 14.2 09/08/2020    LYMPHOPCT 18.5 09/08/2020    MONOPCT 13.9 09/08/2020    BASOPCT 0.8 09/08/2020    MONOSABS 0.33 09/08/2020    LYMPHSABS 0.44 09/08/2020    EOSABS 0.08 09/08/2020    BASOSABS 0.02 09/08/2020     CMP:    Lab Results   Component Value Date     09/08/2020    K 4.2 09/08/2020    K 4.4 09/03/2020     09/08/2020    CO2 27 09/08/2020    BUN 43 09/08/2020    CREATININE 4.4 09/08/2020    GFRAA 17 09/08/2020    LABGLOM 14 09/08/2020    GLUCOSE 116 09/08/2020    PROT 6.0 09/08/2020    LABALBU 3.0 09/08/2020    CALCIUM 8.6 09/08/2020    BILITOT 0.2 09/08/2020    ALKPHOS 44 09/08/2020    AST 12 09/08/2020    ALT 9 09/08/2020     BMP:    Lab Results   Component Value Date     09/08/2020    K 4.2 09/08/2020    K 4.4 09/03/2020     09/08/2020    CO2 27 09/08/2020    BUN 43 09/08/2020    LABALBU 3.0 09/08/2020    CREATININE 4.4 09/08/2020    CALCIUM 8.6 09/08/2020    GFRAA 17 09/08/2020    LABGLOM 14 09/08/2020    GLUCOSE 116 09/08/2020

## 2020-09-08 NOTE — PROGRESS NOTES
Imaging:  XR CHEST PORTABLE   Final Result   CHF. Right pleural effusion with adjacent atelectasis and/or   infiltrate. Tiny left pleural effusion. US ABDOMEN COMPLETE   Final Result   1. Hepatosplenomegaly. 2. Findings suggestive of chronic renal disease. 3. Right pleural effusion. Assessment  1. Acute kidney injury, presumptively hemodynamically mediated due to severe anemia, the differential diagnosis at this point remains broad. 2. Chronic kidney disease. Last known creatinine was 2.6 mg/dL, which would be consistent with estimated GFR of 25 mL/min, and CKD stage IV, though at this time no other creatinine values are known. Presumptive etiology is diabetic nephropathy, and renal microvascular atherosclerotic disease/hypertensive nephrosclerosis in the setting of known coronary and peripheral arterial disease. .  3. Anemia, likely least part secondary to CKD, and GI bleed, though this would not explain the pancytopenia. Evaluation per hematology is underway. EGD demonstrates duodenitis and gastritis though no site of active bleed. Colonoscopy to follow. Iron stores are within normal limits. 4. Hypertension, likely essential.      - Cr at 4.4 --no real change in his presentation. He might not be that far from the need for renal replacement therapy.   Acute HD not warranted at this point      Continue diuresing as tolerated   Likely to benefit from [place]; will start Retacrit  Follow labs, UO   Avoid nephrotoxins      Electronically signed by Barb Cota MD on 9/8/2020

## 2020-09-08 NOTE — PROGRESS NOTES
sodium chloride 0.9 % 125 mL infusion, 0.5 mg/hr, Intravenous, Continuous, Kandi Topete MD, Last Rate: 5 mL/hr at 09/07/20 1151, 0.5 mg/hr at 09/07/20 1151    pantoprazole (PROTONIX) tablet 40 mg, 40 mg, Oral, QAM AC, Anali Villalobos MD, 40 mg at 09/08/20 0635    insulin lispro (HUMALOG) injection vial 0-18 Units, 0-18 Units, Subcutaneous, TID WC, Anali Villalobos MD, 6 Units at 09/08/20 1129    insulin lispro (HUMALOG) injection vial 0-9 Units, 0-9 Units, Subcutaneous, Nightly, Anali Villalobos MD, 2 Units at 09/07/20 2004    sodium chloride flush 0.9 % injection 10 mL, 10 mL, Intravenous, 2 times per day, Breezy Menendez MD, 10 mL at 09/08/20 0802    sodium chloride flush 0.9 % injection 10 mL, 10 mL, Intravenous, PRN, Breezy Menendez MD    acetaminophen (TYLENOL) tablet 650 mg, 650 mg, Oral, Q6H PRN, 650 mg at 09/08/20 0807 **OR** acetaminophen (TYLENOL) suppository 650 mg, 650 mg, Rectal, Q6H PRN, Breezy Menendez MD    polyethylene glycol (GLYCOLAX) packet 17 g, 17 g, Oral, Daily PRN, Breezy Menendez MD    promethazine (PHENERGAN) tablet 12.5 mg, 12.5 mg, Oral, Q6H PRN **OR** ondansetron (ZOFRAN) injection 4 mg, 4 mg, Intravenous, Q6H PRN, Breezy Menendez MD    ARIPiprazole (ABILIFY) tablet 2.5 mg, 2.5 mg, Oral, Daily, Breezy Menendez MD, 2.5 mg at 09/08/20 0756    calcium-vitamin D 500-200 MG-UNIT per tablet 1 tablet, 1 tablet, Oral, BID, Breezy Menendez MD, 1 tablet at 09/08/20 0755    vitamin D (CHOLECALCIFEROL) tablet 4,000 Units, 4,000 Units, Oral, Daily, Breezy Menendez MD, 4,000 Units at 09/08/20 0755    cefUROXime (CEFTIN) tablet 500 mg, 500 mg, Oral, BID, Vandana DOSHI MD, 500 mg at 09/08/20 0801    citalopram (CELEXA) tablet 20 mg, 20 mg, Oral, Daily, Vandana DOSHI MD, 20 mg at 09/08/20 0755    cloNIDine (CATAPRES) tablet 0.1 mg, 0.1 mg, Oral, TID, Vandana DOSHI MD, 0.1 mg at 09/08/20 0755    dilTIAZem (CARDIZEM CD) extended release capsule 240 mg, 240 mg, Oral, Daily, Radha DOSHI MD, 240 mg at 09/08/20 0755    doxepin (SINEQUAN) capsule 25 mg, 25 mg, Oral, Nightly, Radha DOSHI MD, 25 mg at 09/07/20 2008    fenofibrate (TRICOR) tablet 54 mg, 54 mg, Oral, Nightly, Cj Juárez MD, 54 mg at 09/07/20 2004    insulin glargine (LANTUS) injection vial 69 Units, 69 Units, Subcutaneous, QAM, Cj Juárez MD, 69 Units at 09/08/20 0757    triamcinolone (KENALOG) 0.1 % cream, , Topical, BID, Cj Juárez MD    glucose (GLUTOSE) 40 % oral gel 15 g, 15 g, Oral, PRN, Cj Juárez MD    dextrose 50 % IV solution, 12.5 g, Intravenous, PRN, Radha DOSHI MD, 12.5 g at 09/06/20 1238    glucagon (rDNA) injection 1 mg, 1 mg, Intramuscular, PRN, Cj Juárez MD    dextrose 5 % solution, 100 mL/hr, Intravenous, PRN, Cj Juárez MD, Last Rate: 100 mL/hr at 09/06/20 1240, 100 mL/hr at 09/06/20 1240    0.9 % sodium chloride bolus, 20 mL, Intravenous, Once, Cj Juárez MD, Stopped at 09/03/20 2341    Assessment:    Principal Problem:    GI bleed  Active Problems:    Acute blood loss anemia    Essential hypertension    COPD (chronic obstructive pulmonary disease) ([facility])    Tobacco abuse    Depression    Psychiatric disorder    Diabetes mellitus type 2, uncontrolled (formerly [facility])    Hyperlipidemia LDL goal <100    CKD (chronic kidney disease) stage 4, GFR 15-29 ml/min (formerly [facility])    Acute kidney injury ([facility] [address]  Resolved Problems:    Pancytopenia ([address]    Acute on chronic kidney failure (HCC)    Diabetes mellitus ([address]    HLD (hyperlipidemia)    CHF (congestive heart failure) ([facility] [address]    68-year-old male with history of chronic kidney disease, baseline creatinine around 3 presented from assisted living facility for abnormal labs. He was found to be pancytopenic on admission. He was also found to be Hemoccult positive, patient does report black tarry stools the past couple weeks.  He is s/p EGD showing no active bleed but + for duodenitis and gastritis. Abdominal ultrasound revealed hepatosplenomegaly with spleen 15.3 cm and liver 18.9 cm.       Plan:  Monitor CBC daily  No transfusion indicated today  Anemia is multifactorial secondary to renal insufficiency and his duodenitis and gastritis  Mild leukopenia and thrombocytopenia secondary to splenic sequestration from enlarged spleen  Would be candidate for Arnesp therapy at the [facility] once repleated with iron  Will continue to follow    Electronically signed by RICKY Burgess NP on 9/8/2020 at 2:48 PM

## 2020-09-09 LAB
ALBUMIN SERPL-MCNC: 3.1 G/DL (ref 3.5–4.7)
ALPHA-1-GLOBULIN: 0.2 G/DL (ref 0.2–0.4)
ALPHA-2-GLOBULIN: 0.8 G/FL (ref 0.5–1)
ANION GAP SERPL CALCULATED.3IONS-SCNC: 7 MMOL/L (ref 7–16)
BASOPHILS ABSOLUTE: 0.01 E9/L (ref 0–0.2)
BASOPHILS RELATIVE PERCENT: 0.4 % (ref 0–2)
BETA GLOBULIN: 0.9 G/DL (ref 0.8–1.3)
BUN BLDV-MCNC: 48 MG/DL (ref 8–23)
CALCIUM SERPL-MCNC: 9.1 MG/DL (ref 8.6–10.2)
CHLORIDE BLD-SCNC: 99 MMOL/L (ref 98–107)
CO2: 31 MMOL/L (ref 22–29)
CREAT SERPL-MCNC: 4.8 MG/DL (ref 0.7–1.2)
ELECTROPHORESIS: ABNORMAL
EOSINOPHILS ABSOLUTE: 0.08 E9/L (ref 0.05–0.5)
EOSINOPHILS RELATIVE PERCENT: 2.9 % (ref 0–6)
GAMMA GLOBULIN: 1.4 G/DL (ref 0.7–1.6)
GFR AFRICAN AMERICAN: 15
GFR NON-AFRICAN AMERICAN: 12 ML/MIN/1.73
GLUCOSE BLD-MCNC: 61 MG/DL (ref 74–99)
HCT VFR BLD CALC: 24.5 % (ref 37–54)
HEMOGLOBIN: 7.7 G/DL (ref 12.5–16.5)
IMMATURE GRANULOCYTES #: 0.01 E9/L
IMMATURE GRANULOCYTES %: 0.4 % (ref 0–5)
LYMPHOCYTES ABSOLUTE: 0.41 E9/L (ref 1.5–4)
LYMPHOCYTES RELATIVE PERCENT: 14.8 % (ref 20–42)
MAGNESIUM: 2.1 MG/DL (ref 1.6–2.6)
MCH RBC QN AUTO: 30.7 PG (ref 26–35)
MCHC RBC AUTO-ENTMCNC: 31.4 % (ref 32–34.5)
MCV RBC AUTO: 97.6 FL (ref 80–99.9)
METER GLUCOSE: 134 MG/DL (ref 74–99)
METER GLUCOSE: 184 MG/DL (ref 74–99)
METER GLUCOSE: 258 MG/DL (ref 74–99)
METER GLUCOSE: 306 MG/DL (ref 74–99)
METER GLUCOSE: 61 MG/DL (ref 74–99)
METER GLUCOSE: 69 MG/DL (ref 74–99)
MONOCYTES ABSOLUTE: 0.24 E9/L (ref 0.1–0.95)
MONOCYTES RELATIVE PERCENT: 8.7 % (ref 2–12)
NEUTROPHILS ABSOLUTE: 2.02 E9/L (ref 1.8–7.3)
NEUTROPHILS RELATIVE PERCENT: 72.8 % (ref 43–80)
PDW BLD-RTO: 14 FL (ref 11.5–15)
PHOSPHORUS: 5.1 MG/DL (ref 2.5–4.5)
PLATELET # BLD: 97 E9/L (ref 130–450)
PLATELET CONFIRMATION: NORMAL
PMV BLD AUTO: 9.9 FL (ref 7–12)
POTASSIUM SERPL-SCNC: 4.2 MMOL/L (ref 3.5–5)
RBC # BLD: 2.51 E12/L (ref 3.8–5.8)
SODIUM BLD-SCNC: 137 MMOL/L (ref 132–146)
TOTAL PROTEIN: 6.4 G/DL (ref 6.4–8.3)
WBC # BLD: 2.8 E9/L (ref 4.5–11.5)

## 2020-09-09 PROCEDURE — 6360000002 HC RX W HCPCS: Performed by: INTERNAL MEDICINE

## 2020-09-09 PROCEDURE — 6370000000 HC RX 637 (ALT 250 FOR IP): Performed by: SURGERY

## 2020-09-09 PROCEDURE — 83735 ASSAY OF MAGNESIUM: CPT

## 2020-09-09 PROCEDURE — 84100 ASSAY OF PHOSPHORUS: CPT

## 2020-09-09 PROCEDURE — 85025 COMPLETE CBC W/AUTO DIFF WBC: CPT

## 2020-09-09 PROCEDURE — 6370000000 HC RX 637 (ALT 250 FOR IP): Performed by: INTERNAL MEDICINE

## 2020-09-09 PROCEDURE — 80048 BASIC METABOLIC PNL TOTAL CA: CPT

## 2020-09-09 PROCEDURE — 36415 COLL VENOUS BLD VENIPUNCTURE: CPT

## 2020-09-09 PROCEDURE — 2700000000 HC OXYGEN THERAPY PER DAY

## 2020-09-09 PROCEDURE — 1200000000 HC SEMI PRIVATE

## 2020-09-09 PROCEDURE — 2580000003 HC RX 258: Performed by: SURGERY

## 2020-09-09 PROCEDURE — 82962 GLUCOSE BLOOD TEST: CPT

## 2020-09-09 RX ADMIN — INSULIN LISPRO 3 UNITS: 100 INJECTION, SOLUTION INTRAVENOUS; SUBCUTANEOUS at 10:58

## 2020-09-09 RX ADMIN — INSULIN LISPRO 6 UNITS: 100 INJECTION, SOLUTION INTRAVENOUS; SUBCUTANEOUS at 21:11

## 2020-09-09 RX ADMIN — ARIPIPRAZOLE 2.5 MG: 5 TABLET ORAL at 08:33

## 2020-09-09 RX ADMIN — INSULIN LISPRO 9 UNITS: 100 INJECTION, SOLUTION INTRAVENOUS; SUBCUTANEOUS at 16:49

## 2020-09-09 RX ADMIN — EPOETIN ALFA-EPBX 8000 UNITS: 4000 INJECTION, SOLUTION INTRAVENOUS; SUBCUTANEOUS at 08:33

## 2020-09-09 RX ADMIN — TRIAMCINOLONE ACETONIDE: 1 CREAM TOPICAL at 08:34

## 2020-09-09 RX ADMIN — PANTOPRAZOLE SODIUM 40 MG: 40 TABLET, DELAYED RELEASE ORAL at 06:19

## 2020-09-09 RX ADMIN — DOXEPIN HYDROCHLORIDE 25 MG: 25 CAPSULE ORAL at 21:09

## 2020-09-09 RX ADMIN — CEFUROXIME AXETIL 500 MG: 500 TABLET ORAL at 08:33

## 2020-09-09 RX ADMIN — CITALOPRAM HYDROBROMIDE 20 MG: 20 TABLET ORAL at 08:33

## 2020-09-09 RX ADMIN — TRIAMCINOLONE ACETONIDE: 1 CREAM TOPICAL at 21:14

## 2020-09-09 RX ADMIN — CLONIDINE HYDROCHLORIDE 0.1 MG: 0.1 TABLET ORAL at 08:33

## 2020-09-09 RX ADMIN — OYSTER SHELL CALCIUM WITH VITAMIN D 1 TABLET: 500; 200 TABLET, FILM COATED ORAL at 08:33

## 2020-09-09 RX ADMIN — DILTIAZEM HYDROCHLORIDE 240 MG: 240 CAPSULE, COATED, EXTENDED RELEASE ORAL at 08:33

## 2020-09-09 RX ADMIN — CLONIDINE HYDROCHLORIDE 0.1 MG: 0.1 TABLET ORAL at 21:09

## 2020-09-09 RX ADMIN — OYSTER SHELL CALCIUM WITH VITAMIN D 1 TABLET: 500; 200 TABLET, FILM COATED ORAL at 21:09

## 2020-09-09 RX ADMIN — CEFUROXIME AXETIL 500 MG: 500 TABLET ORAL at 21:09

## 2020-09-09 RX ADMIN — SODIUM CHLORIDE, PRESERVATIVE FREE 10 ML: 5 INJECTION INTRAVENOUS at 21:10

## 2020-09-09 RX ADMIN — FENOFIBRATE 54 MG: 54 TABLET ORAL at 21:09

## 2020-09-09 RX ADMIN — CHOLECALCIFEROL TAB 50 MCG (2000 UNIT) 4000 UNITS: 50 TAB at 08:33

## 2020-09-09 RX ADMIN — CLONIDINE HYDROCHLORIDE 0.1 MG: 0.1 TABLET ORAL at 14:30

## 2020-09-09 ASSESSMENT — PAIN SCALES - GENERAL
PAINLEVEL_OUTOF10: 0

## 2020-09-09 NOTE — PROGRESS NOTES
Subjective: The patient is awake and alert. Feels much better than presentation. Status post EGD (9/4/2020). No problems overnight. Denies chest pain, angina, and dyspnea. Denies abdominal pain. Tolerating diet. No nausea or vomiting. Wants to be discharged. Objective:    BP (!) 152/70   Pulse 74   Temp 97.8 °F (36.6 °C) (Oral)   Resp 18   Ht 5' 10.5\" (1.791 m)   Wt 270 lb (122.5 kg)   SpO2 99%   BMI 38.19 kg/m²     Current medications that patient is taking have been reviewed. Heart:  RRR, no murmurs, gallops, or rubs.   Lungs:  CTA bilaterally, no wheeze, rales or rhonchi  Abd: bowel sounds present, soft, nontender, nondistended, no masses  Extrem:  Lower extremity edema    CBC with Differential:    Lab Results   Component Value Date    WBC 2.4 09/08/2020    RBC 2.51 09/08/2020    HGB 7.7 09/08/2020    HCT 25.0 09/08/2020    PLT 88 09/08/2020    MCV 99.6 09/08/2020    MCH 30.7 09/08/2020    MCHC 30.8 09/08/2020    RDW 14.2 09/08/2020    LYMPHOPCT 18.5 09/08/2020    MONOPCT 13.9 09/08/2020    BASOPCT 0.8 09/08/2020    MONOSABS 0.33 09/08/2020    LYMPHSABS 0.44 09/08/2020    EOSABS 0.08 09/08/2020    BASOSABS 0.02 09/08/2020     CMP:    Lab Results   Component Value Date     09/09/2020    K 4.2 09/09/2020    K 4.4 09/03/2020    CL 99 09/09/2020    CO2 31 09/09/2020    BUN 48 09/09/2020    CREATININE 4.8 09/09/2020    GFRAA 15 09/09/2020    LABGLOM 12 09/09/2020    GLUCOSE 61 09/09/2020    PROT 6.0 09/08/2020    LABALBU 3.0 09/08/2020    CALCIUM 9.1 09/09/2020    BILITOT 0.2 09/08/2020    ALKPHOS 44 09/08/2020    AST 12 09/08/2020    ALT 9 09/08/2020     BMP:    Lab Results   Component Value Date     09/09/2020    K 4.2 09/09/2020    K 4.4 09/03/2020    CL 99 09/09/2020    CO2 31 09/09/2020    BUN 48 09/09/2020    LABALBU 3.0 09/08/2020    CREATININE 4.8 09/09/2020    CALCIUM 9.1 09/09/2020    GFRAA 15 09/09/2020    LABGLOM 12 09/09/2020    GLUCOSE 61 09/09/2020     Magnesium: Lab Results   Component Value Date    MG 2.1 09/09/2020     Phosphorus:    Lab Results   Component Value Date    PHOS 5.1 09/09/2020     PT/INR:  No results found for: PROTIME, INR  PTT:  No results found for: APTT, PTT[APTT}     Assessment:    Patient Active Problem List   Diagnosis    GI bleed    Acute blood loss anemia    Essential hypertension    [facility]) [facility]    Tobacco abuse    Depression    Psychiatric disorder    Diabetes mellitus type 2, uncontrolled ([facility])    Hyperlipidemia LDL goal <100    CKD (chronic kidney disease) stage 4, GFR 15-29 ml/min ([facility])    Acute kidney injury ([facility] [address]       Plan:    Status post EGD. No active bleeding seen. General surgery on board. Transfused. Hgb better. Hem/onc on board. Blood pressure ok, continue current medications  Continue breathing treatments and supplemental oxygen. Discussed cessation. Continue psychiatric medications. As above. Blood glucose ok, continue to adjust basal/bolus insulin therapy  Continue aggressive lipid therapy  Cr up to 4.8 today. Nephrology on board. Hopefully stop bumex drip today  As above. Pt/Ot evaluations for discharge planning. Discharge soon.     Luna Mcgovern    8:47 AM  9/9/2020

## 2020-09-09 NOTE — CARE COORDINATION
Bun /cr sl elevated (bun 48/cr 4.5). continues on IV bumex gtt; I/O -4.5 liters. Plan at d/c is to return to [address] 7.7/25. will continue to monitor clinically. Ivanna Joe.

## 2020-09-09 NOTE — PROGRESS NOTES
Subjective: The patient is awake and alert in bed, has no complaints. Denies any pain. No fevers. No GI or  blood loss. No problems overnight. Denies chest pain, angina, dyspnea or abdominal discomfort. No nausea or vomiting. Tolerating diet. Objective:    BP (!) 152/70   Pulse 74   Temp 97.8 °F (36.6 °C) (Oral)   Resp 18   Ht 5' 10.5\" (1.791 m)   Wt 270 lb (122.5 kg)   SpO2 99%   BMI 38.19 kg/m²     General: NAD  HEENT: No thrush or mucositis, EOMI, PERRLA  Heart:  RRR, no murmurs, gallops, or rubs.   Lungs: Diminished bilaterally, no wheeze, rales or rhonchi  Abd: bowel sounds present, nontender, nondistended, no masses  Extrem:  R BKA, no clubbing, cyanosis, or edema  Lymphatics: No palpable adenopathy in cervical and supraclavicular regions  Skin: Intact, no petechia or purpura    CBC with Differential:    Lab Results   Component Value Date    WBC 2.4 09/08/2020    RBC 2.51 09/08/2020    HGB 7.7 09/08/2020    HCT 25.0 09/08/2020    PLT 88 09/08/2020    MCV 99.6 09/08/2020    MCH 30.7 09/08/2020    MCHC 30.8 09/08/2020    RDW 14.2 09/08/2020    LYMPHOPCT 18.5 09/08/2020    MONOPCT 13.9 09/08/2020    BASOPCT 0.8 09/08/2020    MONOSABS 0.33 09/08/2020    LYMPHSABS 0.44 09/08/2020    EOSABS 0.08 09/08/2020    BASOSABS 0.02 09/08/2020     CMP:    Lab Results   Component Value Date     09/09/2020    K 4.2 09/09/2020    K 4.4 09/03/2020    CL 99 09/09/2020    CO2 31 09/09/2020    BUN 48 09/09/2020    CREATININE 4.8 09/09/2020    GFRAA 15 09/09/2020    LABGLOM 12 09/09/2020    GLUCOSE 61 09/09/2020    PROT 6.0 09/08/2020    LABALBU 3.0 09/08/2020    CALCIUM 9.1 09/09/2020    BILITOT 0.2 09/08/2020    ALKPHOS 44 09/08/2020    AST 12 09/08/2020    ALT 9 09/08/2020        Current Facility-Administered Medications:     epoetin juliane-epbx (RETACRIT) injection 8,000 Units, 8,000 Units, Subcutaneous, Once per day on Mon Wed Fri, Anne Bond MD, 8,000 Units at 09/09/20 4325    bumetanide ([facility]) 12.5 mg in sodium chloride 0.9 % 125 mL infusion, 0.5 mg/hr, Intravenous, Continuous, Roxy Leslie MD, Last Rate: 5 mL/hr at 09/08/20 1634, 0.5 mg/hr at 09/08/20 1634    pantoprazole (PROTONIX) tablet 40 mg, 40 mg, Oral, QAM AC, Maia Aguayo MD, 40 mg at 09/09/20 0619    insulin lispro (HUMALOG) injection vial 0-18 Units, 0-18 Units, Subcutaneous, TID WC, Maia Aguayo MD, 9 Units at 09/08/20 1629    insulin lispro (HUMALOG) injection vial 0-9 Units, 0-9 Units, Subcutaneous, Nightly, Maia Aguayo MD, 5 Units at 09/08/20 2105    sodium chloride flush 0.9 % injection 10 mL, 10 mL, Intravenous, 2 times per day, Carmencita Ruiz MD, Stopped at 09/09/20 1807    sodium chloride flush 0.9 % injection 10 mL, 10 mL, Intravenous, PRN, Carmencita Ruiz MD    acetaminophen (TYLENOL) tablet 650 mg, 650 mg, Oral, Q6H PRN, 650 mg at 09/08/20 0807 **OR** acetaminophen (TYLENOL) suppository 650 mg, 650 mg, Rectal, Q6H PRN, Carmencita Ruiz MD    polyethylene glycol (GLYCOLAX) packet 17 g, 17 g, Oral, Daily PRN, Carmencita Ruiz MD    promethazine (PHENERGAN) tablet 12.5 mg, 12.5 mg, Oral, Q6H PRN **OR** ondansetron (ZOFRAN) injection 4 mg, 4 mg, Intravenous, Q6H PRN, Carmencita Ruiz MD    ARIPiprazole (ABILIFY) tablet 2.5 mg, 2.5 mg, Oral, Daily, Carmencita Ruiz MD, 2.5 mg at 09/09/20 1284    calcium-vitamin D 500-200 MG-UNIT per tablet 1 tablet, 1 tablet, Oral, BID, Carmencita Ruiz MD, 1 tablet at 09/09/20 2328    vitamin D (CHOLECALCIFEROL) tablet 4,000 Units, 4,000 Units, Oral, Daily, Carmencita Ruiz MD, 4,000 Units at 09/09/20 1019    cefUROXime (CEFTIN) tablet 500 mg, 500 mg, Oral, BID, Gabriel DOSHI MD, 500 mg at 09/09/20 4847    citalopram (CELEXA) tablet 20 mg, 20 mg, Oral, Daily, Gabrieltimmy DOSHI MD, 20 mg at 09/09/20 3838    cloNIDine (CATAPRES) tablet 0.1 mg, 0.1 mg, Oral, TID, Gabriel DOSHI MD, 0.1 mg at 09/09/20 0833    dilTIAZem (CARDIZEM CD) extended release capsule 240 mg, 240 mg, Oral, Daily, Lon DOSHI MD, 240 mg at 09/09/20 8224    doxepin (SINEQUAN) capsule 25 mg, 25 mg, Oral, Nightly, Lon DOSHI MD, 25 mg at 09/08/20 2102    fenofibrate (TRICOR) tablet 54 mg, 54 mg, Oral, Nightly, Celina Valdovinos MD, 54 mg at 09/08/20 2102    insulin glargine (LANTUS) injection vial 69 Units, 69 Units, Subcutaneous, QAM, Celina Valdovinos MD, Stopped at 09/09/20 0800    triamcinolone (KENALOG) 0.1 % cream, , Topical, BID, Celina Valdovinos MD    glucose (GLUTOSE) 40 % oral gel 15 g, 15 g, Oral, PRN, Celina Valdovinos MD    dextrose 50 % IV solution, 12.5 g, Intravenous, PRN, Lon DOSHI MD, 12.5 g at 09/06/20 1238    glucagon (rDNA) injection 1 mg, 1 mg, Intramuscular, PRN, Celina Valdovinos MD    dextrose 5 % solution, 100 mL/hr, Intravenous, PRN, Celina Valdovinos MD, Last Rate: 100 mL/hr at 09/06/20 1240, 100 mL/hr at 09/06/20 1240    0.9 % sodium chloride bolus, 20 mL, Intravenous, Once, Celina Valdovinos MD, Stopped at 09/03/20 2341    Assessment:    Principal Problem:    GI bleed  Active Problems:    Acute blood loss anemia    Essential hypertension    COPD (chronic obstructive pulmonary disease) ([facility]    Tobacco abuse    Depression    Psychiatric disorder    Diabetes mellitus type 2, uncontrolled ([facility])    Hyperlipidemia LDL goal <100    CKD (chronic kidney disease) stage 4, GFR 15-29 ml/min ([facility])    Acute kidney injury ([facility] [address]  Resolved Problems:    Pancytopenia ([address]    Acute on chronic kidney failure (HCC)    Diabetes mellitus ([address]    HLD (hyperlipidemia)    CHF (congestive heart failure) ([facility] [address]      57-year-old male with history of chronic kidney disease, baseline creatinine around 3 presented from assisted living facility for abnormal labs. [facility] was found to be pancytopenic on admission. [facility] was also found to be Hemoccult positive, patient does report black tarry stools the past couple weeks.  He is s/p EGD showing no active bleed

## 2020-09-09 NOTE — PROGRESS NOTES
[facility] MD Saima Chand, MD Roni Goodell, MD Nghia Hansen, MD Cheryl Martínez, CNP   Hernán Gates, ANP  Consultation  Patient's Name: Yancy Flores  12:53 PM  9/9/2020    Subjective  9/7:  In NAD . UO.  bumex iv   9/8: Feeling little bit better. Good appetite. Denies dyspnea at rest on room air. No chest pain or palpitations. Some low back pain. Still swollen, though better than on Friday. 9/9: Resting comfortably. No new complaint. Swelling improving. No dyspnea at rest on room air supine      Allergies:  Patient has no known allergies.     Current Medications:    epoetin juliane-epbx (RETACRIT) injection 8,000 Units, Once per day on Mon Wed Fri  pantoprazole (PROTONIX) tablet 40 mg, QAM AC  insulin lispro (HUMALOG) injection vial 0-18 Units, TID WC  insulin lispro (HUMALOG) injection vial 0-9 Units, Nightly  sodium chloride flush 0.9 % injection 10 mL, 2 times per day  sodium chloride flush 0.9 % injection 10 mL, PRN  acetaminophen (TYLENOL) tablet 650 mg, Q6H PRN    Or  acetaminophen (TYLENOL) suppository 650 mg, Q6H PRN  polyethylene glycol (GLYCOLAX) packet 17 g, Daily PRN  promethazine (PHENERGAN) tablet 12.5 mg, Q6H PRN    Or  ondansetron (ZOFRAN) injection 4 mg, Q6H PRN  ARIPiprazole (ABILIFY) tablet 2.5 mg, Daily  calcium-vitamin D 500-200 MG-UNIT per tablet 1 tablet, BID  vitamin D (CHOLECALCIFEROL) tablet 4,000 Units, Daily  cefUROXime (CEFTIN) tablet 500 mg, BID  citalopram (CELEXA) tablet 20 mg, Daily  cloNIDine (CATAPRES) tablet 0.1 mg, TID  dilTIAZem (CARDIZEM CD) extended release capsule 240 mg, Daily  doxepin (SINEQUAN) capsule 25 mg, Nightly  fenofibrate (TRICOR) tablet 54 mg, Nightly  insulin glargine (LANTUS) injection vial 69 Units, QAM  triamcinolone (KENALOG) 0.1 % cream, BID  glucose (GLUTOSE) 40 % oral gel 15 g, PRN  dextrose 50 % IV solution, PRN  glucagon (rDNA) injection 1 mg, PRN  dextrose 5 % solution, PRN  0.9 % sodium chloride bolus, Once        Review of Systems:   Unremarkable    Physical exam:   BP (!) 156/74   Pulse 72   Temp 98.2 °F (36.8 °C) (Oral)   Resp 18   Ht 5' 10.5\" (1.791 m)   Wt 270 lb (122.5 kg)   SpO2 98%   BMI 38.19 kg/m²   Obese white gentleman who appears older than his stated age, in no apparent distress  NC/AT EOMI sclera and conjunctiva are clear and anicteric mucous membranes are moist  Neck soft supple trachea midline no carotid bruit no JVD  CTA bilateral  Regular rhythm normal S1 and S2, no murmur, no rub  Abdomen soft nontender nondistended normal active bowel sounds  Distal extremities reveal skin change consistent with chronic venous stasis, mild chronic-type edema, though no pitting edema.   Right lower extremity ends in a BKA  Pulses 1+x4  Cranial nerves II through XII grossly intact  Awake alert appropriate, interactive  Besides skin changes in distal lower extremities as noted above, no rash or lesion on visible portions of integument    Data:   Labs:  CBC with Differential:    Lab Results   Component Value Date    WBC 2.8 09/09/2020    RBC 2.51 09/09/2020    HGB 7.7 09/09/2020    HCT 24.5 09/09/2020    PLT 97 09/09/2020    MCV 97.6 09/09/2020    MCH 30.7 09/09/2020    MCHC 31.4 09/09/2020    RDW 14.0 09/09/2020    LYMPHOPCT 14.8 09/09/2020    MONOPCT 8.7 09/09/2020    BASOPCT 0.4 09/09/2020    MONOSABS 0.24 09/09/2020    LYMPHSABS 0.41 09/09/2020    EOSABS 0.08 09/09/2020    BASOSABS 0.01 09/09/2020     CMP:    Lab Results   Component Value Date     09/09/2020    K 4.2 09/09/2020    K 4.4 09/03/2020    CL 99 09/09/2020    CO2 31 09/09/2020    BUN 48 09/09/2020    CREATININE 4.8 09/09/2020    GFRAA 15 09/09/2020    LABGLOM 12 09/09/2020    GLUCOSE 61 09/09/2020    PROT 6.0 09/08/2020    LABALBU 3.0 09/08/2020    CALCIUM 9.1 09/09/2020    BILITOT 0.2 09/08/2020    ALKPHOS 44 09/08/2020    AST 12 09/08/2020    ALT 9 09/08/2020     Ionized Calcium:  No results found for: IONCA  Magnesium: Lab Results   Component Value Date    MG 2.1 09/09/2020         Imaging:  XR CHEST PORTABLE   Final Result   CHF. Right pleural effusion with adjacent atelectasis and/or   infiltrate. Tiny left pleural effusion. US ABDOMEN COMPLETE   Final Result   1. Hepatosplenomegaly. 2. Findings suggestive of chronic renal disease. 3. Right pleural effusion. Assessment  1. Acute kidney injury, presumptively hemodynamically mediated due to severe anemia, the differential diagnosis at this point remains broad. 2. Chronic kidney disease. Last known creatinine was 2.6 mg/dL, which would be consistent with estimated GFR of 25 mL/min, and CKD stage IV, though at this time no other creatinine values are known. Presumptive etiology is diabetic nephropathy, and renal microvascular atherosclerotic disease/hypertensive nephrosclerosis in the setting of known coronary and peripheral arterial disease. .  3. Anemia, likely least part secondary to CKD, and GI bleed, though this would not explain the pancytopenia. Evaluation per hematology is underway. EGD demonstrates duodenitis and gastritis though no site of active bleed. Colonoscopy to follow. Iron stores are within normal limits.   4. Hypertension, likely essential.      -Azotemia progressing, likely decreased effective circulating volume due to diuresis      Stop Bumex drip  Continue Retacrit  Follow labs, UO   Avoid nephrotoxins      Electronically signed by Stephania Sandoval MD on 9/9/2020

## 2020-09-09 NOTE — PLAN OF CARE
Problem: Falls - Risk of:  Goal: Will remain free from falls  Description: Will remain free from falls  9/9/2020 0112 by David Hodgson RN  Outcome: Met This Shift  9/8/2020 1739 by Radha Stephenson RN  Outcome: Met This Shift  Goal: Absence of physical injury  Description: Absence of physical injury  9/9/2020 0112 by David Hodgson RN  Outcome: Met This Shift  9/8/2020 1739 by Radha Stephenson RN  Outcome: Met This Shift     Problem: Pain:  Goal: Pain level will decrease  Description: Pain level will decrease  Outcome: Met This Shift  Goal: Control of acute pain  Description: Control of acute pain  Outcome: Met This Shift

## 2020-09-10 LAB
ANION GAP SERPL CALCULATED.3IONS-SCNC: 8 MMOL/L (ref 7–16)
BUN BLDV-MCNC: 47 MG/DL (ref 8–23)
CALCIUM SERPL-MCNC: 7.9 MG/DL (ref 8.6–10.2)
CHLORIDE BLD-SCNC: 100 MMOL/L (ref 98–107)
CO2: 27 MMOL/L (ref 22–29)
CREAT SERPL-MCNC: 4.2 MG/DL (ref 0.7–1.2)
GFR AFRICAN AMERICAN: 17
GFR NON-AFRICAN AMERICAN: 14 ML/MIN/1.73
GLUCOSE BLD-MCNC: 89 MG/DL (ref 74–99)
MAGNESIUM: 1.8 MG/DL (ref 1.6–2.6)
METER GLUCOSE: 217 MG/DL (ref 74–99)
METER GLUCOSE: 257 MG/DL (ref 74–99)
METER GLUCOSE: 87 MG/DL (ref 74–99)
METER GLUCOSE: 88 MG/DL (ref 74–99)
METER GLUCOSE: 93 MG/DL (ref 74–99)
PHOSPHORUS: 4.9 MG/DL (ref 2.5–4.5)
POTASSIUM SERPL-SCNC: 3.9 MMOL/L (ref 3.5–5)
SODIUM BLD-SCNC: 135 MMOL/L (ref 132–146)

## 2020-09-10 PROCEDURE — 2580000003 HC RX 258: Performed by: SURGERY

## 2020-09-10 PROCEDURE — 83735 ASSAY OF MAGNESIUM: CPT

## 2020-09-10 PROCEDURE — 1200000000 HC SEMI PRIVATE

## 2020-09-10 PROCEDURE — 82962 GLUCOSE BLOOD TEST: CPT

## 2020-09-10 PROCEDURE — 80048 BASIC METABOLIC PNL TOTAL CA: CPT

## 2020-09-10 PROCEDURE — 6370000000 HC RX 637 (ALT 250 FOR IP): Performed by: SURGERY

## 2020-09-10 PROCEDURE — 84100 ASSAY OF PHOSPHORUS: CPT

## 2020-09-10 PROCEDURE — 6370000000 HC RX 637 (ALT 250 FOR IP): Performed by: INTERNAL MEDICINE

## 2020-09-10 PROCEDURE — 2700000000 HC OXYGEN THERAPY PER DAY

## 2020-09-10 PROCEDURE — 36415 COLL VENOUS BLD VENIPUNCTURE: CPT

## 2020-09-10 RX ADMIN — DILTIAZEM HYDROCHLORIDE 240 MG: 240 CAPSULE, COATED, EXTENDED RELEASE ORAL at 09:23

## 2020-09-10 RX ADMIN — PANTOPRAZOLE SODIUM 40 MG: 40 TABLET, DELAYED RELEASE ORAL at 07:12

## 2020-09-10 RX ADMIN — INSULIN GLARGINE 69 UNITS: 100 INJECTION, SOLUTION SUBCUTANEOUS at 09:25

## 2020-09-10 RX ADMIN — TRIAMCINOLONE ACETONIDE: 1 CREAM TOPICAL at 20:10

## 2020-09-10 RX ADMIN — INSULIN LISPRO 9 UNITS: 100 INJECTION, SOLUTION INTRAVENOUS; SUBCUTANEOUS at 12:55

## 2020-09-10 RX ADMIN — OYSTER SHELL CALCIUM WITH VITAMIN D 1 TABLET: 500; 200 TABLET, FILM COATED ORAL at 09:23

## 2020-09-10 RX ADMIN — FENOFIBRATE 54 MG: 54 TABLET ORAL at 20:13

## 2020-09-10 RX ADMIN — INSULIN LISPRO 3 UNITS: 100 INJECTION, SOLUTION INTRAVENOUS; SUBCUTANEOUS at 20:19

## 2020-09-10 RX ADMIN — CITALOPRAM HYDROBROMIDE 20 MG: 20 TABLET ORAL at 09:24

## 2020-09-10 RX ADMIN — SODIUM CHLORIDE, PRESERVATIVE FREE 10 ML: 5 INJECTION INTRAVENOUS at 20:16

## 2020-09-10 RX ADMIN — DOXEPIN HYDROCHLORIDE 25 MG: 25 CAPSULE ORAL at 20:13

## 2020-09-10 RX ADMIN — CHOLECALCIFEROL TAB 50 MCG (2000 UNIT) 4000 UNITS: 50 TAB at 09:23

## 2020-09-10 RX ADMIN — CLONIDINE HYDROCHLORIDE 0.1 MG: 0.1 TABLET ORAL at 20:12

## 2020-09-10 RX ADMIN — ARIPIPRAZOLE 2.5 MG: 5 TABLET ORAL at 09:23

## 2020-09-10 RX ADMIN — CEFUROXIME AXETIL 500 MG: 500 TABLET ORAL at 09:23

## 2020-09-10 RX ADMIN — CLONIDINE HYDROCHLORIDE 0.1 MG: 0.1 TABLET ORAL at 14:25

## 2020-09-10 RX ADMIN — CEFUROXIME AXETIL 500 MG: 500 TABLET ORAL at 20:12

## 2020-09-10 RX ADMIN — CLONIDINE HYDROCHLORIDE 0.1 MG: 0.1 TABLET ORAL at 09:23

## 2020-09-10 RX ADMIN — SODIUM CHLORIDE, PRESERVATIVE FREE 10 ML: 5 INJECTION INTRAVENOUS at 09:24

## 2020-09-10 RX ADMIN — OYSTER SHELL CALCIUM WITH VITAMIN D 1 TABLET: 500; 200 TABLET, FILM COATED ORAL at 20:13

## 2020-09-10 ASSESSMENT — PAIN SCALES - GENERAL
PAINLEVEL_OUTOF10: 0

## 2020-09-10 NOTE — PROGRESS NOTES
Subjective: The patient is awake and alert. Feels much better than presentation. Status post EGD (9/4/2020). No problems overnight. Denies chest pain, angina, and dyspnea. Denies abdominal pain. Tolerating diet. No nausea or vomiting. Wants to be discharged. Objective:    BP (!) 157/81   Pulse 62   Temp 97.9 °F (36.6 °C) (Oral)   Resp 18   Ht 5' 10.5\" (1.791 m)   Wt 270 lb (122.5 kg)   SpO2 95%   BMI 38.19 kg/m²     Current medications that patient is taking have been reviewed. Heart:  RRR, no murmurs, gallops, or rubs.   Lungs:  CTA bilaterally, no wheeze, rales or rhonchi  Abd: bowel sounds present, soft, nontender, nondistended, no masses  Extrem:  Lower extremity edema    CBC with Differential:    Lab Results   Component Value Date    WBC 2.8 09/09/2020    RBC 2.51 09/09/2020    HGB 7.7 09/09/2020    HCT 24.5 09/09/2020    PLT 97 09/09/2020    MCV 97.6 09/09/2020    MCH 30.7 09/09/2020    MCHC 31.4 09/09/2020    RDW 14.0 09/09/2020    LYMPHOPCT 14.8 09/09/2020    MONOPCT 8.7 09/09/2020    BASOPCT 0.4 09/09/2020    MONOSABS 0.24 09/09/2020    LYMPHSABS 0.41 09/09/2020    EOSABS 0.08 09/09/2020    BASOSABS 0.01 09/09/2020     CMP:    Lab Results   Component Value Date     09/10/2020    K 3.9 09/10/2020    K 4.4 09/03/2020     09/10/2020    CO2 27 09/10/2020    BUN 47 09/10/2020    CREATININE 4.2 09/10/2020    GFRAA 17 09/10/2020    LABGLOM 14 09/10/2020    GLUCOSE 89 09/10/2020    PROT 6.0 09/08/2020    LABALBU 3.0 09/08/2020    CALCIUM 7.9 09/10/2020    BILITOT 0.2 09/08/2020    ALKPHOS 44 09/08/2020    AST 12 09/08/2020    ALT 9 09/08/2020     BMP:    Lab Results   Component Value Date     09/10/2020    K 3.9 09/10/2020    K 4.4 09/03/2020     09/10/2020    CO2 27 09/10/2020    BUN 47 09/10/2020    LABALBU 3.0 09/08/2020    CREATININE 4.2 09/10/2020    CALCIUM 7.9 09/10/2020    GFRAA 17 09/10/2020    LABGLOM 14 09/10/2020    GLUCOSE 89 09/10/2020     Magnesium: Lab Results   Component Value Date    MG 1.8 09/10/2020     Phosphorus:    Lab Results   Component Value Date    PHOS 4.9 09/10/2020     PT/INR:  No results found for: PROTIME, INR  PTT:  No results found for: APTT, PTT[APTT}     Assessment:    Patient Active Problem List   Diagnosis    GI bleed    Acute blood loss anemia    Essential hypertension    [facility]    Tobacco abuse    Depression    Psychiatric disorder    Diabetes mellitus type 2, uncontrolled [facility]    Hyperlipidemia LDL goal <100    CKD (chronic kidney disease) stage 4, GFR 15-29 ml/min ([facility])    Acute kidney injury ([facility] [address]       Plan:    Status post EGD. No active bleeding seen. General surgery on board. Transfused. Hgb better. Hem/onc on board. Blood pressure ok, continue current medications  Continue breathing treatments and supplemental oxygen. Discussed cessation. Continue psychiatric medications. As above. Blood glucose ok, continue to adjust basal/bolus insulin therapy  Continue aggressive lipid therapy  Cr at 4.2 today. Nephrology on board. Off bumex drip. As above. Pt/Ot evaluations for discharge planning. Discharge soon.     Chaka Turner    11:09 AM  9/10/2020

## 2020-09-10 NOTE — CARE COORDINATION
Social Work / Discharge Planning : SW spoke to [facility] at [facility] 835 to update patient to be discharged tomorrow.  also left with HERNAN Patricia . ESTEPHANIA to follow.  Electronically signed by JS Cabrera on 9/10/20 at 3:39 PM EDT

## 2020-09-10 NOTE — CARE COORDINATION
IV bumex gtt stopped by renal.hgb 7.7. left vm messages for surgery/ hemonc, re; remaining medical plan. Prompting for possible discharge back to [address] [place] [address]. Await call back. Abad Olivares. Contacted PCP; renal; hemonc, surgery consultants. All ok with discharge. Renal would like to monitor BUN/CR 1 more day to verify improvement in renal function,. If so. Then can discharge to [place] [id number] in am. Abad Olivares.

## 2020-09-10 NOTE — PROGRESS NOTES
[facility] MD Joshua Oliver, MD Ro Sauceda, MD Vinnie White, MD Bettie Calix, AMY Adair, ANP  Consultation  Patient's Name: Mariam Kemp  1:20 PM  9/10/2020    Subjective  9/7:  In NAD . UO.  bumex iv   9/8: Feeling little bit better. Good appetite. Denies dyspnea at rest on room air. No chest pain or palpitations. Some low back pain. Still swollen, though better than on Friday. 9/9: Resting comfortably. No new complaint. Swelling improving. No dyspnea at rest on room air supine  9/10: No new complaint. In good spirits. Feeling better. Continued swelling, though improved since yesterday. Allergies:  Patient has no known allergies.     Current Medications:    epoetin juliane-epbx (RETACRIT) injection 8,000 Units, Once per day on Mon Wed Fri  pantoprazole (PROTONIX) tablet 40 mg, QAM AC  insulin lispro (HUMALOG) injection vial 0-18 Units, TID WC  insulin lispro (HUMALOG) injection vial 0-9 Units, Nightly  sodium chloride flush 0.9 % injection 10 mL, 2 times per day  sodium chloride flush 0.9 % injection 10 mL, PRN  acetaminophen (TYLENOL) tablet 650 mg, Q6H PRN    Or  acetaminophen (TYLENOL) suppository 650 mg, Q6H PRN  polyethylene glycol (GLYCOLAX) packet 17 g, Daily PRN  promethazine (PHENERGAN) tablet 12.5 mg, Q6H PRN    Or  ondansetron (ZOFRAN) injection 4 mg, Q6H PRN  ARIPiprazole (ABILIFY) tablet 2.5 mg, Daily  calcium-vitamin D 500-200 MG-UNIT per tablet 1 tablet, BID  vitamin D (CHOLECALCIFEROL) tablet 4,000 Units, Daily  cefUROXime (CEFTIN) tablet 500 mg, BID  citalopram (CELEXA) tablet 20 mg, Daily  cloNIDine (CATAPRES) tablet 0.1 mg, TID  dilTIAZem (CARDIZEM CD) extended release capsule 240 mg, Daily  doxepin (SINEQUAN) capsule 25 mg, Nightly  fenofibrate (TRICOR) tablet 54 mg, Nightly  insulin glargine (LANTUS) injection vial 69 Units, QAM  triamcinolone (KENALOG) 0.1 % cream, BID  glucose (GLUTOSE) 40 % oral gel 15 g, 09/08/2020    AST 12 09/08/2020    ALT 9 09/08/2020     Ionized Calcium:  No results found for: IONCA  Magnesium:    Lab Results   Component Value Date    MG 1.8 09/10/2020         Imaging:  XR CHEST PORTABLE   Final Result   CHF. Right pleural effusion with adjacent atelectasis and/or   infiltrate. Tiny left pleural effusion. US ABDOMEN COMPLETE   Final Result   1. Hepatosplenomegaly. 2. Findings suggestive of chronic renal disease. 3. Right pleural effusion. Assessment  1. Acute kidney injury, presumptively hemodynamically mediated due to severe anemia, the differential diagnosis at this point remains broad. 2. Chronic kidney disease. Last known creatinine was 2.6 mg/dL, which would be consistent with estimated GFR of 25 mL/min, and CKD stage IV, though at this time no other creatinine values are known. Presumptive etiology is diabetic nephropathy, and renal microvascular atherosclerotic disease/hypertensive nephrosclerosis in the setting of known coronary and peripheral arterial disease. .  3. Anemia, likely least part secondary to CKD, and GI bleed, though this would not explain the pancytopenia. Evaluation per hematology is underway. EGD demonstrates duodenitis and gastritis though no site of active bleed. Colonoscopy to follow. Iron stores are within normal limits.   4. Hypertension, likely essential.      -Azotemia progressed, though improved with cessation of the Bumex drip      Continue to hold Bumex drip  Depending on how laboratory studies look tomorrow, consider resumption of diuresis, with oral torsemide  If azotemia improving tomorrow, may be okay for discharge  Continue Retacrit  Follow labs, UO   Avoid nephrotoxins      Electronically signed by Elpidio Price MD on 9/10/2020

## 2020-09-10 NOTE — PLAN OF CARE
Problem: Falls - Risk of:  Goal: Will remain free from falls  Description: Will remain free from falls  9/10/2020 0146 by Cleopatra Bhardwaj RN  Outcome: Met This Shift  9/9/2020 1728 by Ricardo Whitfield RN  Outcome: Met This Shift     Problem: Falls - Risk of:  Goal: Will remain free from falls  Description: Will remain free from falls  9/10/2020 0146 by Cleopatra Bhardwaj RN  Outcome: Met This Shift

## 2020-09-10 NOTE — PROGRESS NOTES
Nutrition Assessment     Type and Reason for Visit: RD Nutrition Re-Screen/LOS    Nutrition Recommendations/Plan: Continue current diet    Nutrition Assessment:  Pt w/ stable nutritional status, noted good PO intakes per doc flow. Will monitor per policy.     Current Nutrition Therapies:    DIET CARB CONTROL; Carb Control: 4 carbs/meal (approximate 1800 kcals/day)    Anthropometric Measures:  · Height: 5' 10.5\" (179.1 cm)    Electronically signed by Tadeo Cruz MS, RD, LD on 9/10/20 at 11:59 AM EDT    Contact: [phone]

## 2020-09-11 VITALS
RESPIRATION RATE: 16 BRPM | DIASTOLIC BLOOD PRESSURE: 61 MMHG | HEIGHT: 71 IN | BODY MASS INDEX: 38.44 KG/M2 | OXYGEN SATURATION: 99 % | SYSTOLIC BLOOD PRESSURE: 142 MMHG | HEART RATE: 69 BPM | WEIGHT: 274.6 LBS | TEMPERATURE: 98.1 F

## 2020-09-11 LAB
ANION GAP SERPL CALCULATED.3IONS-SCNC: 9 MMOL/L (ref 7–16)
BUN BLDV-MCNC: 50 MG/DL (ref 8–23)
CALCIUM SERPL-MCNC: 8.7 MG/DL (ref 8.6–10.2)
CHLORIDE BLD-SCNC: 96 MMOL/L (ref 98–107)
CO2: 28 MMOL/L (ref 22–29)
CREAT SERPL-MCNC: 4.5 MG/DL (ref 0.7–1.2)
GFR AFRICAN AMERICAN: 16
GFR NON-AFRICAN AMERICAN: 13 ML/MIN/1.73
GLUCOSE BLD-MCNC: 50 MG/DL (ref 74–99)
HCT VFR BLD CALC: 24.6 % (ref 37–54)
HEMOGLOBIN: 7.7 G/DL (ref 12.5–16.5)
MAGNESIUM: 2.1 MG/DL (ref 1.6–2.6)
MCH RBC QN AUTO: 30.3 PG (ref 26–35)
MCHC RBC AUTO-ENTMCNC: 31.3 % (ref 32–34.5)
MCV RBC AUTO: 96.9 FL (ref 80–99.9)
METER GLUCOSE: 103 MG/DL (ref 74–99)
METER GLUCOSE: 108 MG/DL (ref 74–99)
METER GLUCOSE: 122 MG/DL (ref 74–99)
METER GLUCOSE: 187 MG/DL (ref 74–99)
METER GLUCOSE: 53 MG/DL (ref 74–99)
METER GLUCOSE: 58 MG/DL (ref 74–99)
METER GLUCOSE: 75 MG/DL (ref 74–99)
PDW BLD-RTO: 13.8 FL (ref 11.5–15)
PHOSPHORUS: 5 MG/DL (ref 2.5–4.5)
PLATELET # BLD: 107 E9/L (ref 130–450)
PMV BLD AUTO: 10.8 FL (ref 7–12)
POTASSIUM SERPL-SCNC: 4.3 MMOL/L (ref 3.5–5)
RBC # BLD: 2.54 E12/L (ref 3.8–5.8)
SODIUM BLD-SCNC: 133 MMOL/L (ref 132–146)
WBC # BLD: 2.3 E9/L (ref 4.5–11.5)

## 2020-09-11 PROCEDURE — 80048 BASIC METABOLIC PNL TOTAL CA: CPT

## 2020-09-11 PROCEDURE — 6370000000 HC RX 637 (ALT 250 FOR IP): Performed by: INTERNAL MEDICINE

## 2020-09-11 PROCEDURE — 36415 COLL VENOUS BLD VENIPUNCTURE: CPT

## 2020-09-11 PROCEDURE — 83735 ASSAY OF MAGNESIUM: CPT

## 2020-09-11 PROCEDURE — 85027 COMPLETE CBC AUTOMATED: CPT

## 2020-09-11 PROCEDURE — 2580000003 HC RX 258: Performed by: SURGERY

## 2020-09-11 PROCEDURE — 2700000000 HC OXYGEN THERAPY PER DAY

## 2020-09-11 PROCEDURE — 82962 GLUCOSE BLOOD TEST: CPT

## 2020-09-11 PROCEDURE — 6360000002 HC RX W HCPCS: Performed by: INTERNAL MEDICINE

## 2020-09-11 PROCEDURE — 6370000000 HC RX 637 (ALT 250 FOR IP): Performed by: SURGERY

## 2020-09-11 PROCEDURE — 84100 ASSAY OF PHOSPHORUS: CPT

## 2020-09-11 RX ORDER — TORSEMIDE 20 MG/1
20 TABLET ORAL DAILY
Status: DISCONTINUED | OUTPATIENT
Start: 2020-09-11 | End: 2020-09-11 | Stop reason: HOSPADM

## 2020-09-11 RX ADMIN — DILTIAZEM HYDROCHLORIDE 240 MG: 240 CAPSULE, COATED, EXTENDED RELEASE ORAL at 09:56

## 2020-09-11 RX ADMIN — CLONIDINE HYDROCHLORIDE 0.1 MG: 0.1 TABLET ORAL at 13:04

## 2020-09-11 RX ADMIN — PANTOPRAZOLE SODIUM 40 MG: 40 TABLET, DELAYED RELEASE ORAL at 06:14

## 2020-09-11 RX ADMIN — ARIPIPRAZOLE 2.5 MG: 5 TABLET ORAL at 09:56

## 2020-09-11 RX ADMIN — DEXTROSE 15 G: 15 GEL ORAL at 06:02

## 2020-09-11 RX ADMIN — CEFUROXIME AXETIL 500 MG: 500 TABLET ORAL at 09:55

## 2020-09-11 RX ADMIN — CHOLECALCIFEROL TAB 50 MCG (2000 UNIT) 4000 UNITS: 50 TAB at 09:56

## 2020-09-11 RX ADMIN — SODIUM CHLORIDE, PRESERVATIVE FREE 10 ML: 5 INJECTION INTRAVENOUS at 09:59

## 2020-09-11 RX ADMIN — OYSTER SHELL CALCIUM WITH VITAMIN D 1 TABLET: 500; 200 TABLET, FILM COATED ORAL at 09:56

## 2020-09-11 RX ADMIN — INSULIN LISPRO 3 UNITS: 100 INJECTION, SOLUTION INTRAVENOUS; SUBCUTANEOUS at 13:05

## 2020-09-11 RX ADMIN — TRIAMCINOLONE ACETONIDE: 1 CREAM TOPICAL at 09:57

## 2020-09-11 RX ADMIN — EPOETIN ALFA-EPBX 8000 UNITS: 4000 INJECTION, SOLUTION INTRAVENOUS; SUBCUTANEOUS at 09:57

## 2020-09-11 RX ADMIN — CLONIDINE HYDROCHLORIDE 0.1 MG: 0.1 TABLET ORAL at 09:56

## 2020-09-11 RX ADMIN — CITALOPRAM HYDROBROMIDE 20 MG: 20 TABLET ORAL at 09:56

## 2020-09-11 RX ADMIN — INSULIN GLARGINE 69 UNITS: 100 INJECTION, SOLUTION SUBCUTANEOUS at 13:05

## 2020-09-11 ASSESSMENT — PAIN SCALES - GENERAL
PAINLEVEL_OUTOF10: 0

## 2020-09-11 NOTE — PLAN OF CARE
Problem: Falls - Risk of:  Goal: Will remain free from falls  Description: Will remain free from falls  Outcome: Met This Shift     Problem: Skin Integrity:  Goal: Will show no infection signs and symptoms  Description: Will show no infection signs and symptoms  Outcome: Met This Shift     Problem: Skin Integrity:  Goal: Absence of new skin breakdown  Description: Absence of new skin breakdown  Outcome: Met This Shift     Problem: Pain:  Goal: Pain level will decrease  Description: Pain level will decrease  Outcome: Met This Shift

## 2020-09-11 NOTE — PROGRESS NOTES
Subjective: The patient is awake and alert. Feels much better than presentation. Status post EGD (9/4/2020). No problems overnight. Denies chest pain, angina, and dyspnea. Denies abdominal pain. Tolerating diet. No nausea or vomiting. Wants to be discharged. Objective:    BP (!) 161/75   Pulse 65   Temp 98.1 °F (36.7 °C) (Oral)   Resp 16   Ht 5' 10.5\" (1.791 m)   Wt 274 lb 9.6 oz (124.6 kg)   SpO2 99%   BMI 38.84 kg/m²     Current medications that patient is taking have been reviewed. Heart:  RRR, no murmurs, gallops, or rubs.   Lungs:  CTA bilaterally, no wheeze, rales or rhonchi  Abd: bowel sounds present, soft, nontender, nondistended, no masses  Extrem:  Lower extremity edema    CBC with Differential:    Lab Results   Component Value Date    WBC 2.3 09/11/2020    RBC 2.54 09/11/2020    HGB 7.7 09/11/2020    HCT 24.6 09/11/2020     09/11/2020    MCV 96.9 09/11/2020    MCH 30.3 09/11/2020    MCHC 31.3 09/11/2020    RDW 13.8 09/11/2020    LYMPHOPCT 14.8 09/09/2020    MONOPCT 8.7 09/09/2020    BASOPCT 0.4 09/09/2020    MONOSABS 0.24 09/09/2020    LYMPHSABS 0.41 09/09/2020    EOSABS 0.08 09/09/2020    BASOSABS 0.01 09/09/2020     CMP:    Lab Results   Component Value Date     09/11/2020    K 4.3 09/11/2020    K 4.4 09/03/2020    CL 96 09/11/2020    CO2 28 09/11/2020    BUN 50 09/11/2020    CREATININE 4.5 09/11/2020    GFRAA 16 09/11/2020    LABGLOM 13 09/11/2020    GLUCOSE 50 09/11/2020    PROT 6.0 09/08/2020    LABALBU 3.0 09/08/2020    CALCIUM 8.7 09/11/2020    BILITOT 0.2 09/08/2020    ALKPHOS 44 09/08/2020    AST 12 09/08/2020    ALT 9 09/08/2020     BMP:    Lab Results   Component Value Date     09/11/2020    K 4.3 09/11/2020    K 4.4 09/03/2020    CL 96 09/11/2020    CO2 28 09/11/2020    BUN 50 09/11/2020    LABALBU 3.0 09/08/2020    CREATININE 4.5 09/11/2020    CALCIUM 8.7 09/11/2020    GFRAA 16 09/11/2020    LABGLOM 13 09/11/2020    GLUCOSE 50 09/11/2020 Magnesium:    Lab Results   Component Value Date    MG 2.1 09/11/2020     Phosphorus:    Lab Results   Component Value Date    PHOS 5.0 09/11/2020     PT/INR:  No results found for: PROTIME, INR  PTT:  No results found for: APTT, PTT[APTT}     Assessment:    Patient Active Problem List   Diagnosis    GI bleed    Acute blood loss anemia    Essential hypertension    [facility]    Tobacco abuse    Depression    Psychiatric disorder    Diabetes mellitus type 2, uncontrolled ([facility])    Hyperlipidemia LDL goal <100    CKD (chronic kidney disease) stage 4, GFR 15-29 ml/min ([facility])    Acute kidney injury ([facility] [address]       Plan:    Status post EGD. No active bleeding seen. General surgery on board. Transfused. Hgb better. Hem/onc on board. Blood pressure ok, continue current medications  Continue breathing treatments and supplemental oxygen. Discussed cessation. Continue psychiatric medications. As above. Blood glucose ok, continue to adjust basal/bolus insulin therapy  Continue aggressive lipid therapy  Cr at 4.2 today. Nephrology on board. Off bumex drip. As above. Pt/Ot evaluations for discharge planning. Discharge soon.     Pedro Serum    10:32 AM  9/11/2020

## 2020-09-11 NOTE — CARE COORDINATION
Social Work / Discharge Planning : Patient will be discharged to [facility] today at 4:00 pm via [facility] ambulance. Patient HERNAN Oliveira updated via  [phone] and  left with Vonnie Bello from Admission at [facility] [phone]. Patient has COVID negative 09/03 . SW to follow.  Electronically signed by JS Thomson on 9/11/20 at 9:14 AM EDT

## 2020-09-11 NOTE — DISCHARGE SUMMARY
mouth nightly      fenofibrate (TRICOR) 48 MG tablet Take 48 mg by mouth nightly      furosemide (LASIX) 20 MG tablet Take 20 mg by mouth 2 times daily Starts 9/8/20      insulin detemir (LEVEMIR) 100 UNIT/ML injection vial Inject 69 Units into the skin every morning      calcium-vitamin D (OSCAL-500) 500-200 MG-UNIT per tablet Take 1 tablet by mouth 2 times daily      triamcinolone (KENALOG) 0.1 % cream Apply topically 2 times daily Apply topically 2 times daily. legs      Cholecalciferol (HM VITAMIN D3) 100 MCG (4000 UT) CAPS Take 1 tablet by mouth daily         STOP taking these medications       cefUROXime (CEFTIN) 500 MG tablet Comments:   Reason for Stopping:         aspirin 81 MG EC tablet Comments:   Reason for Stopping:         glipiZIDE (GLUCOTROL) 5 MG tablet Comments:   Reason for Stopping:             Activity: activity as tolerated  Diet: low fat, low cholesterol diet    Follow up with dr Lisa Kent in 1 week. Follow up with dr Reno Horvath in 2-3 weeks. Follow up with dr Isiah Timmons in 2-3 weeks. Follow up with dr Jude Jose in 2-3 weeks.       Note that over 30 minutes was spent in preparing discharge papers, discussing discharge with patient, medication review, etc.    Signed:  Joana Marlow    9/11/2020  10:34 AM

## 2020-09-11 NOTE — PROGRESS NOTES
[facility] MD Anne Mclain, MD Ronnell Palacios, MD Jen Arreola, MD Mary Stephenson, CNP   Lloyd Luo, ANP  Consultation  Patient's Name: Sujata Miller  1:57 PM  9/11/2020    Subjective  9/7:  In NAD . UO.  bumex iv   9/8: Feeling little bit better. Good appetite. Denies dyspnea at rest on room air. No chest pain or palpitations. Some low back pain. Still swollen, though better than on Friday. 9/9: Resting comfortably. No new complaint. Swelling improving. No dyspnea at rest on room air supine  9/10: No new complaint. In good spirits. Feeling better. Continued swelling, though improved since yesterday. 9/11: Feeling better. No complaint. Good appetite. ROS otherwise unremarkable. Allergies:  Patient has no known allergies.     Current Medications:    epoetin juliane-epbx (RETACRIT) injection 8,000 Units, Once per day on Mon Wed Fri  pantoprazole (PROTONIX) tablet 40 mg, QAM AC  insulin lispro (HUMALOG) injection vial 0-18 Units, TID WC  insulin lispro (HUMALOG) injection vial 0-9 Units, Nightly  sodium chloride flush 0.9 % injection 10 mL, 2 times per day  sodium chloride flush 0.9 % injection 10 mL, PRN  acetaminophen (TYLENOL) tablet 650 mg, Q6H PRN    Or  acetaminophen (TYLENOL) suppository 650 mg, Q6H PRN  polyethylene glycol (GLYCOLAX) packet 17 g, Daily PRN  promethazine (PHENERGAN) tablet 12.5 mg, Q6H PRN    Or  ondansetron (ZOFRAN) injection 4 mg, Q6H PRN  ARIPiprazole (ABILIFY) tablet 2.5 mg, Daily  calcium-vitamin D 500-200 MG-UNIT per tablet 1 tablet, BID  vitamin D (CHOLECALCIFEROL) tablet 4,000 Units, Daily  cefUROXime (CEFTIN) tablet 500 mg, BID  citalopram (CELEXA) tablet 20 mg, Daily  cloNIDine (CATAPRES) tablet 0.1 mg, TID  dilTIAZem (CARDIZEM CD) extended release capsule 240 mg, Daily  doxepin (SINEQUAN) capsule 25 mg, Nightly  fenofibrate (TRICOR) tablet 54 mg, Nightly  insulin glargine (LANTUS) injection vial 69 Units, QAM  triamcinolone (KENALOG) 0.1 % cream, BID  glucose (GLUTOSE) 40 % oral gel 15 g, PRN  dextrose 50 % IV solution, PRN  glucagon (rDNA) injection 1 mg, PRN  dextrose 5 % solution, PRN  0.9 % sodium chloride bolus, Once        Review of Systems:   Unremarkable    Physical exam:   BP (!) 142/61   Pulse 69   Temp 98.1 °F (36.7 °C) (Oral)   Resp 16   Ht 5' 10.5\" (1.791 m)   Wt 274 lb 9.6 oz (124.6 kg)   SpO2 99%   BMI 38.84 kg/m²   Obese white gentleman who appears older than his stated age, in no apparent distress  NC/AT EOMI sclera and conjunctiva are clear and anicteric mucous membranes are moist  Neck soft supple trachea midline no carotid bruit no JVD  CTA bilateral  Regular rhythm normal S1 and S2, no murmur, no rub  Abdomen soft nontender nondistended normal active bowel sounds  Distal extremities reveal skin change consistent with chronic venous stasis, mild chronic-type edema, though no pitting edema.   Right lower extremity ends in a BKA  Pulses 1+x4  Cranial nerves II through XII grossly intact  Awake alert appropriate, interactive  Besides skin changes in distal lower extremities as noted above, no rash or lesion on visible portions of integument    Data:   Labs:  CBC with Differential:    Lab Results   Component Value Date    WBC 2.3 09/11/2020    RBC 2.54 09/11/2020    HGB 7.7 09/11/2020    HCT 24.6 09/11/2020     09/11/2020    MCV 96.9 09/11/2020    MCH 30.3 09/11/2020    MCHC 31.3 09/11/2020    RDW 13.8 09/11/2020    LYMPHOPCT 14.8 09/09/2020    MONOPCT 8.7 09/09/2020    BASOPCT 0.4 09/09/2020    MONOSABS 0.24 09/09/2020    LYMPHSABS 0.41 09/09/2020    EOSABS 0.08 09/09/2020    BASOSABS 0.01 09/09/2020     CMP:    Lab Results   Component Value Date     09/11/2020    K 4.3 09/11/2020    K 4.4 09/03/2020    CL 96 09/11/2020    CO2 28 09/11/2020    BUN 50 09/11/2020    CREATININE 4.5 09/11/2020    GFRAA 16 09/11/2020    LABGLOM 13 09/11/2020    GLUCOSE 50 09/11/2020    PROT 6.0 09/08/2020    LABALBU 3.0 09/08/2020    CALCIUM 8.7 09/11/2020    BILITOT 0.2 09/08/2020    ALKPHOS 44 09/08/2020    AST 12 09/08/2020    ALT 9 09/08/2020     Ionized Calcium:  No results found for: IONCA  Magnesium:    Lab Results   Component Value Date    MG 2.1 09/11/2020         Imaging:  XR CHEST PORTABLE   Final Result   CHF. Right pleural effusion with adjacent atelectasis and/or   infiltrate. Tiny left pleural effusion. US ABDOMEN COMPLETE   Final Result   1. Hepatosplenomegaly. 2. Findings suggestive of chronic renal disease. 3. Right pleural effusion. Assessment  1. Acute kidney injury, presumptively hemodynamically mediated due to severe anemia, the differential diagnosis at this point remains broad. 2. Chronic kidney disease. Last known creatinine was 2.6 mg/dL, which would be consistent with estimated GFR of 25 mL/min, and CKD stage IV, though at this time no other creatinine values are known. Presumptive etiology is diabetic nephropathy, and renal microvascular atherosclerotic disease/hypertensive nephrosclerosis in the setting of known coronary and peripheral arterial disease. .  3. Anemia, likely least part secondary to CKD, and GI bleed, though this would not explain the pancytopenia. Evaluation per hematology is underway. EGD demonstrates duodenitis and gastritis though no site of active bleed. Colonoscopy to follow. Iron stores are within normal limits. 4. Hypertension, likely essential.        okay for discharge  Continue Retacrit  Follow labs --on Monday, and Thursday next week  Follow-up with me in the office in 7 to 10 days  Add torsemide 20 mg daily. If inadequate increase to 40 mg.   Avoid nephrotoxins      Electronically signed by Emily Bañuelos MD on 9/11/2020

## 2020-11-17 ENCOUNTER — HOSPITAL ENCOUNTER (EMERGENCY)
Age: 60
Discharge: HOME | End: 2020-11-18
Attending: EMERGENCY MEDICINE
Payer: MEDICARE

## 2020-11-17 LAB
ABO/RH: NORMAL
ALBUMIN SERPL-MCNC: 3.3 G/DL (ref 3.5–5.2)
ALP BLD-CCNC: 41 U/L (ref 40–129)
ALT SERPL-CCNC: 10 U/L (ref 0–40)
ANION GAP SERPL CALCULATED.3IONS-SCNC: 8 MMOL/L (ref 7–16)
ANISOCYTOSIS: ABNORMAL
ANTIBODY SCREEN: NORMAL
AST SERPL-CCNC: 31 U/L (ref 0–39)
BASOPHILIC STIPPLING: ABNORMAL
BASOPHILS ABSOLUTE: 0 E9/L (ref 0–0.2)
BASOPHILS RELATIVE PERCENT: 0 % (ref 0–2)
BILIRUB SERPL-MCNC: 0.2 MG/DL (ref 0–1.2)
BLOOD BANK DISPENSE STATUS: NORMAL
BLOOD BANK PRODUCT CODE: NORMAL
BPU ID: NORMAL
BUN BLDV-MCNC: 63 MG/DL (ref 8–23)
CALCIUM SERPL-MCNC: 9.2 MG/DL (ref 8.6–10.2)
CHLORIDE BLD-SCNC: 96 MMOL/L (ref 98–107)
CO2: 36 MMOL/L (ref 22–29)
CREAT SERPL-MCNC: 6.6 MG/DL (ref 0.7–1.2)
DESCRIPTION BLOOD BANK: NORMAL
EOSINOPHILS ABSOLUTE: 0.07 E9/L (ref 0.05–0.5)
EOSINOPHILS RELATIVE PERCENT: 1.7 % (ref 0–6)
GFR AFRICAN AMERICAN: 10
GFR NON-AFRICAN AMERICAN: 9 ML/MIN/1.73
GLUCOSE BLD-MCNC: 30 MG/DL (ref 74–99)
HCT VFR BLD CALC: 22.8 % (ref 37–54)
HEMOGLOBIN: 6.9 G/DL (ref 12.5–16.5)
HYPOCHROMIA: ABNORMAL
LYMPHOCYTES ABSOLUTE: 0.27 E9/L (ref 1.5–4)
LYMPHOCYTES RELATIVE PERCENT: 7 % (ref 20–42)
MCH RBC QN AUTO: 30.4 PG (ref 26–35)
MCHC RBC AUTO-ENTMCNC: 30.3 % (ref 32–34.5)
MCV RBC AUTO: 100.4 FL (ref 80–99.9)
METER GLUCOSE: 103 MG/DL (ref 74–99)
METER GLUCOSE: <40 MG/DL (ref 74–99)
MONOCYTES ABSOLUTE: 0.2 E9/L (ref 0.1–0.95)
MONOCYTES RELATIVE PERCENT: 5.2 % (ref 2–12)
NEUTROPHILS ABSOLUTE: 3.35 E9/L (ref 1.8–7.3)
NEUTROPHILS RELATIVE PERCENT: 86.1 % (ref 43–80)
NUCLEATED RED BLOOD CELLS: 0 /100 WBC
PDW BLD-RTO: 16.1 FL (ref 11.5–15)
PLATELET # BLD: 132 E9/L (ref 130–450)
PMV BLD AUTO: 9.5 FL (ref 7–12)
POLYCHROMASIA: ABNORMAL
POTASSIUM SERPL-SCNC: 4.5 MMOL/L (ref 3.5–5)
RBC # BLD: 2.27 E12/L (ref 3.8–5.8)
SODIUM BLD-SCNC: 140 MMOL/L (ref 132–146)
TOTAL PROTEIN: 7 G/DL (ref 6.4–8.3)
WBC # BLD: 3.9 E9/L (ref 4.5–11.5)

## 2020-11-17 PROCEDURE — 86900 BLOOD TYPING SEROLOGIC ABO: CPT

## 2020-11-17 PROCEDURE — 86923 COMPATIBILITY TEST ELECTRIC: CPT

## 2020-11-17 PROCEDURE — 99285 EMERGENCY DEPT VISIT HI MDM: CPT | Performed by: EMERGENCY MEDICINE

## 2020-11-17 PROCEDURE — 82962 GLUCOSE BLOOD TEST: CPT

## 2020-11-17 PROCEDURE — 85025 COMPLETE CBC W/AUTO DIFF WBC: CPT

## 2020-11-17 PROCEDURE — 2580000003 HC RX 258: Performed by: EMERGENCY MEDICINE

## 2020-11-17 PROCEDURE — 86850 RBC ANTIBODY SCREEN: CPT

## 2020-11-17 PROCEDURE — 86901 BLOOD TYPING SEROLOGIC RH(D): CPT

## 2020-11-17 PROCEDURE — 96374 THER/PROPH/DIAG INJ IV PUSH: CPT | Performed by: EMERGENCY MEDICINE

## 2020-11-17 PROCEDURE — 80053 COMPREHEN METABOLIC PANEL: CPT

## 2020-11-17 PROCEDURE — P9016 RBC LEUKOCYTES REDUCED: HCPCS

## 2020-11-17 PROCEDURE — 36430 TRANSFUSION BLD/BLD COMPNT: CPT

## 2020-11-17 RX ORDER — DEXTROSE MONOHYDRATE 25 G/50ML
25 INJECTION, SOLUTION INTRAVENOUS PRN
Status: DISCONTINUED | OUTPATIENT
Start: 2020-11-17 | End: 2020-11-18 | Stop reason: HOSPADM

## 2020-11-17 RX ORDER — 0.9 % SODIUM CHLORIDE 0.9 %
20 INTRAVENOUS SOLUTION INTRAVENOUS ONCE
Status: COMPLETED | OUTPATIENT
Start: 2020-11-17 | End: 2020-11-18

## 2020-11-17 RX ADMIN — SODIUM CHLORIDE 20 ML: 9 INJECTION, SOLUTION INTRAVENOUS at 21:12

## 2020-11-17 RX ADMIN — DEXTROSE MONOHYDRATE 25 G: 25 INJECTION, SOLUTION INTRAVENOUS at 19:52

## 2020-11-17 NOTE — ED NOTES
Bed: 17  Expected date:   Expected time:   Means of arrival:   Comments:  ems     Elidia Nielsen RN  11/17/20 [id number]

## 2020-11-18 VITALS
WEIGHT: 278 LBS | BODY MASS INDEX: 39.8 KG/M2 | RESPIRATION RATE: 18 BRPM | OXYGEN SATURATION: 94 % | DIASTOLIC BLOOD PRESSURE: 95 MMHG | SYSTOLIC BLOOD PRESSURE: 180 MMHG | HEIGHT: 70 IN | HEART RATE: 92 BPM | TEMPERATURE: 97 F

## 2020-11-18 LAB
CHP ED QC CHECK: YES
GLUCOSE BLD-MCNC: 110 MG/DL
HCT VFR BLD CALC: 25 % (ref 37–54)
HEMOGLOBIN: 7.5 G/DL (ref 12.5–16.5)
METER GLUCOSE: 102 MG/DL (ref 74–99)
METER GLUCOSE: 110 MG/DL (ref 74–99)

## 2020-11-18 PROCEDURE — 85014 HEMATOCRIT: CPT

## 2020-11-18 PROCEDURE — 36415 COLL VENOUS BLD VENIPUNCTURE: CPT

## 2020-11-18 PROCEDURE — 82962 GLUCOSE BLOOD TEST: CPT

## 2020-11-18 PROCEDURE — 85018 HEMOGLOBIN: CPT

## 2020-11-18 NOTE — ED PROVIDER NOTES
HPI:  11/18/20,   Time: 12:06 AM BARBIE Wilkes is a 56 y.o. male presenting to the ED for low blood count, beginning condition is chronic ago. The complaint has been persistent, moderate in severity, and worsened by nothing. Sent in from extended care facility for a hemoglobin of 5.9 at their facility. No hematemesis reported. No fever or chills reported. History is limited. History of COPD, CHF and diabetes. ROS:   Pertinent positives and negatives are stated within HPI, all other systems reviewed and are negative.  --------------------------------------------- PAST HISTORY ---------------------------------------------  Past Medical History:  has a past medical history of Anemia in chronic kidney disease, Arthropathy, unspecified, Below knee amputation ([address] Bifascicular block, Bipolar disorder, unspecified ([address] Cellulitis, CHF (congestive heart failure) ([address] Chronic kidney disease, stage 5 ([address] COPD (chronic obstructive pulmonary disease) ([address] Diabetes mellitus ([address] Fascicular block, Generalized muscle weakness, Hyperlipidemia, Hypertension, Intracranial injury without loss of consciousness ([address] Osteoporosis, Peripheral vascular disease ([address] Personality disorder ([address] Presence of intraocular lens, Right bundle branch block, Schizoaffective disorder ([address] Splenomegaly, Unspecified age-related cataract, Venous insufficiency (chronic) (peripheral), and Vitamin D deficiency. Past Surgical History:  has a past surgical history that includes Upper gastrointestinal endoscopy (N/A, 9/4/2020). Social History:  reports that he has been smoking cigarettes. He started smoking about 54 years ago. He has never used smokeless tobacco. He reports previous alcohol use. He reports that he does not use drugs. Family History: family history includes Breast Cancer in his mother; High Blood Pressure in his father. The patients home medications have been reviewed.     Allergies: Patient has no known allergies.     -------------------------------------------------- RESULTS -------------------------------------------------  All laboratory and radiology results have been personally reviewed by myself   LABS:  Results for orders placed or performed during the hospital encounter of 11/17/20   CBC Auto Differential   Result Value Ref Range    WBC 3.9 (L) 4.5 - 11.5 E9/L    RBC 2.27 (L) 3.80 - 5.80 E12/L    Hemoglobin 6.9 (LL) 12.5 - 16.5 g/dL    Hematocrit 22.8 (L) 37.0 - 54.0 %    .4 (H) 80.0 - 99.9 fL    MCH 30.4 26.0 - 35.0 pg    MCHC 30.3 (L) 32.0 - 34.5 %    RDW 16.1 (H) 11.5 - 15.0 fL    Platelets 525 189 - 710 E9/L    MPV 9.5 7.0 - 12.0 fL    Neutrophils % 86.1 (H) 43.0 - 80.0 %    Lymphocytes % 7.0 (L) 20.0 - 42.0 %    Monocytes % 5.2 2.0 - 12.0 %    Eosinophils % 1.7 0.0 - 6.0 %    Basophils % 0.0 0.0 - 2.0 %    Neutrophils Absolute 3.35 1.80 - 7.30 E9/L    Lymphocytes Absolute 0.27 (L) 1.50 - 4.00 E9/L    Monocytes Absolute 0.20 0.10 - 0.95 E9/L    Eosinophils Absolute 0.07 0.05 - 0.50 E9/L    Basophils Absolute 0.00 0.00 - 0.20 E9/L    nRBC 0.0 /100 WBC    Anisocytosis 1+     Polychromasia 1+     Hypochromia 1+     Basophilic Stippling 1+    Comprehensive Metabolic Panel   Result Value Ref Range    Sodium 140 132 - 146 mmol/L    Potassium 4.5 3.5 - 5.0 mmol/L    Chloride 96 (L) 98 - 107 mmol/L    CO2 36 (H) 22 - 29 mmol/L    Anion Gap 8 7 - 16 mmol/L    Glucose 30 (LL) 74 - 99 mg/dL    BUN 63 (H) 8 - 23 mg/dL    CREATININE 6.6 (H) 0.7 - 1.2 mg/dL    GFR Non-African American 9 >=60 mL/min/1.73    GFR African American 10     Calcium 9.2 8.6 - 10.2 mg/dL    Total Protein 7.0 6.4 - 8.3 g/dL    Alb 3.3 (L) 3.5 - 5.2 g/dL    Total Bilirubin 0.2 0.0 - 1.2 mg/dL    Alkaline Phosphatase 41 40 - 129 U/L    ALT 10 0 - 40 U/L    AST 31 0 - 39 U/L   Hemoglobin and hematocrit, blood   Result Value Ref Range    Hemoglobin 7.5 (L) 12.5 - 16.5 g/dL    Hematocrit 25.0 (L) 37.0 - 54.0 %   POCT Glucose   Result Value Ref Range    Meter Glucose <40 (L) 74 - 99 mg/dL   POCT Glucose   Result Value Ref Range    Meter Glucose 103 (H) 74 - 99 mg/dL   POCT Glucose   Result Value Ref Range    Glucose 110 mg/dL    QC OK? yes    POCT Glucose   Result Value Ref Range    Meter Glucose 110 (H) 74 - 99 mg/dL   POCT Glucose   Result Value Ref Range    Meter Glucose 102 (H) 74 - 99 mg/dL   TYPE AND SCREEN   Result Value Ref Range    ABO/Rh A POS     Antibody Screen NEG    PREPARE RBC (CROSSMATCH)   Result Value Ref Range    Product Code Blood Bank [id number]     Description Blood Bank Red Blood Cells, Leuko-reduced     Unit Number [id number]     Dispense Status Blood Bank transfused        RADIOLOGY:  Interpreted by Radiologist.  No orders to display       ------------------------- NURSING NOTES AND VITALS REVIEWED ---------------------------   The nursing notes within the ED encounter and vital signs as below have been reviewed. BP (!) 180/95   Pulse 92   Temp 97 °F (36.1 °C) (Axillary)   Resp 18   Ht 5' 10\" (1.778 m)   Wt 278 lb (126.1 kg)   SpO2 94%   BMI 39.89 kg/m²   Oxygen Saturation Interpretation: Normal      ---------------------------------------------------PHYSICAL EXAM--------------------------------------      Constitutional/General: Alert and oriented x3, well appearing, non toxic in NAD  Head: NC/AT  Eyes: PERRL, EOMI  Mouth: Oropharynx clear, handling secretions, no trismus  Neck: Supple, full ROM, no meningeal signs  Pulmonary: Lungs clear to auscultation bilaterally, no wheezes, rales, or rhonchi. Not in respiratory distress  Cardiovascular:  Regular rate and rhythm, no murmurs, gallops, or rubs. 2+ distal pulses  Abdomen: Soft, non tender, non distended,   Extremities: Moves all extremities x 4.  Warm and well perfused  Skin: warm and dry without rash  Neurologic: GCS 15,  Psych: Normal Affect      ------------------------------ ED COURSE/MEDICAL DECISION MAKING----------------------  Medications   0.9 % sodium chloride bolus (0 mLs Intravenous Stopped 11/18/20 0041)         Medical Decision Making:   Recheck labs hemoglobin returned 6.9 at this facility. Will type and cross and give 1 unit of packed red blood cells. Blood sugar one-point return 40 will give D50 will recheck blood sugar and give the patient some food to eat. Reexamination 12:05 AM patient is sleeping but easily awakens approximately more than half of the unit of blood is infused vital signs remained stable. Pulse ox is 99%  Counseling: The emergency provider has spoken with the patient and discussed todays results, in addition to providing specific details for the plan of care and counseling regarding the diagnosis and prognosis. Questions are answered at this time and they are agreeable with the plan.      --------------------------------- IMPRESSION AND DISPOSITION ---------------------------------    IMPRESSION  1.  Anemia, unspecified type        DISPOSITION  Disposition: Discharge to nursing home  Patient condition is fair                 Radha Webb MD  11/29/20 [address], MD  12/03/20 1156

## 2020-11-18 NOTE — ED NOTES
Assumed care of patient. Pt lying in bed in no apparent distress. No visitors at bedside.       Rigoberto Vick RN  11/17/20 1949

## 2020-12-04 ENCOUNTER — APPOINTMENT (OUTPATIENT)
Dept: CT IMAGING | Age: 60
DRG: 177 | End: 2020-12-04
Payer: MEDICARE

## 2020-12-04 ENCOUNTER — APPOINTMENT (OUTPATIENT)
Dept: GENERAL RADIOLOGY | Age: 60
DRG: 177 | End: 2020-12-04
Payer: MEDICARE

## 2020-12-04 ENCOUNTER — HOSPITAL ENCOUNTER (INPATIENT)
Age: 60
LOS: 3 days | Discharge: NURSING, REHAB OR LONG-TERM CARE | DRG: 177 | End: 2020-12-08
Attending: EMERGENCY MEDICINE | Admitting: INTERNAL MEDICINE
Payer: MEDICARE

## 2020-12-04 LAB
AADO2: 330.2 MMHG
ANION GAP SERPL CALCULATED.3IONS-SCNC: 11 MMOL/L (ref 7–16)
ATYPICAL LYMPHOCYTE RELATIVE PERCENT: 2 % (ref 0–4)
B.E.: 11.6 MMOL/L (ref -3–3)
BASOPHILS ABSOLUTE: 0 E9/L (ref 0–0.2)
BASOPHILS RELATIVE PERCENT: 0 % (ref 0–2)
BUN BLDV-MCNC: 90 MG/DL (ref 8–23)
CALCIUM SERPL-MCNC: 8.8 MG/DL (ref 8.6–10.2)
CHLORIDE BLD-SCNC: 88 MMOL/L (ref 98–107)
CO2: 37 MMOL/L (ref 22–29)
COHB: 1.5 % (ref 0–1.5)
CREAT SERPL-MCNC: 7.1 MG/DL (ref 0.7–1.2)
CRITICAL: ABNORMAL
DATE ANALYZED: ABNORMAL
DATE OF COLLECTION: ABNORMAL
EOSINOPHILS ABSOLUTE: 0 E9/L (ref 0.05–0.5)
EOSINOPHILS RELATIVE PERCENT: 0 % (ref 0–6)
FIO2: 80 %
GFR AFRICAN AMERICAN: 10
GFR NON-AFRICAN AMERICAN: 8 ML/MIN/1.73
GLUCOSE BLD-MCNC: 235 MG/DL (ref 74–99)
HCO3: 40 MMOL/L (ref 22–26)
HCT VFR BLD CALC: 26.4 % (ref 37–54)
HEMOGLOBIN: 8 G/DL (ref 12.5–16.5)
HHB: 1.8 % (ref 0–5)
HYPOCHROMIA: ABNORMAL
LAB: ABNORMAL
LACTIC ACID, SEPSIS: 1.4 MMOL/L (ref 0.5–1.9)
LYMPHOCYTES ABSOLUTE: 0.31 E9/L (ref 1.5–4)
LYMPHOCYTES RELATIVE PERCENT: 7 % (ref 20–42)
Lab: ABNORMAL
MCH RBC QN AUTO: 29.3 PG (ref 26–35)
MCHC RBC AUTO-ENTMCNC: 30.3 % (ref 32–34.5)
MCV RBC AUTO: 96.7 FL (ref 80–99.9)
METHB: 0.1 % (ref 0–1.5)
MODE: ABNORMAL
MONOCYTES ABSOLUTE: 0.27 E9/L (ref 0.1–0.95)
MONOCYTES RELATIVE PERCENT: 8 % (ref 2–12)
NEUTROPHILS ABSOLUTE: 2.82 E9/L (ref 1.8–7.3)
NEUTROPHILS RELATIVE PERCENT: 83 % (ref 43–80)
O2 CONTENT: 12 ML/DL
O2 SATURATION: 98.2 % (ref 92–98.5)
O2HB: 96.6 % (ref 94–97)
OPERATOR ID: 3342
PATIENT TEMP: 37 C
PCO2: 84 MMHG (ref 35–45)
PDW BLD-RTO: 15.9 FL (ref 11.5–15)
PEEP/CPAP: 6 CMH2O
PFO2: 1.65 MMHG/%
PH BLOOD GAS: 7.3 (ref 7.35–7.45)
PLATELET # BLD: 177 E9/L (ref 130–450)
PMV BLD AUTO: 10.2 FL (ref 7–12)
PO2: 132 MMHG (ref 75–100)
POLYCHROMASIA: ABNORMAL
POTASSIUM REFLEX MAGNESIUM: 3.8 MMOL/L (ref 3.5–5)
PRO-BNP: ABNORMAL PG/ML (ref 0–125)
PS: 16 CMH20
RBC # BLD: 2.73 E12/L (ref 3.8–5.8)
RI(T): 250 %
SODIUM BLD-SCNC: 136 MMOL/L (ref 132–146)
SOURCE, BLOOD GAS: ABNORMAL
STOMATOCYTES: ABNORMAL
THB: 8.6 G/DL (ref 11.5–16.5)
TIME ANALYZED: 2225
TROPONIN: 0.06 NG/ML (ref 0–0.03)
WBC # BLD: 3.4 E9/L (ref 4.5–11.5)

## 2020-12-04 PROCEDURE — 71045 X-RAY EXAM CHEST 1 VIEW: CPT

## 2020-12-04 PROCEDURE — 82805 BLOOD GASES W/O2 SATURATION: CPT

## 2020-12-04 PROCEDURE — 84484 ASSAY OF TROPONIN QUANT: CPT

## 2020-12-04 PROCEDURE — 83880 ASSAY OF NATRIURETIC PEPTIDE: CPT

## 2020-12-04 PROCEDURE — 83605 ASSAY OF LACTIC ACID: CPT

## 2020-12-04 PROCEDURE — 87040 BLOOD CULTURE FOR BACTERIA: CPT

## 2020-12-04 PROCEDURE — 80048 BASIC METABOLIC PNL TOTAL CA: CPT

## 2020-12-04 PROCEDURE — 6370000000 HC RX 637 (ALT 250 FOR IP): Performed by: STUDENT IN AN ORGANIZED HEALTH CARE EDUCATION/TRAINING PROGRAM

## 2020-12-04 PROCEDURE — 85025 COMPLETE CBC W/AUTO DIFF WBC: CPT

## 2020-12-04 PROCEDURE — 96375 TX/PRO/DX INJ NEW DRUG ADDON: CPT

## 2020-12-04 PROCEDURE — 93005 ELECTROCARDIOGRAM TRACING: CPT | Performed by: STUDENT IN AN ORGANIZED HEALTH CARE EDUCATION/TRAINING PROGRAM

## 2020-12-04 PROCEDURE — 99285 EMERGENCY DEPT VISIT HI MDM: CPT

## 2020-12-04 PROCEDURE — 94660 CPAP INITIATION&MGMT: CPT

## 2020-12-04 PROCEDURE — 96365 THER/PROPH/DIAG IV INF INIT: CPT

## 2020-12-04 PROCEDURE — 6360000002 HC RX W HCPCS: Performed by: STUDENT IN AN ORGANIZED HEALTH CARE EDUCATION/TRAINING PROGRAM

## 2020-12-04 RX ORDER — METHYLPREDNISOLONE SODIUM SUCCINATE 125 MG/2ML
125 INJECTION, POWDER, LYOPHILIZED, FOR SOLUTION INTRAMUSCULAR; INTRAVENOUS ONCE
Status: COMPLETED | OUTPATIENT
Start: 2020-12-04 | End: 2020-12-04

## 2020-12-04 RX ORDER — IPRATROPIUM BROMIDE AND ALBUTEROL SULFATE 2.5; .5 MG/3ML; MG/3ML
3 SOLUTION RESPIRATORY (INHALATION)
Status: DISCONTINUED | OUTPATIENT
Start: 2020-12-05 | End: 2020-12-05 | Stop reason: ALTCHOICE

## 2020-12-04 RX ORDER — IPRATROPIUM BROMIDE AND ALBUTEROL SULFATE 2.5; .5 MG/3ML; MG/3ML
1 SOLUTION RESPIRATORY (INHALATION)
Status: DISCONTINUED | OUTPATIENT
Start: 2020-12-04 | End: 2020-12-04

## 2020-12-04 RX ORDER — MAGNESIUM SULFATE IN WATER 40 MG/ML
2 INJECTION, SOLUTION INTRAVENOUS ONCE
Status: COMPLETED | OUTPATIENT
Start: 2020-12-04 | End: 2020-12-04

## 2020-12-04 RX ADMIN — MAGNESIUM SULFATE 2 G: 2 INJECTION INTRAVENOUS at 22:01

## 2020-12-04 RX ADMIN — METHYLPREDNISOLONE SODIUM SUCCINATE 125 MG: 125 INJECTION, POWDER, FOR SOLUTION INTRAMUSCULAR; INTRAVENOUS at 21:01

## 2020-12-04 RX ADMIN — IPRATROPIUM BROMIDE AND ALBUTEROL SULFATE 3 AMPULE: .5; 3 SOLUTION RESPIRATORY (INHALATION) at 21:05

## 2020-12-04 NOTE — ED PROVIDER NOTES
He is in acute distress. Appearance: He is well-developed. He is not ill-appearing or diaphoretic. Interventions: He is not intubated. Eyes:      Extraocular Movements: Extraocular movements intact. Pupils: Pupils are equal, round, and reactive to light. Neck:      Musculoskeletal: Normal range of motion and neck supple. Cardiovascular:      Rate and Rhythm: Regular rhythm. Tachycardia present. Pulmonary:      Effort: Tachypnea and respiratory distress present. No accessory muscle usage. He is not intubated. Breath sounds: No stridor. Examination of the right-upper field reveals decreased breath sounds. Examination of the left-upper field reveals decreased breath sounds. Examination of the right-middle field reveals decreased breath sounds. Examination of the left-middle field reveals decreased breath sounds. Examination of the right-lower field reveals decreased breath sounds. Examination of the left-lower field reveals decreased breath sounds. Decreased breath sounds present. No wheezing, rhonchi or rales. Chest:      Chest wall: No mass, deformity or tenderness. Abdominal:      Palpations: Abdomen is soft. Tenderness: There is no abdominal tenderness. There is no guarding or rebound. Musculoskeletal: Normal range of motion. Skin:     General: Skin is warm and dry. Capillary Refill: Capillary refill takes less than 2 seconds. Neurological:      General: No focal deficit present. Mental Status: He is alert and oriented to person, place, and time. Psychiatric:         Mood and Affect: Mood normal.          Procedures     MDM  Number of Diagnoses or Management Options  Diagnosis management comments: 56-year-old male with a past medical history of COPD on 4 to 5 L nasal cannula at home, ESRD not on dialysis, presents with complaints of shortness of breath from his assisted living facility.   Patient was found not wearing his supplemental oxygen and saturating in the low 80s. EMS arrived and placed patient on x-ray there after his distention at 90%. Vitals patient is tachypnea, requiring more oxygen and patient is prescribed at home. Patient is on 15 L negative. He is afebrile. On physical exam patient is in acute distress, conversational dyspnea, tachypnea, and agitated. Is diminished breath sounds out of all lung goals. Normal and S2. Abdomen soft nontender, no rebound or guarding. Patient has a right leg wound that looks chronic. Systems venous stasis. Patient has left above-knee amputation. Diagnostic labs are significant for leukopenia, anemia, creatinine of 7.1. Patient BNP is significantly elevated at 50,000. Likely from patient's severe BENJY. During patient's stay he was given supplemental oxygen, breathing treatments, steroids, and closely monitored. Patient would get agitated at times and pull off his BiPAP. 20 mg of ketamine were required to sedate patient without putting him in further respiratory distress. Decision was made to try to hold off intubation as long as possible. Patient required 2 rounds of ketamine to sedate him so he could tolerate the BiPAP. Patient was never hypotensive, or tachycardic, or febrile. Patient admitted to Dr. Barrios [facility] service for respiratory distress with hypoxia secondary to Covid pneumonia. ED Course as of Dec 05 1227   Fri Dec 04, 2020   2053 Patient signed out to me. Will evaluate patient at bedside. [JV]   2147 Patient likely with increased work of breathing. He is on nonrebreather. He is saturating at 92% on 15 L nonrebreather. BiPAP has been ordered. [JV]   2231 Lactate, Sepsis [JV]   Sat Dec 05, 2020   1071 Patient removed BiPAP. He was in acute respiratory distress. Given 20 mg of ketamine. Patient BiPAP was placed back on patient. Oxygen saturation climbing. Currently at 84%.     [JV]      ED Course User Index  [JV] Mauricio Villarreal MD         ED Course as of Dec 05 1227   Fri Dec 04, 2020 2053 Patient signed out to me. Will evaluate patient at bedside. [JV]   2147 Patient likely with increased work of breathing. He is on nonrebreather. He is saturating at 92% on 15 L nonrebreather. BiPAP has been ordered. [JV]   2231 Lactate, Sepsis [JV]   Sat Dec 05, 2020   1549 Patient removed BiPAP. He was in acute respiratory distress. Given 20 mg of ketamine. Patient BiPAP was placed back on patient. Oxygen saturation climbing. Currently at 84%. [JV]      ED Course User Index  [JV] Armida Villalobos MD       --------------------------------------------- PAST HISTORY ---------------------------------------------  Past Medical History:  has a past medical history of Anemia in chronic kidney disease, Arthropathy, unspecified, Below knee amputation ([address] Bifascicular block, Bipolar disorder, unspecified ([address] Cellulitis, CHF (congestive heart failure) ([address] Chronic kidney disease, stage 5 ([address] COPD (chronic obstructive pulmonary disease) ([address] Diabetes mellitus ([address] Fascicular block, Generalized muscle weakness, Hyperlipidemia, Hypertension, Intracranial injury without loss of consciousness ([address] Osteoporosis, Peripheral vascular disease ([address] Personality disorder ([address] Presence of intraocular lens, Right bundle branch block, Schizoaffective disorder ([address] Splenomegaly, Unspecified age-related cataract, Venous insufficiency (chronic) (peripheral), and Vitamin D deficiency. Past Surgical History:  has a past surgical history that includes Upper gastrointestinal endoscopy (N/A, 9/4/2020). Social History:  reports that he has been smoking cigarettes. He started smoking about 54 years ago. He has never used smokeless tobacco. He reports previous alcohol use. He reports that he does not use drugs. Family History: family history includes Breast Cancer in his mother; High Blood Pressure in his father. The patients home medications have been reviewed.     Allergies: Patient Range    aPTT 32.3 24.5 - 35.1 sec   Fibrinogen   Result Value Ref Range    Fibrinogen 459 225 - 540 mg/dL   Procalcitonin   Result Value Ref Range    Procalcitonin 0.64 (H) 0.00 - 0.08 ng/mL   C-Reactive Protein   Result Value Ref Range    CRP 2.8 (H) 0.0 - 0.4 mg/dL   Lactate Dehydrogenase   Result Value Ref Range     (H) 135 - 225 U/L   Comprehensive Metabolic Panel w/ Reflex to MG   Result Value Ref Range    Sodium 139 132 - 146 mmol/L    Potassium reflex Magnesium 4.3 3.5 - 5.0 mmol/L    Chloride 88 (L) 98 - 107 mmol/L    CO2 28 22 - 29 mmol/L    Anion Gap 23 (H) 7 - 16 mmol/L    Glucose 313 (H) 74 - 99 mg/dL    BUN 94 (H) 8 - 23 mg/dL    CREATININE 7.2 (H) 0.7 - 1.2 mg/dL    GFR Non-African American 8 >=60 mL/min/1.73    GFR African American 9     Calcium 8.9 8.6 - 10.2 mg/dL    Total Protein 6.7 6.4 - 8.3 g/dL    Alb 3.1 (L) 3.5 - 5.2 g/dL    Total Bilirubin 0.5 0.0 - 1.2 mg/dL    Alkaline Phosphatase 46 40 - 129 U/L    ALT 17 0 - 40 U/L    AST 27 0 - 39 U/L   Hepatic function panel   Result Value Ref Range    Bilirubin, Direct 0.4 (H) 0.0 - 0.3 mg/dL    Bilirubin, Indirect 0.1 0.0 - 1.0 mg/dL   Lactic acid, plasma   Result Value Ref Range    Lactic Acid 0.9 0.5 - 2.2 mmol/L   CBC auto differential   Result Value Ref Range    WBC 2.0 (L) 4.5 - 11.5 E9/L    RBC 2.51 (L) 3.80 - 5.80 E12/L    Hemoglobin 7.3 (L) 12.5 - 16.5 g/dL    Hematocrit 25.2 (L) 37.0 - 54.0 %    .4 (H) 80.0 - 99.9 fL    MCH 29.1 26.0 - 35.0 pg    MCHC 29.0 (L) 32.0 - 34.5 %    RDW 15.7 (H) 11.5 - 15.0 fL    Platelets 301 182 - 970 E9/L    MPV 10.6 7.0 - 12.0 fL    Neutrophils % 92.0 (H) 43.0 - 80.0 %    Immature Granulocytes % 2.0 0.0 - 5.0 %    Lymphocytes % 4.0 (L) 20.0 - 42.0 %    Monocytes % 2.0 2.0 - 12.0 %    Eosinophils % 0.0 0.0 - 6.0 %    Basophils % 0.0 0.0 - 2.0 %    Neutrophils Absolute 1.84 1.80 - 7.30 E9/L    Immature Granulocytes # 0.04 E9/L    Lymphocytes Absolute 0.08 (L) 1.50 - 4.00 E9/L    Monocytes Absolute 0.04 (L) 0.10 - 0.95 E9/L    Eosinophils Absolute 0.00 (L) 0.05 - 0.50 E9/L    Basophils Absolute 0.00 0.00 - 0.20 E9/L    Anisocytosis 1+     Polychromasia 1+    Protime-INR   Result Value Ref Range    Protime 15.1 (H) 9.3 - 12.4 sec    INR 1.3    Hemoglobin A1c   Result Value Ref Range    Hemoglobin A1C 5.5 4.0 - 5.6 %   POCT Glucose   Result Value Ref Range    Meter Glucose 354 (H) 74 - 99 mg/dL   POCT Glucose   Result Value Ref Range    Meter Glucose 320 (H) 74 - 99 mg/dL   EKG 12 Lead   Result Value Ref Range    Ventricular Rate 84 BPM    Atrial Rate 84 BPM    P-R Interval 170 ms    QRS Duration 138 ms    Q-T Interval 440 ms    QTc Calculation (Bazett) 519 ms    P Axis 35 degrees    R Axis -52 degrees    T Axis 71 degrees         RADIOLOGY:  XR CHEST PORTABLE   Final Result   Moderate cardiomegaly possibly with mild vascular congestion   New bilateral mid lung infiltrates and greater density in right lung base may   be secondary to edema and/or multifocal pneumonia   Small right pleural effusion may be chronic or recurrent. There may be a   component of atelectasis in the right lung base. CT CHEST WO CONTRAST    (Results Pending)       EKG: This EKG is signed and interpreted by me. Heart rate 84. Sinus rhythm with PACs. Right bundle kel block. Left fascicular block. QTc prolonged. No ST elevations or depressions. No peaked T waves.      ------------------------- NURSING NOTES AND VITALS REVIEWED ---------------------------  Date / Time Roomed:  12/4/2020  6:02 PM  ED Bed Assignment:  [id number]    The nursing notes within the ED encounter and vital signs as below have been reviewed.      Patient Vitals for the past 24 hrs:   BP Temp Temp src Pulse Resp SpO2 Height Weight   12/05/20 1207 -- -- -- -- 21 -- -- --   12/05/20 0929 -- -- -- -- 21 -- -- --   12/05/20 0830 (!) 165/74 98.8 °F (37.1 °C) Axillary 85 18 99 % -- --   12/05/20 0515 (!) 158/74 97.4 °F (36.3 °C) Oral 82 20 96 % -- -- 12/05/20 0421 (!) 174/97 98 °F (36.7 °C) -- 86 20 -- -- --   12/05/20 0331 -- -- -- -- -- 92 % -- --   12/05/20 0324 (!) 155/82 -- -- 77 -- 91 % -- --   12/05/20 0255 (!) 164/83 -- -- 79 20 90 % -- --   12/05/20 0241 -- -- -- -- 26 -- -- --   12/05/20 0200 (!) 178/78 -- -- 84 18 100 % -- --   12/05/20 0105 (!) 177/76 -- -- 79 16 99 % -- --   12/05/20 0022 (!) 184/96 -- -- 84 -- 95 % -- --   12/05/20 0012 -- -- -- -- 20 -- -- --   12/04/20 2342 (!) 165/83 -- -- 80 16 100 % -- --   12/04/20 2330 (!) 156/75 -- -- 80 16 100 % -- --   12/04/20 2204 (!) 173/85 -- -- 84 13 100 % -- --   12/04/20 2151 -- -- -- -- 22 -- -- --   12/04/20 2128 (!) 177/80 -- -- 85 28 96 % -- --   12/04/20 2106 (!) 164/82 -- -- 80 28 98 % -- --   12/04/20 1856 (!) 179/90 -- -- 101 28 91 % -- --   12/04/20 1826 (!) 179/90 98.1 °F (36.7 °C) Oral -- 28 91 % -- --   12/04/20 1810 -- -- -- -- -- 93 % -- --   12/04/20 1808 (!) 179/90 -- -- -- 28 -- 5' 10\" (1.778 m) 278 lb (126.1 kg)       Oxygen Saturation Interpretation: Improved after treatment    ------------------------------------------ PROGRESS NOTES ------------------------------------------    Counseling:  I have spoken with the patient and discussed todays results, in addition to providing specific details for the plan of care and counseling regarding the diagnosis and prognosis. Their questions are answered at this time and they are agreeable with the plan of admission.    --------------------------------- ADDITIONAL PROVIDER NOTES ---------------------------------  Consultations:  Spoke with Dr. Horacio Nunez. Discussed case. They will admit the patient. This patient's ED course included: a personal history and physicial examination, re-evaluation prior to disposition, multiple bedside re-evaluations, IV medications, cardiac monitoring and continuous pulse oximetry    This patient has remained hemodynamically stable during their ED course. Diagnosis:  1.  Pneumonia due to COVID-19 virus    2. Respiratory distress        Disposition:  Patient's disposition: Admit to telemetry  Patient's condition is stable.         Devin Webster MD  Resident  12/05/20 5065

## 2020-12-04 NOTE — LETTER
Whats most important for you to know is that your Medicare rights and benefits wont change because your health care provider is participating in [address]. Medicare will keep covering all of your medically necessary services. Even though Medicare will pay your doctor in a different way under [employer], how much you have to pay wont change. Health care providers and suppliers who are enrolled in Medicare will submit their Medicare claims like they always have. Youll have all the same Medicare rights and protections, including the right to choose which hospital, doctor, or other health care provider you see. If you dont want to get care from a health care provider whos participating in [address], then youll have to choose a different health care provider whos not participating in the Model. How can I give feedback about my health care? Medicare might ask you to take a voluntary survey about the services and care you received from [address] [place] [address] during your hospital stay or outpatient procedure and for a specific period of time afterwards. You can decide whether you want to take the voluntary survey, but if you do, itll help Medicare make [employer] and the care of other Medicare patients better. If you have concerns or complaints about your care, you can:   · Talk to your doctor or health care provider. · Contact your Beneficiary and Family Centered Care Quality Improvement   Organization [employer]). You can get your [employer]s phone number  at  Medicare.gov/contacts or by calling 1-800-MEDICARE. TTY users can call  1-507.759.5255. Where can I learn more about [employer]? Learn more about [employer] at https://innovation.cms.gov/initiatives/bpci-advanced/:  · A list of all the hospitals and physician group practices in the country participating in [address].

## 2020-12-05 ENCOUNTER — APPOINTMENT (OUTPATIENT)
Dept: ULTRASOUND IMAGING | Age: 60
DRG: 177 | End: 2020-12-05
Payer: MEDICARE

## 2020-12-05 ENCOUNTER — APPOINTMENT (OUTPATIENT)
Dept: CT IMAGING | Age: 60
DRG: 177 | End: 2020-12-05
Payer: MEDICARE

## 2020-12-05 PROBLEM — J96.01 ACUTE RESPIRATORY FAILURE WITH HYPOXIA (HCC): Status: ACTIVE | Noted: 2020-12-05

## 2020-12-05 PROBLEM — R09.02 HYPOXIA: Status: ACTIVE | Noted: 2020-12-05

## 2020-12-05 PROBLEM — U07.1 COVID-19: Status: ACTIVE | Noted: 2020-12-05

## 2020-12-05 LAB
ADENOVIRUS BY PCR: NOT DETECTED
ALBUMIN SERPL-MCNC: 3.1 G/DL (ref 3.5–5.2)
ALP BLD-CCNC: 46 U/L (ref 40–129)
ALT SERPL-CCNC: 17 U/L (ref 0–40)
ANION GAP SERPL CALCULATED.3IONS-SCNC: 23 MMOL/L (ref 7–16)
ANISOCYTOSIS: ABNORMAL
APTT: 32.3 SEC (ref 24.5–35.1)
AST SERPL-CCNC: 27 U/L (ref 0–39)
BASOPHILS ABSOLUTE: 0 E9/L (ref 0–0.2)
BASOPHILS RELATIVE PERCENT: 0 % (ref 0–2)
BILIRUB SERPL-MCNC: 0.5 MG/DL (ref 0–1.2)
BILIRUBIN DIRECT: 0.4 MG/DL (ref 0–0.3)
BILIRUBIN, INDIRECT: 0.1 MG/DL (ref 0–1)
BORDETELLA PARAPERTUSSIS BY PCR: NOT DETECTED
BORDETELLA PERTUSSIS BY PCR: NOT DETECTED
BUN BLDV-MCNC: 94 MG/DL (ref 8–23)
C-REACTIVE PROTEIN: 2.8 MG/DL (ref 0–0.4)
CALCIUM SERPL-MCNC: 8.9 MG/DL (ref 8.6–10.2)
CHLAMYDOPHILIA PNEUMONIAE BY PCR: NOT DETECTED
CHLORIDE BLD-SCNC: 88 MMOL/L (ref 98–107)
CHLORIDE URINE RANDOM: 35 MMOL/L
CO2: 28 MMOL/L (ref 22–29)
CORONAVIRUS 229E BY PCR: NOT DETECTED
CORONAVIRUS HKU1 BY PCR: NOT DETECTED
CORONAVIRUS NL63 BY PCR: NOT DETECTED
CORONAVIRUS OC43 BY PCR: NOT DETECTED
CREAT SERPL-MCNC: 7.2 MG/DL (ref 0.7–1.2)
CREATININE URINE: 66 MG/DL (ref 40–278)
EKG ATRIAL RATE: 84 BPM
EKG P AXIS: 35 DEGREES
EKG P-R INTERVAL: 170 MS
EKG Q-T INTERVAL: 440 MS
EKG QRS DURATION: 138 MS
EKG QTC CALCULATION (BAZETT): 519 MS
EKG R AXIS: -52 DEGREES
EKG T AXIS: 71 DEGREES
EKG VENTRICULAR RATE: 84 BPM
EOSINOPHILS ABSOLUTE: 0 E9/L (ref 0.05–0.5)
EOSINOPHILS RELATIVE PERCENT: 0 % (ref 0–6)
FIBRINOGEN: 459 MG/DL (ref 225–540)
GFR AFRICAN AMERICAN: 9
GFR NON-AFRICAN AMERICAN: 8 ML/MIN/1.73
GLUCOSE BLD-MCNC: 313 MG/DL (ref 74–99)
HBA1C MFR BLD: 5.5 % (ref 4–5.6)
HCT VFR BLD CALC: 25.2 % (ref 37–54)
HEMOGLOBIN: 7.3 G/DL (ref 12.5–16.5)
HUMAN METAPNEUMOVIRUS BY PCR: NOT DETECTED
HUMAN RHINOVIRUS/ENTEROVIRUS BY PCR: NOT DETECTED
IMMATURE GRANULOCYTES #: 0.04 E9/L
IMMATURE GRANULOCYTES %: 2 % (ref 0–5)
INFLUENZA A BY PCR: NOT DETECTED
INFLUENZA B BY PCR: NOT DETECTED
INR BLD: 1.3
INR BLD: 1.3
LACTATE DEHYDROGENASE: 283 U/L (ref 135–225)
LACTIC ACID: 0.9 MMOL/L (ref 0.5–2.2)
LYMPHOCYTES ABSOLUTE: 0.08 E9/L (ref 1.5–4)
LYMPHOCYTES RELATIVE PERCENT: 4 % (ref 20–42)
MCH RBC QN AUTO: 29.1 PG (ref 26–35)
MCHC RBC AUTO-ENTMCNC: 29 % (ref 32–34.5)
MCV RBC AUTO: 100.4 FL (ref 80–99.9)
METER GLUCOSE: 167 MG/DL (ref 74–99)
METER GLUCOSE: 320 MG/DL (ref 74–99)
METER GLUCOSE: 354 MG/DL (ref 74–99)
METER GLUCOSE: 412 MG/DL (ref 74–99)
MONOCYTES ABSOLUTE: 0.04 E9/L (ref 0.1–0.95)
MONOCYTES RELATIVE PERCENT: 2 % (ref 2–12)
MYCOPLASMA PNEUMONIAE BY PCR: NOT DETECTED
NEUTROPHILS ABSOLUTE: 1.84 E9/L (ref 1.8–7.3)
NEUTROPHILS RELATIVE PERCENT: 92 % (ref 43–80)
OSMOLALITY URINE: 370 MOSM/KG (ref 300–900)
PARAINFLUENZA VIRUS 1 BY PCR: NOT DETECTED
PARAINFLUENZA VIRUS 2 BY PCR: NOT DETECTED
PARAINFLUENZA VIRUS 3 BY PCR: NOT DETECTED
PARAINFLUENZA VIRUS 4 BY PCR: NOT DETECTED
PDW BLD-RTO: 15.7 FL (ref 11.5–15)
PLATELET # BLD: 155 E9/L (ref 130–450)
PMV BLD AUTO: 10.6 FL (ref 7–12)
POLYCHROMASIA: ABNORMAL
POTASSIUM REFLEX MAGNESIUM: 4.3 MMOL/L (ref 3.5–5)
PROCALCITONIN: 0.64 NG/ML (ref 0–0.08)
PROTEIN PROTEIN: 405 MG/DL (ref 0–12)
PROTEIN/CREAT RATIO: 6.1
PROTEIN/CREAT RATIO: 6.1 (ref 0–0.2)
PROTHROMBIN TIME: 15.1 SEC (ref 9.3–12.4)
PROTHROMBIN TIME: 15.5 SEC (ref 9.3–12.4)
RBC # BLD: 2.51 E12/L (ref 3.8–5.8)
RESPIRATORY SYNCYTIAL VIRUS BY PCR: NOT DETECTED
SARS-COV-2, PCR: DETECTED
SODIUM BLD-SCNC: 139 MMOL/L (ref 132–146)
SODIUM URINE: 46 MMOL/L
TOTAL PROTEIN: 6.7 G/DL (ref 6.4–8.3)
WBC # BLD: 2 E9/L (ref 4.5–11.5)

## 2020-12-05 PROCEDURE — 36415 COLL VENOUS BLD VENIPUNCTURE: CPT

## 2020-12-05 PROCEDURE — 83036 HEMOGLOBIN GLYCOSYLATED A1C: CPT

## 2020-12-05 PROCEDURE — 82962 GLUCOSE BLOOD TEST: CPT

## 2020-12-05 PROCEDURE — 84145 PROCALCITONIN (PCT): CPT

## 2020-12-05 PROCEDURE — 2580000003 HC RX 258: Performed by: PHYSICIAN ASSISTANT

## 2020-12-05 PROCEDURE — 82436 ASSAY OF URINE CHLORIDE: CPT

## 2020-12-05 PROCEDURE — 71250 CT THORAX DX C-: CPT

## 2020-12-05 PROCEDURE — 85384 FIBRINOGEN ACTIVITY: CPT

## 2020-12-05 PROCEDURE — 2060000000 HC ICU INTERMEDIATE R&B

## 2020-12-05 PROCEDURE — 85610 PROTHROMBIN TIME: CPT

## 2020-12-05 PROCEDURE — 83935 ASSAY OF URINE OSMOLALITY: CPT

## 2020-12-05 PROCEDURE — 82570 ASSAY OF URINE CREATININE: CPT

## 2020-12-05 PROCEDURE — 93010 ELECTROCARDIOGRAM REPORT: CPT | Performed by: INTERNAL MEDICINE

## 2020-12-05 PROCEDURE — 6370000000 HC RX 637 (ALT 250 FOR IP): Performed by: NURSE PRACTITIONER

## 2020-12-05 PROCEDURE — 85025 COMPLETE CBC W/AUTO DIFF WBC: CPT

## 2020-12-05 PROCEDURE — 80076 HEPATIC FUNCTION PANEL: CPT

## 2020-12-05 PROCEDURE — 85730 THROMBOPLASTIN TIME PARTIAL: CPT

## 2020-12-05 PROCEDURE — 83615 LACTATE (LD) (LDH) ENZYME: CPT

## 2020-12-05 PROCEDURE — 6370000000 HC RX 637 (ALT 250 FOR IP): Performed by: PHYSICIAN ASSISTANT

## 2020-12-05 PROCEDURE — 2500000003 HC RX 250 WO HCPCS: Performed by: EMERGENCY MEDICINE

## 2020-12-05 PROCEDURE — 0202U NFCT DS 22 TRGT SARS-COV-2: CPT

## 2020-12-05 PROCEDURE — 6360000002 HC RX W HCPCS: Performed by: NURSE PRACTITIONER

## 2020-12-05 PROCEDURE — 94660 CPAP INITIATION&MGMT: CPT

## 2020-12-05 PROCEDURE — 87205 SMEAR GRAM STAIN: CPT

## 2020-12-05 PROCEDURE — 76770 US EXAM ABDO BACK WALL COMP: CPT

## 2020-12-05 PROCEDURE — 80053 COMPREHEN METABOLIC PANEL: CPT

## 2020-12-05 PROCEDURE — 83605 ASSAY OF LACTIC ACID: CPT

## 2020-12-05 PROCEDURE — 84156 ASSAY OF PROTEIN URINE: CPT

## 2020-12-05 PROCEDURE — 2500000003 HC RX 250 WO HCPCS

## 2020-12-05 PROCEDURE — 84300 ASSAY OF URINE SODIUM: CPT

## 2020-12-05 PROCEDURE — 86140 C-REACTIVE PROTEIN: CPT

## 2020-12-05 PROCEDURE — 82728 ASSAY OF FERRITIN: CPT

## 2020-12-05 PROCEDURE — 6360000002 HC RX W HCPCS: Performed by: PHYSICIAN ASSISTANT

## 2020-12-05 RX ORDER — KETAMINE HYDROCHLORIDE 10 MG/ML
INJECTION, SOLUTION INTRAMUSCULAR; INTRAVENOUS
Status: COMPLETED
Start: 2020-12-05 | End: 2020-12-05

## 2020-12-05 RX ORDER — SODIUM CHLORIDE 0.9 % (FLUSH) 0.9 %
10 SYRINGE (ML) INJECTION EVERY 12 HOURS SCHEDULED
Status: DISCONTINUED | OUTPATIENT
Start: 2020-12-05 | End: 2020-12-09 | Stop reason: HOSPADM

## 2020-12-05 RX ORDER — ACETAMINOPHEN 325 MG/1
650 TABLET ORAL EVERY 6 HOURS PRN
Status: DISCONTINUED | OUTPATIENT
Start: 2020-12-05 | End: 2020-12-09 | Stop reason: HOSPADM

## 2020-12-05 RX ORDER — DEXTROSE MONOHYDRATE 25 G/50ML
12.5 INJECTION, SOLUTION INTRAVENOUS PRN
Status: DISCONTINUED | OUTPATIENT
Start: 2020-12-05 | End: 2020-12-09 | Stop reason: HOSPADM

## 2020-12-05 RX ORDER — ARIPIPRAZOLE 5 MG/1
2.5 TABLET ORAL DAILY
Status: DISCONTINUED | OUTPATIENT
Start: 2020-12-05 | End: 2020-12-09 | Stop reason: HOSPADM

## 2020-12-05 RX ORDER — ACETAMINOPHEN 650 MG/1
650 SUPPOSITORY RECTAL EVERY 6 HOURS PRN
Status: DISCONTINUED | OUTPATIENT
Start: 2020-12-05 | End: 2020-12-09 | Stop reason: HOSPADM

## 2020-12-05 RX ORDER — TRIAMCINOLONE ACETONIDE 1 MG/G
CREAM TOPICAL 2 TIMES DAILY
Status: DISCONTINUED | OUTPATIENT
Start: 2020-12-05 | End: 2020-12-09 | Stop reason: HOSPADM

## 2020-12-05 RX ORDER — DILTIAZEM HYDROCHLORIDE 240 MG/1
240 CAPSULE, EXTENDED RELEASE ORAL DAILY
Status: DISCONTINUED | OUTPATIENT
Start: 2020-12-05 | End: 2020-12-05 | Stop reason: CLARIF

## 2020-12-05 RX ORDER — ONDANSETRON 2 MG/ML
4 INJECTION INTRAMUSCULAR; INTRAVENOUS EVERY 6 HOURS PRN
Status: DISCONTINUED | OUTPATIENT
Start: 2020-12-05 | End: 2020-12-05

## 2020-12-05 RX ORDER — METOLAZONE 5 MG/1
5 TABLET ORAL DAILY
COMMUNITY

## 2020-12-05 RX ORDER — POLYETHYLENE GLYCOL 3350 17 G/17G
17 POWDER, FOR SOLUTION ORAL DAILY PRN
Status: DISCONTINUED | OUTPATIENT
Start: 2020-12-05 | End: 2020-12-09 | Stop reason: HOSPADM

## 2020-12-05 RX ORDER — FUROSEMIDE 20 MG/1
20 TABLET ORAL 2 TIMES DAILY
Status: DISCONTINUED | OUTPATIENT
Start: 2020-12-05 | End: 2020-12-06

## 2020-12-05 RX ORDER — INSULIN GLARGINE 100 [IU]/ML
32 INJECTION, SOLUTION SUBCUTANEOUS DAILY
Status: DISCONTINUED | OUTPATIENT
Start: 2020-12-05 | End: 2020-12-09 | Stop reason: HOSPADM

## 2020-12-05 RX ORDER — NICOTINE POLACRILEX 4 MG
15 LOZENGE BUCCAL PRN
Status: DISCONTINUED | OUTPATIENT
Start: 2020-12-05 | End: 2020-12-09 | Stop reason: HOSPADM

## 2020-12-05 RX ORDER — INSULIN GLARGINE 100 [IU]/ML
55 INJECTION, SOLUTION SUBCUTANEOUS EVERY MORNING
Status: DISCONTINUED | OUTPATIENT
Start: 2020-12-05 | End: 2020-12-05

## 2020-12-05 RX ORDER — KETAMINE HYDROCHLORIDE 10 MG/ML
20 INJECTION, SOLUTION INTRAMUSCULAR; INTRAVENOUS ONCE
Status: COMPLETED | OUTPATIENT
Start: 2020-12-05 | End: 2020-12-05

## 2020-12-05 RX ORDER — CHOLECALCIFEROL (VITAMIN D3) 50 MCG
4000 TABLET ORAL DAILY
Status: DISCONTINUED | OUTPATIENT
Start: 2020-12-05 | End: 2020-12-09 | Stop reason: HOSPADM

## 2020-12-05 RX ORDER — ACETAMINOPHEN 650 MG/1
650 SUPPOSITORY RECTAL EVERY 6 HOURS PRN
Status: DISCONTINUED | OUTPATIENT
Start: 2020-12-05 | End: 2020-12-05 | Stop reason: SDUPTHER

## 2020-12-05 RX ORDER — FAMOTIDINE 20 MG/1
10 TABLET, FILM COATED ORAL DAILY
Status: DISCONTINUED | OUTPATIENT
Start: 2020-12-05 | End: 2020-12-09 | Stop reason: HOSPADM

## 2020-12-05 RX ORDER — DILTIAZEM HYDROCHLORIDE 240 MG/1
240 CAPSULE, COATED, EXTENDED RELEASE ORAL DAILY
Status: DISCONTINUED | OUTPATIENT
Start: 2020-12-05 | End: 2020-12-09 | Stop reason: HOSPADM

## 2020-12-05 RX ORDER — CLONIDINE HYDROCHLORIDE 0.1 MG/1
0.1 TABLET ORAL 3 TIMES DAILY
Status: DISCONTINUED | OUTPATIENT
Start: 2020-12-05 | End: 2020-12-09 | Stop reason: HOSPADM

## 2020-12-05 RX ORDER — DEXTROSE MONOHYDRATE 50 MG/ML
100 INJECTION, SOLUTION INTRAVENOUS PRN
Status: DISCONTINUED | OUTPATIENT
Start: 2020-12-05 | End: 2020-12-09 | Stop reason: HOSPADM

## 2020-12-05 RX ORDER — INSULIN GLARGINE 100 [IU]/ML
32 INJECTION, SOLUTION SUBCUTANEOUS NIGHTLY
Status: DISCONTINUED | OUTPATIENT
Start: 2020-12-05 | End: 2020-12-05

## 2020-12-05 RX ORDER — SODIUM CHLORIDE 0.9 % (FLUSH) 0.9 %
10 SYRINGE (ML) INJECTION PRN
Status: DISCONTINUED | OUTPATIENT
Start: 2020-12-05 | End: 2020-12-09 | Stop reason: HOSPADM

## 2020-12-05 RX ORDER — HEPARIN SODIUM 10000 [USP'U]/ML
5000 INJECTION, SOLUTION INTRAVENOUS; SUBCUTANEOUS EVERY 8 HOURS SCHEDULED
Status: DISCONTINUED | OUTPATIENT
Start: 2020-12-05 | End: 2020-12-09 | Stop reason: HOSPADM

## 2020-12-05 RX ORDER — CITALOPRAM 20 MG/1
20 TABLET ORAL DAILY
Status: DISCONTINUED | OUTPATIENT
Start: 2020-12-05 | End: 2020-12-09 | Stop reason: HOSPADM

## 2020-12-05 RX ORDER — LORAZEPAM 2 MG/ML
1 INJECTION INTRAMUSCULAR ONCE
Status: COMPLETED | OUTPATIENT
Start: 2020-12-05 | End: 2020-12-05

## 2020-12-05 RX ORDER — PROMETHAZINE HYDROCHLORIDE 25 MG/1
12.5 TABLET ORAL EVERY 6 HOURS PRN
Status: DISCONTINUED | OUTPATIENT
Start: 2020-12-05 | End: 2020-12-05

## 2020-12-05 RX ADMIN — TRIAMCINOLONE ACETONIDE: 1 CREAM TOPICAL at 20:20

## 2020-12-05 RX ADMIN — HEPARIN SODIUM 5000 UNITS: 10000 INJECTION INTRAVENOUS; SUBCUTANEOUS at 06:51

## 2020-12-05 RX ADMIN — HEPARIN SODIUM 5000 UNITS: 10000 INJECTION INTRAVENOUS; SUBCUTANEOUS at 13:20

## 2020-12-05 RX ADMIN — FUROSEMIDE 20 MG: 20 TABLET ORAL at 16:59

## 2020-12-05 RX ADMIN — CLONIDINE HYDROCHLORIDE 0.1 MG: 0.1 TABLET ORAL at 20:20

## 2020-12-05 RX ADMIN — INSULIN LISPRO 4 UNITS: 100 INJECTION, SOLUTION INTRAVENOUS; SUBCUTANEOUS at 11:29

## 2020-12-05 RX ADMIN — Medication 10 ML: at 08:48

## 2020-12-05 RX ADMIN — INSULIN LISPRO 1 UNITS: 100 INJECTION, SOLUTION INTRAVENOUS; SUBCUTANEOUS at 20:31

## 2020-12-05 RX ADMIN — OYSTER SHELL CALCIUM WITH VITAMIN D 1 TABLET: 500; 200 TABLET, FILM COATED ORAL at 20:22

## 2020-12-05 RX ADMIN — HEPARIN SODIUM 5000 UNITS: 10000 INJECTION INTRAVENOUS; SUBCUTANEOUS at 22:54

## 2020-12-05 RX ADMIN — LORAZEPAM 1 MG: 2 INJECTION INTRAMUSCULAR; INTRAVENOUS at 10:02

## 2020-12-05 RX ADMIN — Medication 10 ML: at 20:22

## 2020-12-05 RX ADMIN — KETAMINE HYDROCHLORIDE 20 MG: 10 INJECTION, SOLUTION INTRAMUSCULAR; INTRAVENOUS at 02:27

## 2020-12-05 RX ADMIN — INSULIN LISPRO 12 UNITS: 100 INJECTION, SOLUTION INTRAVENOUS; SUBCUTANEOUS at 17:00

## 2020-12-05 RX ADMIN — TRIAMCINOLONE ACETONIDE: 1 CREAM TOPICAL at 13:16

## 2020-12-05 RX ADMIN — CITALOPRAM HYDROBROMIDE 20 MG: 20 TABLET ORAL at 13:18

## 2020-12-05 RX ADMIN — DEXAMETHASONE 6 MG: 4 TABLET ORAL at 16:59

## 2020-12-05 RX ADMIN — KETAMINE HYDROCHLORIDE 20 MG: 10 INJECTION, SOLUTION INTRAMUSCULAR; INTRAVENOUS at 04:07

## 2020-12-05 RX ADMIN — CLONIDINE HYDROCHLORIDE 0.1 MG: 0.1 TABLET ORAL at 13:17

## 2020-12-05 RX ADMIN — ARIPIPRAZOLE 2.5 MG: 5 TABLET ORAL at 13:17

## 2020-12-05 RX ADMIN — DILTIAZEM HYDROCHLORIDE 240 MG: 240 CAPSULE, COATED, EXTENDED RELEASE ORAL at 16:59

## 2020-12-05 RX ADMIN — INSULIN LISPRO 5 UNITS: 100 INJECTION, SOLUTION INTRAVENOUS; SUBCUTANEOUS at 07:35

## 2020-12-05 RX ADMIN — FUROSEMIDE 20 MG: 20 TABLET ORAL at 13:17

## 2020-12-05 RX ADMIN — INSULIN GLARGINE 32 UNITS: 100 INJECTION, SOLUTION SUBCUTANEOUS at 08:49

## 2020-12-05 RX ADMIN — FAMOTIDINE 10 MG: 20 TABLET, FILM COATED ORAL at 13:17

## 2020-12-05 ASSESSMENT — ENCOUNTER SYMPTOMS
NAUSEA: 0
TROUBLE SWALLOWING: 0
VOICE CHANGE: 0
ABDOMINAL DISTENTION: 0
COLOR CHANGE: 0
CONSTIPATION: 0
DIARRHEA: 0
ABDOMINAL PAIN: 0
SHORTNESS OF BREATH: 1
VOMITING: 0

## 2020-12-05 ASSESSMENT — PAIN SCALES - GENERAL
PAINLEVEL_OUTOF10: 10
PAINLEVEL_OUTOF10: 10

## 2020-12-05 NOTE — CONSULTS
Pharmacy Documentation     Medication: Remdesivir     Date: 12/05/2020  CNP:  939 Erna Mendez consulted to initiate remdesivir. Patient does not currently meet [place] Criteria for Use to initiate remdesivir based on renal function. The eGFR is 8. Pharmacy will sign off. Please re-consult if the clinical condition changes and patient meets criteria for initiation based on [place] Criteria for Use.      Thank you for this consult,  sesar jean-baptiste

## 2020-12-05 NOTE — ED NOTES
Pt found without NRB again. At end of bed and has urinated all over floor. Pt appears dusky and obtunded.      Marshal Kapadia RN  12/04/20 2100

## 2020-12-05 NOTE — H&P
Jacob Garvey M.D. History and Physical      CHIEF COMPLAINT:  Shortness of Breath    Reason for Admission:  Covid 19, Hypoxia    History Obtained From:  electronic medical record    HISTORY OF PRESENT ILLNESS:      The patient is a 56 y.o. male of [facility] with significant past medical history of CKD, bipolar disorder, CHF, diabetes mellitus, COPD, PVD. Patient was sent from [facility] for hypoxia over the last 2 days. Per nursing home patient was saturating in the 80s on room air. Patient was placed on nonrebreather which did improve him to the 90s on transport to the emergency department. Patient has had continued shortness of breath now requiring BiPAP. Oxygen saturations are stable with this. Patient is lethargic and does not answer questions at this time however will respond with verbal and tactile stimuli. Patient not very interactive with assessment or answering questions otherwise at this time.       All labs personally reviewed   All imaging personally reviewed     Past Medical History:        Diagnosis Date    Anemia in chronic kidney disease 2020    Arthropathy, unspecified     Below knee amputation ([address] 2002    right     Bifascicular block 2010    Bipolar disorder, unspecified ([address] 2002    Cellulitis 2017    LLE    CHF (congestive heart failure) ([address]     Chronic kidney disease, stage 5 ([address] 10/16/2020    COPD (chronic obstructive pulmonary disease) ([address]     Diabetes mellitus ([address]     Fascicular block 2010    Generalized muscle weakness 2018    Hyperlipidemia     Hypertension     Intracranial injury without loss of consciousness ([address] 2002    Osteoporosis 2003    Peripheral vascular disease ([address]     Personality disorder ([address]     Presence of intraocular lens 2009    unspecified eye    Right bundle branch block 2010    Schizoaffective disorder ([address]     Splenomegaly 2010    Unspecified age-related cataract 2002    unspecified Reason For Visit  LUCIA QUARLES CST     eye    Venous insufficiency (chronic) (peripheral) 2007    Vitamin D deficiency 2018     Past Surgical History:        Procedure Laterality Date    UPPER GASTROINTESTINAL ENDOSCOPY N/A 9/4/2020    EGD ESOPHAGOGASTRODUODENOSCOPY performed by Roxi Marie MD at [address] [place] [address]         Medications Prior to Admission:    Medications Prior to Admission: metOLazone (ZAROXOLYN) 5 MG tablet, Take 5 mg by mouth daily  pantoprazole (PROTONIX) 40 MG tablet, Take 1 tablet by mouth 2 times daily (before meals)  ARIPiprazole (ABILIFY) 5 MG tablet, Take 2.5 mg by mouth daily  citalopram (CELEXA) 10 MG tablet, Take 20 mg by mouth daily  cloNIDine (CATAPRES) 0.1 MG tablet, Take 0.1 mg by mouth three times daily  dilTIAZem (DILACOR XR) 240 MG extended release capsule, Take 240 mg by mouth daily  doxepin (SINEQUAN) 25 MG capsule, Take 25 mg by mouth nightly  fenofibrate (TRICOR) 48 MG tablet, Take 48 mg by mouth nightly  furosemide (LASIX) 20 MG tablet, Take 20 mg by mouth 2 times daily Starts 9/8/20  insulin detemir (LEVEMIR) 100 UNIT/ML injection vial, Inject 32 Units into the skin every morning   calcium-vitamin D (OSCAL-500) 500-200 MG-UNIT per tablet, Take 1 tablet by mouth 2 times daily  triamcinolone (KENALOG) 0.1 % cream, Apply topically 2 times daily Apply topically 2 times daily. legs  Cholecalciferol (HM VITAMIN D3) 100 MCG (4000 UT) CAPS, Take 1 tablet by mouth daily    Allergies:  Patient has no known allergies. Social History:   TOBACCO:   reports that he has been smoking cigarettes. He started smoking about 54 years ago. He has never used smokeless tobacco.  ETOH:   reports previous alcohol use. MARITAL STATUS:    OCCUPATION:      Family History:       Problem Relation Age of Onset    Breast Cancer Mother     High Blood Pressure Father        REVIEW OF SYSTEMS:    Unable to obtain at this time as patient is lethargic and on BiPAP. Vitals:    BP (!) 165/74   Pulse 85   Temp 98.8 °F (37.1 °C) (Axillary)

## 2020-12-05 NOTE — PROGRESS NOTES
Called into room by BRIAN, patient at the side of the bed with bipap off urinating. Pt unable to be reoriented. Patient did lay back down and allow us to clean him up. Placed on 15L HFNC at this time, SpO2 88%. 1 mg of Ativan given. Pt opens eyes to words and only answers \"yes\" to questions. Turned to right side and placed back on BiPAP. Will monitor.

## 2020-12-05 NOTE — CARE COORDINATION
Social Work discharge planning   Per Hair Meek at [facility], pt is a long term resident there Ph [phone]. SW/CM will need to follow up Monday with pt's guardian and Joceline Spauldingw' admits. N17 started & Ambulance forms in folder.    Electronically signed by Malia Scott on 12/5/2020 at 9:29 AM

## 2020-12-05 NOTE — PROGRESS NOTES
Date: 12/5/2020    Time: 12:09 PM    Patient Placed On BIPAP/CPAP/ Non-Invasive Ventilation? No    If no must comment. Facial area red/color change? No           If YES are Blister/Lesion present? No   If yes must notify nursing staff  BIPAP/CPAP skin barrier?   Yes    Skin barrier type:mepilexlite       Comments:        Jany Hutton

## 2020-12-05 NOTE — ED NOTES
Pt removed NRB again, pt attempting to remove NRB w/RN at bedside, redirection given. Dr. Randa Lerma notified. No new orders at this time.      Yuliya Adams RN  12/04/20 2128

## 2020-12-05 NOTE — ED NOTES
RT at bedside to place pt on BiPAP     Carissasphine [facility], [address] [facility]  12/04/20 0167

## 2020-12-05 NOTE — DISCHARGE INSTR - COC
Continuity of Care Form    Patient Name: Angela Arias   :  1960  MRN:  [id number]    Admit date:  2020  Discharge date:  2020    Code Status Order: Full Code   Advance Directives:   [facility] Documentation     Date/Time Healthcare Directive Type of Healthcare Directive Copy in [facility] [address] Agent's Name Healthcare Agent's Phone Number    [phone]  Yes, patient has an advance directive for healthcare treatment  --  --  Legal Guardian  --  --          Admitting Physician:  Vasquez Baca MD  PCP: Gracie James DO    Discharging Nurse: [facility] Unit/Room#: 7579/6385-T  Discharging Unit Phone Number: [phone]    Emergency Contact:   Extended Emergency Contact Information  Primary Emergency Contact: [address] Phone: [phone]  Work Phone: [phone]  Relation: Legal Guardian    Past Surgical History:  Past Surgical History:   Procedure Laterality Date    UPPER GASTROINTESTINAL ENDOSCOPY N/A 2020    EGD ESOPHAGOGASTRODUODENOSCOPY performed by Dino Thomas MD at [address]       Immunization History: There is no immunization history on file for this patient.     Active Problems:  Patient Active Problem List   Diagnosis Code    GI bleed K92.2    Acute blood loss anemia D62    Essential hypertension I10    COPD (chronic obstructive pulmonary disease) ([facility]) J44.9    Uncontrolled diabetes mellitus ([facility]) E11.65    Tobacco abuse Z72.0    Depression F32.9    Psychiatric disorder F99    Diabetes mellitus type 2, uncontrolled ([facility] E11.65    Hyperlipidemia LDL goal <100 E78.5    CKD (chronic kidney disease) stage 4, GFR 15-29 ml/min ([facility]) N18.4    Acute kidney injury ([facility] N17.9    Acute respiratory failure with hypoxia ([facility]) J96.01    Hypoxia R09.02    COVID-19 U07.1       Isolation/Infection:   Isolation          Droplet Plus        Patient Infection Status     Infection Onset Added Last Indicated Last Indicated By Review Planned Expiration Resolved Resolved By    COVID-19 12/05/20 12/05/20 12/05/20 Respiratory Panel, Molecular, with COVID-19 (Restricted: peds pts or suitable admitted adults) 12/12/20 12/19/20      Resolved    COVID-19 Rule Out 12/05/20 12/05/20 12/05/20 Respiratory Panel, Molecular, with COVID-19 (Restricted: peds pts or suitable admitted adults) (Ordered)   12/05/20 Rule-Out Test Resulted    COVID-19 Rule Out 09/03/20 09/03/20 09/03/20 COVID-19 (Ordered)   09/03/20 Rule-Out Test Resulted          Nurse Assessment:  Last Vital Signs: BP (!) 165/74   Pulse 85   Temp 98.8 °F (37.1 °C) (Axillary)   Resp 18   Ht 5' 10\" (1.778 m)   Wt 278 lb (126.1 kg)   SpO2 99%   BMI 39.89 kg/m²     Last documented pain score (0-10 scale): Pain Level: 10  Last Weight:   Wt Readings from Last 1 Encounters:   12/04/20 278 lb (126.1 kg)     Mental Status:  alert    IV Access:  - None    Nursing Mobility/ADLs:  Walking   Dependent  Transfer  Dependent  Bathing  Dependent  Dressing  Dependent  Toileting  Dependent  Feeding  Independent  Med Admin  Assisted  Med Delivery   whole    Wound Care Documentation and Therapy:        Elimination:  Continence:   · Bowel: No  · Bladder: No  Urinary Catheter: None   Colostomy/Ileostomy/Ileal Conduit: No       Date of Last BM: 12/8/2020  No intake or output data in the 24 hours ending 12/05/20 0917  No intake/output data recorded. Safety Concerns: At Risk for Falls    Impairments/Disabilities:      Amputation - Right below the knee    Nutrition Therapy:  Current Nutrition Therapy:   - Oral Diet:  Carb Control 4 carbs/meal (1800kcals/day)    Routes of Feeding: Oral  Liquids: Thin Liquids  Daily Fluid Restriction: no  Last Modified Barium Swallow with Video (Video Swallowing Test): not done    Treatments at the Time of Hospital Discharge:   Respiratory Treatments: none  Oxygen Therapy:  is on oxygen at 4-5 L/min per nasal cannula.   Ventilator:    - BiPAP   IPAP: 16 cmH20, CPAP/EPAP: 6 cmH2O only when sleeping    Rehab Therapies: Physical Therapy  Weight Bearing Status/Restrictions: No weight bearing restirctions  Other Medical Equipment (for information only, NOT a DME order):  bedside commode  Other Treatments: ***    Patient's personal belongings (please select all that are sent with patient):  None    RN SIGNATURE:  Electronically signed by Lisa Lopez RN on 12/8/20 at 6:19 PM EST    CASE MANAGEMENT/SOCIAL WORK SECTION    Inpatient Status Date: 12/5/2020    Readmission Risk Assessment Score:  Readmission Risk              Risk of Unplanned Readmission:        23           Discharging to Facility/ Agency   · Name: [facility]  Address:   [address] [place] [address]         Phone: [phone]       Fax: [phone]        Dialysis Facility (if applicable)   · Name:  · Address:  · Dialysis Schedule:  · Phone:  · Fax:    / signature: {Esignature:[id number]}    PHYSICIAN SECTION    Prognosis: {Prognosis:9501917526}    Condition at Discharge: [address] [place] [address] Patient Condition:[id number]}    Rehab Potential (if transferring to Rehab): {Prognosis:3896314873}    Recommended Labs or Other Treatments After Discharge: ***    Physician Certification: I certify the above information and transfer of Dafne Wilkes  is necessary for the continuing treatment of the diagnosis listed and that he requires Intermediate Nursing Care for greater than 30 days.      Update Admission H&P: {CHP DME Changes in BFPGB:180890990}    PHYSICIAN SIGNATURE:  {Esignature:[id number]}

## 2020-12-06 LAB
ANION GAP SERPL CALCULATED.3IONS-SCNC: 12 MMOL/L (ref 7–16)
APTT: 29.3 SEC (ref 24.5–35.1)
BUN BLDV-MCNC: 114 MG/DL (ref 8–23)
CALCIUM SERPL-MCNC: 8.7 MG/DL (ref 8.6–10.2)
CHLORIDE BLD-SCNC: 89 MMOL/L (ref 98–107)
CO2: 37 MMOL/L (ref 22–29)
CREAT SERPL-MCNC: 7.7 MG/DL (ref 0.7–1.2)
EOSINOPHIL, URINE: 0 % (ref 0–1)
FIBRINOGEN: 327 MG/DL (ref 225–540)
GFR AFRICAN AMERICAN: 9
GFR NON-AFRICAN AMERICAN: 7 ML/MIN/1.73
GLUCOSE BLD-MCNC: 166 MG/DL (ref 74–99)
HCT VFR BLD CALC: 23.4 % (ref 37–54)
HEMOGLOBIN: 7.1 G/DL (ref 12.5–16.5)
INR BLD: 1.3
MAGNESIUM: 2.5 MG/DL (ref 1.6–2.6)
MCH RBC QN AUTO: 29.6 PG (ref 26–35)
MCHC RBC AUTO-ENTMCNC: 30.3 % (ref 32–34.5)
MCV RBC AUTO: 97.5 FL (ref 80–99.9)
METER GLUCOSE: 221 MG/DL (ref 74–99)
METER GLUCOSE: 260 MG/DL (ref 74–99)
METER GLUCOSE: 324 MG/DL (ref 74–99)
METER GLUCOSE: 325 MG/DL (ref 74–99)
PDW BLD-RTO: 15.9 FL (ref 11.5–15)
PHOSPHORUS: 7.2 MG/DL (ref 2.5–4.5)
PLATELET # BLD: 202 E9/L (ref 130–450)
PMV BLD AUTO: 10.4 FL (ref 7–12)
POTASSIUM SERPL-SCNC: 4.2 MMOL/L (ref 3.5–5)
PROTHROMBIN TIME: 15.8 SEC (ref 9.3–12.4)
RBC # BLD: 2.4 E12/L (ref 3.8–5.8)
SODIUM BLD-SCNC: 138 MMOL/L (ref 132–146)
WBC # BLD: 4.8 E9/L (ref 4.5–11.5)

## 2020-12-06 PROCEDURE — 2580000003 HC RX 258: Performed by: PHYSICIAN ASSISTANT

## 2020-12-06 PROCEDURE — 85730 THROMBOPLASTIN TIME PARTIAL: CPT

## 2020-12-06 PROCEDURE — 6360000002 HC RX W HCPCS: Performed by: NURSE PRACTITIONER

## 2020-12-06 PROCEDURE — 2580000003 HC RX 258: Performed by: INTERNAL MEDICINE

## 2020-12-06 PROCEDURE — 85384 FIBRINOGEN ACTIVITY: CPT

## 2020-12-06 PROCEDURE — 36415 COLL VENOUS BLD VENIPUNCTURE: CPT

## 2020-12-06 PROCEDURE — 85027 COMPLETE CBC AUTOMATED: CPT

## 2020-12-06 PROCEDURE — 83540 ASSAY OF IRON: CPT

## 2020-12-06 PROCEDURE — 2060000000 HC ICU INTERMEDIATE R&B

## 2020-12-06 PROCEDURE — 6370000000 HC RX 637 (ALT 250 FOR IP): Performed by: INTERNAL MEDICINE

## 2020-12-06 PROCEDURE — 82962 GLUCOSE BLOOD TEST: CPT

## 2020-12-06 PROCEDURE — 85610 PROTHROMBIN TIME: CPT

## 2020-12-06 PROCEDURE — 83735 ASSAY OF MAGNESIUM: CPT

## 2020-12-06 PROCEDURE — 80048 BASIC METABOLIC PNL TOTAL CA: CPT

## 2020-12-06 PROCEDURE — 6360000002 HC RX W HCPCS: Performed by: PHYSICIAN ASSISTANT

## 2020-12-06 PROCEDURE — 83550 IRON BINDING TEST: CPT

## 2020-12-06 PROCEDURE — 6370000000 HC RX 637 (ALT 250 FOR IP): Performed by: NURSE PRACTITIONER

## 2020-12-06 PROCEDURE — 6370000000 HC RX 637 (ALT 250 FOR IP): Performed by: PHYSICIAN ASSISTANT

## 2020-12-06 PROCEDURE — 94660 CPAP INITIATION&MGMT: CPT

## 2020-12-06 PROCEDURE — 84100 ASSAY OF PHOSPHORUS: CPT

## 2020-12-06 PROCEDURE — 2700000000 HC OXYGEN THERAPY PER DAY

## 2020-12-06 RX ORDER — SODIUM CHLORIDE 9 MG/ML
INJECTION, SOLUTION INTRAVENOUS CONTINUOUS
Status: DISCONTINUED | OUTPATIENT
Start: 2020-12-06 | End: 2020-12-09 | Stop reason: HOSPADM

## 2020-12-06 RX ADMIN — DEXAMETHASONE 6 MG: 4 TABLET ORAL at 08:40

## 2020-12-06 RX ADMIN — HEPARIN SODIUM 5000 UNITS: 10000 INJECTION INTRAVENOUS; SUBCUTANEOUS at 16:01

## 2020-12-06 RX ADMIN — SODIUM CHLORIDE: 9 INJECTION, SOLUTION INTRAVENOUS at 20:00

## 2020-12-06 RX ADMIN — CITALOPRAM HYDROBROMIDE 20 MG: 20 TABLET ORAL at 08:40

## 2020-12-06 RX ADMIN — CLONIDINE HYDROCHLORIDE 0.1 MG: 0.1 TABLET ORAL at 08:40

## 2020-12-06 RX ADMIN — Medication 4000 UNITS: at 08:41

## 2020-12-06 RX ADMIN — OYSTER SHELL CALCIUM WITH VITAMIN D 1 TABLET: 500; 200 TABLET, FILM COATED ORAL at 08:43

## 2020-12-06 RX ADMIN — INSULIN LISPRO 8 UNITS: 100 INJECTION, SOLUTION INTRAVENOUS; SUBCUTANEOUS at 10:50

## 2020-12-06 RX ADMIN — ARIPIPRAZOLE 2.5 MG: 5 TABLET ORAL at 08:40

## 2020-12-06 RX ADMIN — FAMOTIDINE 10 MG: 20 TABLET, FILM COATED ORAL at 08:40

## 2020-12-06 RX ADMIN — CLONIDINE HYDROCHLORIDE 0.1 MG: 0.1 TABLET ORAL at 16:04

## 2020-12-06 RX ADMIN — TRIAMCINOLONE ACETONIDE: 1 CREAM TOPICAL at 20:01

## 2020-12-06 RX ADMIN — FUROSEMIDE 20 MG: 20 TABLET ORAL at 16:04

## 2020-12-06 RX ADMIN — HEPARIN SODIUM 5000 UNITS: 10000 INJECTION INTRAVENOUS; SUBCUTANEOUS at 20:10

## 2020-12-06 RX ADMIN — DILTIAZEM HYDROCHLORIDE 240 MG: 240 CAPSULE, COATED, EXTENDED RELEASE ORAL at 08:41

## 2020-12-06 RX ADMIN — Medication 10 ML: at 08:41

## 2020-12-06 RX ADMIN — TRIAMCINOLONE ACETONIDE: 1 CREAM TOPICAL at 08:43

## 2020-12-06 RX ADMIN — FUROSEMIDE 20 MG: 20 TABLET ORAL at 08:45

## 2020-12-06 RX ADMIN — INSULIN LISPRO 6 UNITS: 100 INJECTION, SOLUTION INTRAVENOUS; SUBCUTANEOUS at 16:07

## 2020-12-06 RX ADMIN — INSULIN GLARGINE 32 UNITS: 100 INJECTION, SOLUTION SUBCUTANEOUS at 08:45

## 2020-12-06 RX ADMIN — INSULIN LISPRO 4 UNITS: 100 INJECTION, SOLUTION INTRAVENOUS; SUBCUTANEOUS at 07:00

## 2020-12-06 RX ADMIN — INSULIN LISPRO 4 UNITS: 100 INJECTION, SOLUTION INTRAVENOUS; SUBCUTANEOUS at 20:10

## 2020-12-06 RX ADMIN — OYSTER SHELL CALCIUM WITH VITAMIN D 1 TABLET: 500; 200 TABLET, FILM COATED ORAL at 20:02

## 2020-12-06 RX ADMIN — CLONIDINE HYDROCHLORIDE 0.1 MG: 0.1 TABLET ORAL at 19:59

## 2020-12-06 RX ADMIN — HEPARIN SODIUM 5000 UNITS: 10000 INJECTION INTRAVENOUS; SUBCUTANEOUS at 06:07

## 2020-12-06 RX ADMIN — Medication 10 ML: at 19:59

## 2020-12-06 ASSESSMENT — PAIN SCALES - GENERAL
PAINLEVEL_OUTOF10: 0
PAINLEVEL_OUTOF10: 0

## 2020-12-06 NOTE — CONSULTS
[facility] MD Ami Solis MD Kirstie Fielding, MD Orlan Chary, MD Nehemiah Spark, RUTH Patrick  Consultation  Patient's Name: Divya Session  6:18 PM  12/6/2020    Nephrologist: Ami Das MD     Reason for Consult: Acute kidney injury, advanced chronic kidney disease  Requesting Physician:  Alma Corona DO    Chief Complaint: Dyspnea    History Obtained From: Patient, chart    History of Present Ilness:    Mr. Franko Medeiros is a pleasant 56-year-old gent with a complicated past medical history that includes Schizoaffective disorder, diabetes, hypertension, peripheral arterial disease, advanced CKD, stage V with severe proteinuria, further evaluation of which with a biopsy has foregone in the past, COPD, former smoker. He is a resident [facility]. Due to his psychiatric disorder he has a court appointed guardian who is responsible for his medical decisions. He presented with progressive dyspnea, hypoxemia with O2 saturations in the 80s on room air at the [facility]. He was placed on a nonrebreather mask with improvement. Requiring BiPAP for adequate oxygenation, though notably he has chosen not to wear it much of his time here and in fact has been hypoxemic for much of that time as a result. Review of systems is very limited as he was obtunded at the time of presentation, lethargic, not willing to answer questions. He is more awake and alert and interactive at the time of my visit, though for the most part denies all complaints, even dyspnea when O2 saturations are in the 80s. BUN and creatinine presentation on 12/4 were 90 and 7.1, respectively, increasing to 114 and 7.7, expected, as of this morning. Recent laboratory studies show a BUN/creatinine of 63 and 6.6 (11/17), 50 and 4.5 (9/11).   Baseline creatinine over the past 3 months has been > 4.3 mg/dL, which represents a worsening from 6 months prior to that, though as noted above, he (CHOLECALCIFEROL) tablet 4,000 Units, Daily  citalopram (CELEXA) tablet 20 mg, Daily  cloNIDine (CATAPRES) tablet 0.1 mg, TID  triamcinolone (KENALOG) 0.1 % cream, BID  sodium chloride flush 0.9 % injection 10 mL, 2 times per day  sodium chloride flush 0.9 % injection 10 mL, PRN  acetaminophen (TYLENOL) tablet 650 mg, Q6H PRN    Or  acetaminophen (TYLENOL) suppository 650 mg, Q6H PRN  polyethylene glycol (GLYCOLAX) packet 17 g, Daily PRN  heparin (porcine) injection 5,000 Units, 3 times per day  famotidine (PEPCID) tablet 10 mg, Daily  glucose (GLUTOSE) 40 % oral gel 15 g, PRN  dextrose 50 % IV solution, PRN  glucagon (rDNA) injection 1 mg, PRN  dextrose 5 % solution, PRN  dexamethasone (DECADRON) tablet 6 mg, Daily  insulin lispro (HUMALOG) injection vial 0-12 Units, TID WC  insulin lispro (HUMALOG) injection vial 0-6 Units, Nightly  dilTIAZem (CARDIZEM CD) extended release capsule 240 mg, Daily  insulin glargine (LANTUS) injection vial 32 Units, Daily        Review of Systems:   Review of systems not obtained due to patient factors. Mild dementia, delirium, baseline psychiatric illness    Physical exam:   /71   Pulse 78   Temp 98.2 °F (36.8 °C) (Oral)   Resp 20   Ht 5' 10\" (1.778 m)   Wt 254 lb 4 oz (115.3 kg)   SpO2 95%   BMI 36.48 kg/m²   For COV ID-19 isolation protocol, I did not conduct physical examination. I talked with him by phone. I discussed his physical examination with his bedside RN.     Data:   Labs:  CBC with Differential:    Lab Results   Component Value Date    WBC 4.8 12/06/2020    RBC 2.40 12/06/2020    HGB 7.1 12/06/2020    HCT 23.4 12/06/2020     12/06/2020    MCV 97.5 12/06/2020    MCH 29.6 12/06/2020    MCHC 30.3 12/06/2020    RDW 15.9 12/06/2020    NRBC 0.0 11/17/2020    LYMPHOPCT 4.0 12/05/2020    MONOPCT 2.0 12/05/2020    BASOPCT 0.0 12/05/2020    MONOSABS 0.04 12/05/2020    LYMPHSABS 0.08 12/05/2020    EOSABS 0.00 12/05/2020    BASOSABS 0.00 12/05/2020     CMP: cardiomegaly possibly with mild vascular congestion   New bilateral mid lung infiltrates and greater density in right lung base may   be secondary to edema and/or multifocal pneumonia   Small right pleural effusion may be chronic or recurrent. There may be a   component of atelectasis in the right lung base. Assessment  1. Acute kidney injury superimposed on already advanced chronic kidney disease. This may at least in part represent progression of his underlying disease, which is associated with nephrotic range proteinuria, in which he has decided to forego further evaluation (if he does not wish biopsy). Prerenal azotemia may be playing a role given poor intake and increase insensible losses prior to his presentation. 2. Chronic kidney disease stage V, likely due entirely to diabetic nephropathy and advanced renal microvascular atherosclerotic disease in setting of known coronary and peripheral arterial disease, though he does have nephrotic range proteinuria, the serologic evaluation for which has been unrevealing, he does not wish to undergo biopsy. He has been at least recently minimally uremic, though he has been hypervolemic. 3. Anemia at least in part secondary to CKD  4. CO VID 19 pneumonia  5. Schizoaffective disorder, inhibits his ability to cooperate with medical recommendations    Recommendations  1. Stop diuretic therapy  2. IV normal saline at 100 cc/h  3. Check urine sodium, chloride, creatinine  4. check random urine protein creatinine ratio  5. Check renal ultrasound  6. Follow labs, UO  7. Avoid nephrotoxins  8. Urgent hemodialysis is not warranted at this point    I discussed with him that though he does not need dialysis urgently now, if renal function does not improve in the next several days he may be looking at needing that possibility to maintain his life.   We have discussed the possibility of needing to start dialysis on a chronic basis a number of times in the past.  Today he maintains what he is told me before: He does not wish to undergo dialysis when his kidneys finally failed. I discussed with his court-appointed legal guardian, Ms. Anne-Marie Tavarez, per phone. She notes that his quality of life typically, when not ill, is at a reasonable level and that usually he gets along pretty well, such that dialysis would not necessarily represent a significant downturn in his quality of life, normal cause inordinate suffering. That said, she does not believe she would be able to, and does not think it would be appropriate, to force him to do something that he does not wish to do. I agree on both accounts. For now, we will continue supportive care, give empiric IV normal saline drip to try, and see if we can improve his renal function in short order with for now, no plans, per his wishes, to proceed with renal replacement therapy should his kidneys fail entirely. Thank you for the opportunity to participate in the care of your pleasant patient. We look forward to following along with you.        Electronically signed by Manuel Joseph MD on 12/6/2020

## 2020-12-06 NOTE — PROGRESS NOTES
Subjective: The patient is awake and alert. No acute events overnight. Patient is doing significantly better than yesterday. Off BiPAP, currently on 8 L  Excited for lunch. Intermittent shortness of breath, much improved. Objective:    /68   Pulse 79   Temp 97.9 °F (36.6 °C) (Oral)   Resp 20   Ht 5' 10\" (1.778 m)   Wt 254 lb 4 oz (115.3 kg)   SpO2 93%   BMI 36.48 kg/m²     In: 240 [P.O.:240]  Out: -   In: 240   Out: -     General appearance: NAD, conversant  HEENT: AT/NC, MMM  Neck: FROM, supple  Lungs: Clear to auscultation  CV: RRR, no MRGs  Vasc: Radial pulses 2+  Abdomen: Soft, non-tender; no masses or HSM  Skin: no rash, lesions or ulcers  Psych: Alert and oriented to person, place and time  Neuro: Alert and interactive     Recent Labs     12/04/20  1832 12/05/20  0600 12/06/20  0310   WBC 3.4* 2.0* 4.8   HGB 8.0* 7.3* 7.1*   HCT 26.4* 25.2* 23.4*    155 202       Recent Labs     12/04/20  1832 12/05/20  0600 12/06/20  0310    139 138   K 3.8 4.3 4.2   CL 88* 88* 89*   CO2 37* 28 37*   BUN 90* 94* 114*   CREATININE 7.1* 7.2* 7.7*   CALCIUM 8.8 8.9 8.7       Assessment:    Active Problems:    Uncontrolled diabetes mellitus ([address]    Acute respiratory failure with hypoxia ([facility])    Hypoxia    COVID-19  Resolved Problems:    * No resolved hospital problems. *      Plan:    1: Hypoxia secondary to COVID-19  O2 requirement much improved from yesterday currently on 8 L nasal cannula  Remdesivir  Decadron 6 mg p.o. daily  Monitor oxygen  Pulmonology consulted  Blood cultures: No growth at 24 hours     2: Diabetes mellitus  Sliding scale changed to medium dose  Continue to monitor     3: CKD  Monitor creatinine/BNP  Today: increased to 7.7/114  Nephrology consulted     4: Hypertension  Continue home medications  Monitor     5:  Bi-Polar Disorder:  Continue home medications.     DVT prophylaxis  PT OT  Discharge plan          Fernando Vargas, RICKY - CNP  1:05 PM  12/6/2020  Above reviewed in conjunction with tomas GARCÍA - I agree  Pt Seen and Examined, questions answered by me   Sabra Lis made to HPI/PE/as deemed neccessary by me  Plan formulated in conjunction with me after discussion     Pratima Peoples MD

## 2020-12-06 NOTE — PROGRESS NOTES
Date: 12/5/2020    Time: 10:41 PM    Patient Placed On BIPAP/CPAP/ Non-Invasive Ventilation? No    If no must comment. Facial area red/color change? No           If YES are Blister/Lesion present? No   If yes must notify nursing staff  BIPAP/CPAP skin barrier? No    Skin barrier type:n/a       Comments: Patient is not ready to go on Bipap machine, he stated that he needed crackers and cookies before he could go to bed. Machine in room if needed.         Santy Watt

## 2020-12-06 NOTE — PROGRESS NOTES
Date: 12/6/2020    Time: 12:05 AM    Patient Placed On BIPAP/CPAP/ Non-Invasive Ventilation? No    If no must comment. Facial area red/color change? No           If YES are Blister/Lesion present? No   If yes must notify nursing staff  BIPAP/CPAP skin barrier? No    Skin barrier type:na       Comments: Pt now refusing the BiPAP machine, stated that he is fine with his cannula. RN aware at this time that he if refusing. Machine remains in room if pt changes his mind.         Clementine Humphrey

## 2020-12-07 LAB
ABO/RH: NORMAL
ALBUMIN SERPL-MCNC: 3.1 G/DL (ref 3.5–5.2)
ALP BLD-CCNC: 40 U/L (ref 40–129)
ALT SERPL-CCNC: 15 U/L (ref 0–40)
ANION GAP SERPL CALCULATED.3IONS-SCNC: 11 MMOL/L (ref 7–16)
ANTIBODY SCREEN: NORMAL
APTT: 29.8 SEC (ref 24.5–35.1)
AST SERPL-CCNC: 16 U/L (ref 0–39)
BILIRUB SERPL-MCNC: 0.3 MG/DL (ref 0–1.2)
BLOOD BANK DISPENSE STATUS: NORMAL
BLOOD BANK PRODUCT CODE: NORMAL
BPU ID: NORMAL
BUN BLDV-MCNC: 125 MG/DL (ref 8–23)
CALCIUM SERPL-MCNC: 8.6 MG/DL (ref 8.6–10.2)
CHLORIDE BLD-SCNC: 89 MMOL/L (ref 98–107)
CO2: 36 MMOL/L (ref 22–29)
CREAT SERPL-MCNC: 8 MG/DL (ref 0.7–1.2)
DESCRIPTION BLOOD BANK: NORMAL
FIBRINOGEN: 306 MG/DL (ref 225–540)
GFR AFRICAN AMERICAN: 8
GFR NON-AFRICAN AMERICAN: 7 ML/MIN/1.73
GLUCOSE BLD-MCNC: 231 MG/DL (ref 74–99)
HCT VFR BLD CALC: 23 % (ref 37–54)
HEMOGLOBIN: 6.7 G/DL (ref 12.5–16.5)
INR BLD: 1.3
MAGNESIUM: 2.3 MG/DL (ref 1.6–2.6)
MCH RBC QN AUTO: 29 PG (ref 26–35)
MCHC RBC AUTO-ENTMCNC: 29.1 % (ref 32–34.5)
MCV RBC AUTO: 99.6 FL (ref 80–99.9)
METER GLUCOSE: 238 MG/DL (ref 74–99)
METER GLUCOSE: 291 MG/DL (ref 74–99)
METER GLUCOSE: 309 MG/DL (ref 74–99)
METER GLUCOSE: 420 MG/DL (ref 74–99)
PDW BLD-RTO: 15.9 FL (ref 11.5–15)
PHOSPHORUS: 6.7 MG/DL (ref 2.5–4.5)
PLATELET # BLD: 161 E9/L (ref 130–450)
PMV BLD AUTO: 9.6 FL (ref 7–12)
POTASSIUM SERPL-SCNC: 4.1 MMOL/L (ref 3.5–5)
PROTHROMBIN TIME: 14.9 SEC (ref 9.3–12.4)
RBC # BLD: 2.31 E12/L (ref 3.8–5.8)
SODIUM BLD-SCNC: 136 MMOL/L (ref 132–146)
TOTAL PROTEIN: 6.5 G/DL (ref 6.4–8.3)
WBC # BLD: 3 E9/L (ref 4.5–11.5)

## 2020-12-07 PROCEDURE — 97165 OT EVAL LOW COMPLEX 30 MIN: CPT

## 2020-12-07 PROCEDURE — 86850 RBC ANTIBODY SCREEN: CPT

## 2020-12-07 PROCEDURE — 2500000003 HC RX 250 WO HCPCS: Performed by: INTERNAL MEDICINE

## 2020-12-07 PROCEDURE — P9016 RBC LEUKOCYTES REDUCED: HCPCS

## 2020-12-07 PROCEDURE — 80053 COMPREHEN METABOLIC PANEL: CPT

## 2020-12-07 PROCEDURE — 2580000003 HC RX 258: Performed by: INTERNAL MEDICINE

## 2020-12-07 PROCEDURE — 86901 BLOOD TYPING SEROLOGIC RH(D): CPT

## 2020-12-07 PROCEDURE — 85610 PROTHROMBIN TIME: CPT

## 2020-12-07 PROCEDURE — 2700000000 HC OXYGEN THERAPY PER DAY

## 2020-12-07 PROCEDURE — 6370000000 HC RX 637 (ALT 250 FOR IP): Performed by: PHYSICIAN ASSISTANT

## 2020-12-07 PROCEDURE — 94660 CPAP INITIATION&MGMT: CPT

## 2020-12-07 PROCEDURE — 86900 BLOOD TYPING SEROLOGIC ABO: CPT

## 2020-12-07 PROCEDURE — 6360000002 HC RX W HCPCS: Performed by: INTERNAL MEDICINE

## 2020-12-07 PROCEDURE — 2060000000 HC ICU INTERMEDIATE R&B

## 2020-12-07 PROCEDURE — 85730 THROMBOPLASTIN TIME PARTIAL: CPT

## 2020-12-07 PROCEDURE — 86923 COMPATIBILITY TEST ELECTRIC: CPT

## 2020-12-07 PROCEDURE — 83735 ASSAY OF MAGNESIUM: CPT

## 2020-12-07 PROCEDURE — 85027 COMPLETE CBC AUTOMATED: CPT

## 2020-12-07 PROCEDURE — 97161 PT EVAL LOW COMPLEX 20 MIN: CPT

## 2020-12-07 PROCEDURE — 97530 THERAPEUTIC ACTIVITIES: CPT

## 2020-12-07 PROCEDURE — 84100 ASSAY OF PHOSPHORUS: CPT

## 2020-12-07 PROCEDURE — 85384 FIBRINOGEN ACTIVITY: CPT

## 2020-12-07 PROCEDURE — 6360000002 HC RX W HCPCS: Performed by: PHYSICIAN ASSISTANT

## 2020-12-07 PROCEDURE — 36415 COLL VENOUS BLD VENIPUNCTURE: CPT

## 2020-12-07 PROCEDURE — 6360000002 HC RX W HCPCS: Performed by: NURSE PRACTITIONER

## 2020-12-07 PROCEDURE — 6370000000 HC RX 637 (ALT 250 FOR IP): Performed by: INTERNAL MEDICINE

## 2020-12-07 PROCEDURE — 6370000000 HC RX 637 (ALT 250 FOR IP): Performed by: NURSE PRACTITIONER

## 2020-12-07 PROCEDURE — 36430 TRANSFUSION BLD/BLD COMPNT: CPT

## 2020-12-07 PROCEDURE — 82962 GLUCOSE BLOOD TEST: CPT

## 2020-12-07 PROCEDURE — 2580000003 HC RX 258: Performed by: PHYSICIAN ASSISTANT

## 2020-12-07 RX ORDER — CALCIUM ACETATE 667 MG/1
1334 CAPSULE ORAL
Status: DISCONTINUED | OUTPATIENT
Start: 2020-12-07 | End: 2020-12-09 | Stop reason: HOSPADM

## 2020-12-07 RX ORDER — 0.9 % SODIUM CHLORIDE 0.9 %
20 INTRAVENOUS SOLUTION INTRAVENOUS ONCE
Status: COMPLETED | OUTPATIENT
Start: 2020-12-07 | End: 2020-12-07

## 2020-12-07 RX ORDER — VITAMIN B COMPLEX
1000 TABLET ORAL DAILY
Status: DISCONTINUED | OUTPATIENT
Start: 2020-12-07 | End: 2020-12-09 | Stop reason: HOSPADM

## 2020-12-07 RX ADMIN — Medication 10 ML: at 20:14

## 2020-12-07 RX ADMIN — ARIPIPRAZOLE 2.5 MG: 5 TABLET ORAL at 09:15

## 2020-12-07 RX ADMIN — EPOETIN ALFA-EPBX 6000 UNITS: 10000 INJECTION, SOLUTION INTRAVENOUS; SUBCUTANEOUS at 17:09

## 2020-12-07 RX ADMIN — INSULIN LISPRO 12 UNITS: 100 INJECTION, SOLUTION INTRAVENOUS; SUBCUTANEOUS at 13:16

## 2020-12-07 RX ADMIN — Medication 10 ML: at 09:18

## 2020-12-07 RX ADMIN — INSULIN LISPRO 4 UNITS: 100 INJECTION, SOLUTION INTRAVENOUS; SUBCUTANEOUS at 20:15

## 2020-12-07 RX ADMIN — HEPARIN SODIUM 5000 UNITS: 10000 INJECTION INTRAVENOUS; SUBCUTANEOUS at 06:11

## 2020-12-07 RX ADMIN — CLONIDINE HYDROCHLORIDE 0.1 MG: 0.1 TABLET ORAL at 17:08

## 2020-12-07 RX ADMIN — SODIUM CHLORIDE 20 ML: 9 INJECTION, SOLUTION INTRAVENOUS at 06:44

## 2020-12-07 RX ADMIN — Medication 4000 UNITS: at 09:15

## 2020-12-07 RX ADMIN — INSULIN GLARGINE 32 UNITS: 100 INJECTION, SOLUTION SUBCUTANEOUS at 12:00

## 2020-12-07 RX ADMIN — INSULIN LISPRO 4 UNITS: 100 INJECTION, SOLUTION INTRAVENOUS; SUBCUTANEOUS at 06:30

## 2020-12-07 RX ADMIN — HEPARIN SODIUM 5000 UNITS: 10000 INJECTION INTRAVENOUS; SUBCUTANEOUS at 16:30

## 2020-12-07 RX ADMIN — CITALOPRAM HYDROBROMIDE 20 MG: 20 TABLET ORAL at 09:15

## 2020-12-07 RX ADMIN — TRIAMCINOLONE ACETONIDE: 1 CREAM TOPICAL at 09:18

## 2020-12-07 RX ADMIN — DEXAMETHASONE 6 MG: 4 TABLET ORAL at 09:15

## 2020-12-07 RX ADMIN — CLONIDINE HYDROCHLORIDE 0.1 MG: 0.1 TABLET ORAL at 09:15

## 2020-12-07 RX ADMIN — DILTIAZEM HYDROCHLORIDE 240 MG: 240 CAPSULE, COATED, EXTENDED RELEASE ORAL at 09:16

## 2020-12-07 RX ADMIN — FAMOTIDINE 10 MG: 20 TABLET, FILM COATED ORAL at 09:15

## 2020-12-07 RX ADMIN — CALCIUM ACETATE 1334 MG: 667 CAPSULE ORAL at 17:09

## 2020-12-07 RX ADMIN — HEPARIN SODIUM 5000 UNITS: 10000 INJECTION INTRAVENOUS; SUBCUTANEOUS at 21:49

## 2020-12-07 RX ADMIN — CLONIDINE HYDROCHLORIDE 0.1 MG: 0.1 TABLET ORAL at 20:13

## 2020-12-07 RX ADMIN — TRIAMCINOLONE ACETONIDE: 1 CREAM TOPICAL at 20:13

## 2020-12-07 RX ADMIN — OYSTER SHELL CALCIUM WITH VITAMIN D 1 TABLET: 500; 200 TABLET, FILM COATED ORAL at 09:16

## 2020-12-07 RX ADMIN — INSULIN LISPRO 6 UNITS: 100 INJECTION, SOLUTION INTRAVENOUS; SUBCUTANEOUS at 17:30

## 2020-12-07 ASSESSMENT — PAIN SCALES - GENERAL
PAINLEVEL_OUTOF10: 0

## 2020-12-07 NOTE — PROGRESS NOTES
Message sent to Dr. Elciia Luna via [employer], regarding patients BUN and Cr levels. Awaiting response at this time.     Electronically signed by Gayle Ernandez on 12/7/20 at 6:58 AM EST

## 2020-12-07 NOTE — PROGRESS NOTES
Received response from Dr. Meghann Morris via [employer]  No new orders at this time.     Electronically signed by Veronique Urbina on 12/7/20 at 7:00 AM EST

## 2020-12-07 NOTE — PROGRESS NOTES
Appetite: good.  100%  Last BM: 12/7/2020  Diarrhea/nausea: no  SOB:no  SMI: yes  Cough: no  Oxygen: 6L 02, 98%  Incontinent: no  Out of bed: urnal at bedside  Swelling: no  Dizziness: no complaints of  Glasses/ hearing aids / dentures: reading glasses, not with patient

## 2020-12-07 NOTE — PROGRESS NOTES
Attempted to call patient's legal guardian in regards to obtaining consent. Call forwarded to agency and stated they will call back once they get into contact with legal guardian. Awaiting call back at this time.     Electronically signed by Lamin Cerrato on 12/7/20 at 4:27 AM EST

## 2020-12-07 NOTE — PROGRESS NOTES
Physical Therapy    Facility/Department: [facility]  Initial Assessment    NAME: Estelle Gaona  : 1960  MRN: [id number]    Date of Service: 2020      Patient Diagnosis(es): The primary encounter diagnosis was Pneumonia due to COVID-19 virus. A diagnosis of Respiratory distress was also pertinent to this visit. has a past medical history of Anemia in chronic kidney disease, Arthropathy, unspecified, Below knee amputation ([address] Bifascicular block, Bipolar disorder, unspecified ([address] Cellulitis, CHF (congestive heart failure) ([address] Chronic kidney disease, stage 5 ([address] COPD (chronic obstructive pulmonary disease) ([address] Diabetes mellitus ([address] Fascicular block, Generalized muscle weakness, Hyperlipidemia, Hypertension, Intracranial injury without loss of consciousness ([address] Osteoporosis, Peripheral vascular disease ([address] Personality disorder ([address] Presence of intraocular lens, Right bundle branch block, Schizoaffective disorder ([address] Splenomegaly, Unspecified age-related cataract, Venous insufficiency (chronic) (peripheral), and Vitamin D deficiency. has a past surgical history that includes Upper gastrointestinal endoscopy (N/A, 2020). Referring Provider:  MARTIN Gunn       Evaluating Therapist: Gilda SHEFFIELD      Room #:612  DIAGNOSIS: Acute Respiratory failure  Additional Pertinent History:COPD, R AKA  PRECAUTIONS: falls, O2, droplet plus isolation    Social:  Pt admitted from SNF. Pt questionable historian. States he transfers to w./c on his own by placing chair in front of him, standing and then turning to sit. Does not wear prosthesis. Initial Evaluation  Date: 20 Treatment      Short Term/ Long Term   Goals   Was pt agreeable to Eval/treatment?  yes     Does pt have pain? no     Bed Mobility  Rolling: SBA  Supine to sit: SBA  Sit to supine: SBA  Scooting: SBA  supervision   Transfers Sit to stand: mod assist  Stand to sit: mod assist  Stand pivot: NT Min assist   Ambulation    NT  N/A   Stair Negotiation  Ascended and descended  NT   N/A   LE strength     3/5     3+/5   balance      Sitting balance SBA     AM-PAC Raw score               12/24         Pt is alert and grossly oriented. Some confusion noted. Flight of thought  LE ROM: WFL  Endurance: fair     ASSESSMENT  Pt displays functional ability as noted in the objective portion of this evaluation. Patient education  Pt educated on PT objectives    Patient response to education:   Pt verbalized understanding Pt demonstrated skill Pt requires further education in this area   no         ASSESSMENT:    Comments:  Pt sat up to edge of bed SBA. Attempted transfer to chair. Pt states he puts w/c in front of him, then stands and turns to sit. Pt mod assist to stand but unable to turn to chair. Decreased safety with attempt. Unsafe to attempt transfer to chair at this time. Pt returned to bed at end of session,.      Pt's/ family goals   1. None stated    Patient and or family understand(s) diagnosis, prognosis, and plan of care. no    PLAN:    PLAN OF CARE:    Current Treatment Recommendations     [x] Strengthening     [] ROM   [x] Balance Training   [x] Endurance Training   [x] Transfer Training   [] Gait Training   [] Stair Training   [] Positioning   [x] Safety and Education Training   [x] Patient/Caregiver Education   [] HEP  [] Other     Frequency of treatments: 2-5x/week x 7 .days    Time in  115  Time out  127      Evaluation Time includes thorough review of current medical information, gathering information on past medical history/social history and prior level of function, completion of standardized testing/informal observation of tasks, assessment of data and education on plan of care and goals.     CPT codes:  [x] Low Complexity PT evaluation 99563  [] Moderate Complexity PT evaluation 49704  [] High Complexity PT evaluation 48703  [] PT Re-evaluation 30141  [] Gait training 38505 minutes  [] Manual therapy 24207 minutes  [] Therapeutic activities 36428 minutes  [] Therapeutic exercises 46368 minutes  [] Neuromuscular reeducation 61438 minutes     Gloria Ledesma PT [id number]

## 2020-12-07 NOTE — PROGRESS NOTES
Received call back from legal guardian. Obtained consent and copy is inside patients chart.     Electronically signed by Daniela Saldaña on 12/7/20 at 4:52 AM EST

## 2020-12-07 NOTE — PROGRESS NOTES
Date: 12/7/2020    Time: 2:53 AM    Patient Placed On BIPAP/CPAP/ Non-Invasive Ventilation? No    If no must comment. Facial area red/color change? No           If YES are Blister/Lesion present? No   If yes must notify nursing staff  BIPAP/CPAP skin barrier? Yes    Skin barrier type:mepilexlite       Comments: Pt remains on BiPAP at this time. Mepilex in place.         Severo Gibbon      12/07/20 0252   NIV Type   Skin Protection for O2 Device Yes   Mode Bilevel   Mask Type Full face mask   Mask Size Large   Settings/Measurements   IPAP 16 cmH20   CPAP/EPAP 6 cmH2O   Rate Ordered 14   Resp 15   FiO2  40 %   Vt Exhaled 432 mL   Minute Volume 7.8 Liters   Mask Leak (lpm) 71 lpm   Comfort Level Fair   Using Accessory Muscles No   SpO2 91

## 2020-12-07 NOTE — CARE COORDINATION
Social Work/Discharge Planning:  Social Work consult noted. COVID positive 12/5. Patient admitted from [facility] at [facility] LOC. He is currently on 6 liters of oxygen. Called patient Amanda Shin (ph: [phone]) and completed initial assessment. Explained Social Work role. Roxanne plans for patient to return to [facility] at discharge. [facility] does not have an LTAC preference, if needed at discharge. Called liaison Liset Hadley at [facility] and left a message to confirm if patient can return to facility at discharge. Electronic N-17 in Epic and Ambulance form in soft chart. Will continue to follow and assist with discharge planning.   Electronically signed by JS Obrien on 12/7/2020 at 10:01 AM

## 2020-12-07 NOTE — PROGRESS NOTES
Date: 12/6/2020    Time: 11:31 PM    Patient Placed On BIPAP/CPAP/ Non-Invasive Ventilation? Yes    If no must comment. Facial area red/color change? No           If YES are Blister/Lesion present? No   If yes must notify nursing staff  BIPAP/CPAP skin barrier?   Yes    Skin barrier type:mepilexlite       Comments:        Adrianne Tracey

## 2020-12-07 NOTE — PROGRESS NOTES
[facility]  [address] [facility], [address]  [facility], [address] [facility]      Pulmonary Progress Note      SUBJECTIVE:  Currently on 6 li NC, dyspnea with minimal exertion and dry cough, feeling slightly better with breathing since in hospital. Minimal activity in room, used BIPAP last night.     OBJECTIVE    Medications    Continuous Infusions:   sodium chloride 100 mL/hr at 20 2000    dextrose         Scheduled Meds:   ARIPiprazole  2.5 mg Oral Daily    calcium-vitamin D  1 tablet Oral BID    vitamin D  4,000 Units Oral Daily    citalopram  20 mg Oral Daily    cloNIDine  0.1 mg Oral TID    triamcinolone   Topical BID    sodium chloride flush  10 mL Intravenous 2 times per day    heparin (porcine)  5,000 Units Subcutaneous 3 times per day    famotidine  10 mg Oral Daily    dexamethasone  6 mg Oral Daily    insulin lispro  0-12 Units Subcutaneous TID WC    insulin lispro  0-6 Units Subcutaneous Nightly    dilTIAZem  240 mg Oral Daily    insulin glargine  32 Units Subcutaneous Daily       PRN Meds:sodium chloride flush, acetaminophen **OR** acetaminophen, polyethylene glycol, glucose, dextrose, glucagon (rDNA), dextrose    Physical    VITALS:  BP (!) 165/84   Pulse 79   Temp 98.7 °F (37.1 °C) (Oral)   Resp 17   Ht 5' 10\" (1.778 m)   Wt 258 lb 2 oz (117.1 kg)   SpO2 100%   BMI 37.04 kg/m²     24HR INTAKE/OUTPUT:    No intake or output data in the 24 hours ending 20 0940    24HR PULSE OXIMETRY RANGE:    SpO2  Av.7 %  Min: 92 %  Max: 100 %    General appearance: alert, appears stated age and cooperative  Lungs: rhonchi bibasilar  Heart: regular rate and rhythm, S1, S2 normal, no murmur, click, rub or gallop  Abdomen: soft, non-tender; bowel sounds normal; no masses,  no organomegaly  Extremities: right BKA  Neurologic: Mental status: Alert, oriented, thought content appropriate    Data    CBC:   Recent Labs     20  0600 12/06/20  0310 12/07/20  0245   WBC 2.0* 4.8 3.0*   HGB 7.3* 7.1* 6.7*   HCT 25.2* 23.4* 23.0*   .4* 97.5 99.6    202 161       BMP:  Recent Labs     12/05/20  0600 12/06/20 0310 12/07/20  0245    138 136   K 4.3 4.2 4.1   CL 88* 89* 89*   CO2 28 37* 36*   PHOS  --  7.2* 6.7*   BUN 94* 114* 125*   CREATININE 7.2* 7.7* 8.0*    ALB:3,BILIDIR:3,BILITOT:3,ALKPHOS:3)@    PT/INR:   Recent Labs     12/05/20  0600 12/06/20 0310 12/07/20  0245   PROTIME 15.1* 15.8* 14.9*   INR 1.3 1.3 1.3       ABG:   Recent Labs     12/04/20 2225   PH 7.296*   PO2 132.0*   PCO2 84.0*   HCO3 40.0*   BE 11.6*   O2SAT 98.2   METHB 0.1   O2HB 96.6   COHB 1.5   O2CON 12.0   HHB 1.8   THB 8.6*     FiO2 : 40 %       Radiology/Other tests reviewed: none    Assessment:     Active Problems:    Uncontrolled diabetes mellitus (HCC)    Acute respiratory failure with hypoxia (HCC)    Hypoxia    COVID-19  Resolved Problems:    * No resolved hospital problems. *      Plan:       1. Cont with decadron  2. Unable to do remdesivir due to BENJY, observe respiratory function  3. Cont with oxygen, taper as tolerated, prone positioning if able to  4. NIPPV qhs and prn for sob  5. OOB to chair with assistance      Time at the bedside, reviewing labs and radiographs, reviewing notes and consultations, discussing with staff and family was more than 35 minutes. Thanks for letting us see this patient in consultation. Please contact us with any questions. Office [phone] or after hours through [email or web address], [phone].

## 2020-12-07 NOTE — PROGRESS NOTES
[facility] Roberto A. Birdena Hashimoto, MD Julianna Bunde, MD Constantine Cisneros, MD Steff Rodriguez, MD Earle Rust, CNP   Opal Palacios, RUTH  Progress Note    12/7/2020    SUBJECTIVE:   12/7: Awake, alert, interactive. Denies specific complaint besides re: the food (-) sob/patiño/cp/palp Appetite ok    PROBLEM LIST:    Active Problems:    Uncontrolled diabetes mellitus ([address]    Acute respiratory failure with hypoxia ([address]    Hypoxia    COVID-19  Resolved Problems:    * No resolved hospital problems.  *         DIET:    DIET CARB CONTROL;     MEDS (scheduled):    Calcium Acetate (Phos Binder)  1,334 mg Oral TID WC    vitamin D3  1,000 Units Oral Daily    epoetin juliane-epbx  6,000 Units Subcutaneous Once per day on Mon Wed Fri    ARIPiprazole  2.5 mg Oral Daily    vitamin D  4,000 Units Oral Daily    citalopram  20 mg Oral Daily    cloNIDine  0.1 mg Oral TID    triamcinolone   Topical BID    sodium chloride flush  10 mL Intravenous 2 times per day    heparin (porcine)  5,000 Units Subcutaneous 3 times per day    famotidine  10 mg Oral Daily    dexamethasone  6 mg Oral Daily    insulin lispro  0-12 Units Subcutaneous TID WC    insulin lispro  0-6 Units Subcutaneous Nightly    dilTIAZem  240 mg Oral Daily    insulin glargine  32 Units Subcutaneous Daily       MEDS (infusions):   sodium chloride 100 mL/hr at 12/06/20 2000    dextrose         MEDS (prn):  sodium chloride flush, acetaminophen **OR** acetaminophen, polyethylene glycol, glucose, dextrose, glucagon (rDNA), dextrose    PHYSICAL EXAM:     Patient Vitals for the past 24 hrs:   BP Temp Temp src Pulse Resp SpO2 Height Weight   12/07/20 0900 (!) 165/84 98.7 °F (37.1 °C) Oral 79 17 100 % -- --   12/07/20 0642 (!) 148/72 97.7 °F (36.5 °C) Axillary 64 16 95 % -- --   12/07/20 0615 122/76 97.7 °F (36.5 °C) Axillary 68 16 94 % -- --   12/07/20 0252 -- -- -- -- 15 -- -- --   12/07/20 0245 -- -- -- -- -- -- 5' 10\" (1.778 m) 258 lb 2 oz (117.1 kg)   12/06/20 2358 -- -- -- -- 18 -- -- --   12/06/20 2224 -- -- -- -- 19 -- -- --   12/06/20 1945 137/70 98 °F (36.7 °C) Oral 76 20 92 % -- --   12/06/20 1615 -- -- -- -- -- 95 % -- --   12/06/20 1600 131/71 98.2 °F (36.8 °C) Oral 78 20 94 % -- --   @    No intake or output data in the 24 hours ending 12/07/20 1409      Wt Readings from Last 3 Encounters:   12/07/20 258 lb 2 oz (117.1 kg)   11/17/20 278 lb (126.1 kg)   09/11/20 274 lb 9.6 oz (124.6 kg)       PE  Per COVID-19 isolation protocol, I did not conduct physical examination, though did discuss his physical examination with his bedside RN. DATA:    Recent Labs     12/05/20  0600 12/06/20  0310 12/07/20  0245   WBC 2.0* 4.8 3.0*   HGB 7.3* 7.1* 6.7*   HCT 25.2* 23.4* 23.0*   .4* 97.5 99.6    202 161     Recent Labs     12/05/20  0600 12/06/20  0310 12/07/20  0245    138 136   K 4.3 4.2 4.1   CL 88* 89* 89*   CO2 28 37* 36*   MG  --  2.5 2.3   PHOS  --  7.2* 6.7*   BUN 94* 114* 125*   CREATININE 7.2* 7.7* 8.0*   ALT 17  --  15   AST 27  --  16   BILIDIR 0.4*  --   --    BILITOT 0.5  --  0.3   ALKPHOS 46  --  40       Lab Results   Component Value Date    LABPROT 6.1 (H) 12/05/2020    LABPROT 6.1 12/05/2020    LABPROT 5.0 (H) 09/04/2020    LABPROT 5.0 09/04/2020       Assessment  1. Acute kidney injury superimposed on already advanced chronic kidney disease. This may at least in part represent progression of his underlying disease, which is associated with nephrotic range proteinuria, and which he has decided to forego further evaluation (i.e. he does not wish biopsy). Prerenal azotemia may be playing a role given poor intake and increased insensible losses prior to his presentation.   2. Chronic kidney disease stage V, likely due entirely to diabetic nephropathy and advanced renal microvascular atherosclerotic disease in setting of known coronary and peripheral arterial disease, though he does have nephrotic range proteinuria, the serologic evaluation for which has been unrevealing, he does not wish to undergo biopsy. He has been at least recently minimally uremic, though he has been hypervolemic. 3. Anemia at least in part secondary to CKD, severe. 4. COVID 19 pneumonia  5. Schizoaffective disorder, which inhibits his ability to cooperate with medical recommendations  6. 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 Cont to hold diuretic therapy  2. Cont IV normal saline at 100 cc/h until po intake improves  3. Avoid nephrotoxins  4. Urgent hemodialysis is not warranted at this point  5. Retacrit  6. PhosLo  7. Check PTH, prescribe calcitriol as warranted     On 12/6/2020 I discussed with him that though he does not need dialysis urgently now, though if renal function does not improve in the next several days he may be looking at needing that possibility to maintain his life. We have discussed the possibility of needing to start dialysis on a chronic basis a number of times in the past.  Today he maintains what he has told me previously: He does not wish to undergo dialysis when his kidneys finally fail. I discussed with his court-appointed legal guardian, Ms. Nara Pond, per phone. She notes that his quality of life typically, when not ill, is at a reasonable level and that usually he gets along pretty well, and as such dialysis would not necessarily represent a significant downturn in his quality of life, or cause inordinate suffering. That said, she does not believe she would be able to, and does not think it would be appropriate, to force him to do something that he does not wish to do.   I agree on both counts.     For now, we will continue supportive care, give empiric IV normal saline drip to try and see if we can improve his renal function and have no plans for now, per his wishes, to proceed with renal replacement therapy should his kidneys fail entirely.       Electronically signed by Sherri Balderrama MD on 12/7/2020 at 2:09 PM

## 2020-12-07 NOTE — PROGRESS NOTES
Subjective: The patient is awake and alert. Essentially at his baseline  No acute events overnight. Off BiPAP, currently on 8 L  Intermittent shortness of breath, much improved. Objective:    BP (!) 165/84   Pulse 79   Temp 98.7 °F (37.1 °C) (Oral)   Resp 17   Ht 5' 10\" (1.778 m)   Wt 258 lb 2 oz (117.1 kg)   SpO2 100%   BMI 37.04 kg/m²     No intake/output data recorded. No intake/output data recorded. General appearance: NAD, conversant  HEENT: AT/NC, MMM  Neck: FROM, supple  Lungs: Coarse breath sounds are improving slowly  CV: RRR, no MRGs  Vasc: Radial pulses 2+  Abdomen: Soft, non-tender; no masses or HSM  Skin: no rash, lesions or ulcers  Psych: Alert and oriented to person, place and time  Neuro: Alert and interactive     Recent Labs     12/05/20  0600 12/06/20  0310 12/07/20  0245   WBC 2.0* 4.8 3.0*   HGB 7.3* 7.1* 6.7*   HCT 25.2* 23.4* 23.0*    202 161       Recent Labs     12/05/20  0600 12/06/20  0310 12/07/20  0245    138 136   K 4.3 4.2 4.1   CL 88* 89* 89*   CO2 28 37* 36*   BUN 94* 114* 125*   CREATININE 7.2* 7.7* 8.0*   CALCIUM 8.9 8.7 8.6       Assessment:    Active Problems:    Uncontrolled diabetes mellitus (HCC)    Acute respiratory failure with hypoxia (HCC)    Hypoxia    COVID-19  Resolved Problems:    * No resolved hospital problems. *      Plan:    1: Hypoxia secondary to COVID-19  O2 requirement much improved from yesterday currently on 8 L nasal cannula  Remdesivir  Decadron 6 mg p.o. daily  Monitor oxygen  Pulmonology consulted  Blood cultures: No growth at 24 hours     2: Diabetes mellitus  Sliding scale changed to medium dose  Continue to monitor     3: CKD-BUN/creatinine markedly elevated though patient is not on dialysis  Monitor creatinine/BNP  Today: increased to 7.7/114  Nephrology following     4: Hypertension  Continue home medications  Monitor     5:  Bi-Polar Disorder:  Continue home medications.     DVT prophylaxis  PT OT  Discharge Rudy Jade MD  12:02 PM  12/7/2020

## 2020-12-07 NOTE — PROGRESS NOTES
Notified Dr. Elio Hess regarding patients AM labs. New orders to administer 1 Unit PRBCs. Also informed Sheik of patient's elevated BUN/Cr level and concern for notifying Dr. Marshall Cleary. This nurse was told to wait until AM to notify.      Electronically signed by Eben Grimes on 12/7/20 at 4:18 AM EST

## 2020-12-07 NOTE — PROGRESS NOTES
Occupational Therapy  OCCUPATIONAL THERAPY INITIAL EVALUATION      Date:2020  Patient Name: Antolin Peres  MRN: [id number]  : 1960  Room: [address] [place] [address]A    Referring Provider: MARTIN Mirza    Evaluating OT: Pari Blanco OTR/L [id number]    AM-PAC Daily Activity Raw Score: 15/24    Recommended Adaptive Equipment: TBD    Diagnosis: acute respiratory failure with hypoxia. Pt presents to ED from facility with SOB. Pertinent Medical History: HTN, DM, COPD, CHF, osteoporosis, R AKA, schizoaffective disorder   Precautions:  Falls, droplet plus isolation COVID, R AKA, O2     Home Living: Pt lives at [facility]. Prior Level of Function: Pt is a questionable historian, reports staff assist with toileting and bathing, Mod I with dressing and grooming, staff assist with IADLs; completed functional mobility with WC at [facility], transfers at [facility] per pt report. 3-4L O2 at facility. Pain Level: no reported pain    Cognition: A&O: 3-/. Pt is alert and oriented but easily confused and distracted throughout session.    Problem solving:  fair   Judgement/safety:  poor     Functional Assessment:   Initial Eval Status  Date: 20 Treatment session:  Short Term Goals     Feeding Set up     Grooming Min A  Set up   UB Dressing Min A  Management of hospital gown  Set up   LB Dressing Max A  Donning/doffing L sock and shoe  Min A    Bathing Max A  Min A   Toileting Max A  Min A   Bed Mobility  Supine to sit: SBA  Scooting to HOB: Min A     Functional Transfers Attempted squat pivot transfer but pt unsafe to complete and not following proper instruction so task terminated  Min A   Functional Mobility NA     Balance Sitting: fair plus seated EOB    Standing: NA     Activity Tolerance Poor  5L O2 maintained >91% throughout  sitting anastasia x10-15 min with good minus balance during self care tasks   B UE strength 4-/5         Treatment: Patient educated on techniques for completion of ADL, safe functional transfers and functional mobility. Patient required cues for follow through with proper hand/foot placement, pacing, safety, attention, sequencing and technique in bed mobility, functional transfers and LB dressing in preparation for maximum independence in all self care tasks. Pt trained on use of B UEs for assist properly in scooting transfers to [facility]. Pt trained on use of B UEs for repositioning and adjustment in hospital bed.      Hand Dominance: Right [x]  Left []   Strength ROM Additional Info:    RUE  4-/5 WFL good  and FMC/dexterity noted during ADL tasks     LUE 4-/5 WFL good  and FMC/dexterity noted during ADL tasks         Hearing: WFL   Vision: WFL   Tone: WFL   Edema: none                            Long Term Goal (1-3 wks): Pt will maximize functional performance in all self care tasks/functional transfers with good follow through of all trained techniques for safe transition to next level of care    Assessment of current deficits   Functional mobility [x]  ADLs [x] Strength [x]  Cognition []  Functional transfers  [x] IADLs [x] Safety Awareness [x]  Endurance [x]  Fine Motor Coordination [] Balance [x] Vision/perception [] Sensation []   Gross Motor Coordination [] ROM [] Delirium []                  Motor Control []    Plan of Care: 1-4 days/week for 1-2 weeks PRN   [x]ADL retraining/adaptive techniques and AE recommendations to increase functional independence within precautions                    [x]Energy conservation techniques to improve tolerance for ADL/IADLs  [x]Functional transfer training/DME recommendations for increased independence, safety and fall prevention         [x]Patient/family education to increase safety and functional independence during daily routine          [x]Environmental modifications for safe mobility and completion of ADLs                             []Cognitive retraining to improve problem solving skills & safe participation in ADLs/IADLs     []Sensory re-education techniques to improve extremity awareness, maintain skin integrity and improve hand function                             []Visual/Perceptual retraining to improve body awareness and safety during transfers and ADLs  []Splinting/positioning needs to maintain joint/skin integrity and contracture prevention  [x]Therapeutic activity to improve functional performance during ADLs                                        [x]Therapeutic exercise to improve tolerance and functional strength for ADLs   [x]Balance retraining/tolerance tasks for facilitation of postural control with dynamic challenges during ADLs  []Neuromuscular re-education to facilitate righting/equilibrium reactions, midline orientation, scapular stability/mobility, normalize muscle tone and facilitate active functional movement                        []Delirium prevention/treatment    [x]Positioning to improve functional independence and decrease risk of skin breakdown  []Other:     Rehab Potential: Good for established goals     Patient/Family Goal: To get home. Patient and/or family were instructed on functional diagnosis, prognosis/goals and OT plan of care. Pt verbalized fair understanding. Upon arrival, patient supine in bed. At end of session, patient supine in bed with call light and phone within reach, all lines and tubes intact. Pt would benefit from continued skilled OT to increase safety and independence with completion of ADL/IADL tasks for functional independence and quality of life.  Bed/chair alarm: ON    Low Evaluation 33465  Time In: 1308   Time Out: 1332       Min Units   Therapeutic Ex 21302     Therapeutic Activities 81020 [id number] 1   ADL/Self Care [id number] 3    Orthotic Management 42903     Neuro Re-Ed 69511     TOTAL TIMED TREATMENT 10 1           Evaluation time includes thorough review of current medical information, gathering information on past medical history/social history and prior level of function, completion of

## 2020-12-08 VITALS
TEMPERATURE: 98.4 F | HEIGHT: 70 IN | RESPIRATION RATE: 17 BRPM | WEIGHT: 256.25 LBS | BODY MASS INDEX: 36.68 KG/M2 | SYSTOLIC BLOOD PRESSURE: 153 MMHG | DIASTOLIC BLOOD PRESSURE: 87 MMHG | HEART RATE: 74 BPM | OXYGEN SATURATION: 95 %

## 2020-12-08 LAB
ANION GAP SERPL CALCULATED.3IONS-SCNC: 11 MMOL/L (ref 7–16)
APTT: 27.2 SEC (ref 24.5–35.1)
BUN BLDV-MCNC: 125 MG/DL (ref 8–23)
CALCIUM SERPL-MCNC: 8.4 MG/DL (ref 8.6–10.2)
CHLORIDE BLD-SCNC: 92 MMOL/L (ref 98–107)
CO2: 33 MMOL/L (ref 22–29)
CREAT SERPL-MCNC: 7.2 MG/DL (ref 0.7–1.2)
FERRITIN: 511 NG/ML
FIBRINOGEN: 293 MG/DL (ref 225–540)
GFR AFRICAN AMERICAN: 9
GFR NON-AFRICAN AMERICAN: 8 ML/MIN/1.73
GLUCOSE BLD-MCNC: 177 MG/DL (ref 74–99)
HCT VFR BLD CALC: 25.8 % (ref 37–54)
HEMOGLOBIN: 8 G/DL (ref 12.5–16.5)
INR BLD: 1.2
IRON SATURATION: 22 % (ref 20–55)
IRON: 53 MCG/DL (ref 59–158)
MAGNESIUM: 2.2 MG/DL (ref 1.6–2.6)
MCH RBC QN AUTO: 29.9 PG (ref 26–35)
MCHC RBC AUTO-ENTMCNC: 31 % (ref 32–34.5)
MCV RBC AUTO: 96.3 FL (ref 80–99.9)
METER GLUCOSE: 159 MG/DL (ref 74–99)
METER GLUCOSE: 176 MG/DL (ref 74–99)
METER GLUCOSE: 233 MG/DL (ref 74–99)
PARATHYROID HORMONE INTACT: 217 PG/ML (ref 15–65)
PDW BLD-RTO: 15.8 FL (ref 11.5–15)
PHOSPHORUS: 5.6 MG/DL (ref 2.5–4.5)
PLATELET # BLD: 189 E9/L (ref 130–450)
PMV BLD AUTO: 10.2 FL (ref 7–12)
POTASSIUM SERPL-SCNC: 5.1 MMOL/L (ref 3.5–5)
PROTHROMBIN TIME: 14.3 SEC (ref 9.3–12.4)
RBC # BLD: 2.68 E12/L (ref 3.8–5.8)
SODIUM BLD-SCNC: 136 MMOL/L (ref 132–146)
TOTAL IRON BINDING CAPACITY: 240 MCG/DL (ref 250–450)
WBC # BLD: 3.1 E9/L (ref 4.5–11.5)

## 2020-12-08 PROCEDURE — 85384 FIBRINOGEN ACTIVITY: CPT

## 2020-12-08 PROCEDURE — 2700000000 HC OXYGEN THERAPY PER DAY

## 2020-12-08 PROCEDURE — 6370000000 HC RX 637 (ALT 250 FOR IP): Performed by: INTERNAL MEDICINE

## 2020-12-08 PROCEDURE — 2500000003 HC RX 250 WO HCPCS: Performed by: INTERNAL MEDICINE

## 2020-12-08 PROCEDURE — 94660 CPAP INITIATION&MGMT: CPT

## 2020-12-08 PROCEDURE — 80048 BASIC METABOLIC PNL TOTAL CA: CPT

## 2020-12-08 PROCEDURE — 82962 GLUCOSE BLOOD TEST: CPT

## 2020-12-08 PROCEDURE — 6370000000 HC RX 637 (ALT 250 FOR IP): Performed by: NURSE PRACTITIONER

## 2020-12-08 PROCEDURE — 85027 COMPLETE CBC AUTOMATED: CPT

## 2020-12-08 PROCEDURE — 6370000000 HC RX 637 (ALT 250 FOR IP): Performed by: PHYSICIAN ASSISTANT

## 2020-12-08 PROCEDURE — 83735 ASSAY OF MAGNESIUM: CPT

## 2020-12-08 PROCEDURE — 83970 ASSAY OF PARATHORMONE: CPT

## 2020-12-08 PROCEDURE — 6360000002 HC RX W HCPCS: Performed by: PHYSICIAN ASSISTANT

## 2020-12-08 PROCEDURE — 36415 COLL VENOUS BLD VENIPUNCTURE: CPT

## 2020-12-08 PROCEDURE — 6360000002 HC RX W HCPCS: Performed by: NURSE PRACTITIONER

## 2020-12-08 PROCEDURE — 85610 PROTHROMBIN TIME: CPT

## 2020-12-08 PROCEDURE — 84100 ASSAY OF PHOSPHORUS: CPT

## 2020-12-08 PROCEDURE — 85730 THROMBOPLASTIN TIME PARTIAL: CPT

## 2020-12-08 RX ORDER — DEXAMETHASONE 6 MG/1
6 TABLET ORAL
Qty: 7 TABLET | Refills: 0 | Status: SHIPPED | OUTPATIENT
Start: 2020-12-08 | End: 2020-12-15

## 2020-12-08 RX ADMIN — CHOLECALCIFEROL TAB 25 MCG (1000 UNIT) 1000 UNITS: 25 TAB at 08:27

## 2020-12-08 RX ADMIN — DEXAMETHASONE 6 MG: 4 TABLET ORAL at 08:18

## 2020-12-08 RX ADMIN — HEPARIN SODIUM 5000 UNITS: 10000 INJECTION INTRAVENOUS; SUBCUTANEOUS at 18:20

## 2020-12-08 RX ADMIN — ARIPIPRAZOLE 2.5 MG: 5 TABLET ORAL at 08:19

## 2020-12-08 RX ADMIN — HEPARIN SODIUM 5000 UNITS: 10000 INJECTION INTRAVENOUS; SUBCUTANEOUS at 06:11

## 2020-12-08 RX ADMIN — CALCIUM ACETATE 1334 MG: 667 CAPSULE ORAL at 08:19

## 2020-12-08 RX ADMIN — FAMOTIDINE 10 MG: 20 TABLET, FILM COATED ORAL at 08:25

## 2020-12-08 RX ADMIN — INSULIN GLARGINE 32 UNITS: 100 INJECTION, SOLUTION SUBCUTANEOUS at 09:54

## 2020-12-08 RX ADMIN — INSULIN LISPRO 4 UNITS: 100 INJECTION, SOLUTION INTRAVENOUS; SUBCUTANEOUS at 18:21

## 2020-12-08 RX ADMIN — DILTIAZEM HYDROCHLORIDE 240 MG: 240 CAPSULE, COATED, EXTENDED RELEASE ORAL at 08:19

## 2020-12-08 RX ADMIN — Medication 4000 UNITS: at 08:18

## 2020-12-08 RX ADMIN — CALCIUM ACETATE 1334 MG: 667 CAPSULE ORAL at 16:23

## 2020-12-08 RX ADMIN — INSULIN LISPRO 2 UNITS: 100 INJECTION, SOLUTION INTRAVENOUS; SUBCUTANEOUS at 06:30

## 2020-12-08 RX ADMIN — CLONIDINE HYDROCHLORIDE 0.1 MG: 0.1 TABLET ORAL at 16:23

## 2020-12-08 RX ADMIN — CLONIDINE HYDROCHLORIDE 0.1 MG: 0.1 TABLET ORAL at 08:19

## 2020-12-08 RX ADMIN — CALCIUM ACETATE 1334 MG: 667 CAPSULE ORAL at 11:27

## 2020-12-08 RX ADMIN — INSULIN LISPRO 2 UNITS: 100 INJECTION, SOLUTION INTRAVENOUS; SUBCUTANEOUS at 11:30

## 2020-12-08 RX ADMIN — CITALOPRAM HYDROBROMIDE 20 MG: 20 TABLET ORAL at 08:19

## 2020-12-08 ASSESSMENT — PAIN SCALES - GENERAL
PAINLEVEL_OUTOF10: 0
PAINLEVEL_OUTOF10: 0

## 2020-12-08 NOTE — CARE COORDINATION
Social Work/Discharge Planning:  Received call from Alexandra Garcia at "[employer] stating patient can return to facility at discharge. Facility can accept patient COVID positive and no pre-cert needed to return. Will continue to follow.   Electronically signed by JS Patel on 12/8/2020 at 12:03 PM

## 2020-12-08 NOTE — PROGRESS NOTES
Contacted dr. Sancho Kumar via [employer] call service, regarding patient discharge. No new orders at this time.

## 2020-12-08 NOTE — PROGRESS NOTES
[facility] MD Delaney Cates, MD Iraida Saxena, MD Agustín Hobson, MD Lorraine Wolf, CNP   Opal Palacios, RUTH  Progress Note    12/8/2020    SUBJECTIVE:   12/7: Awake, alert, interactive. Denies specific complaint besides re: the food (-) sob/patiño/cp/palp Appetite ok  12/8:  Seen this am.  No new issues or events. Attempted to call, unsuccessfully, though per bedside RN doing well, eating and drinking well, comfortably breathing. Still no desire to undergo dialysis ever. PROBLEM LIST:    Active Problems:    Uncontrolled diabetes mellitus ([address]    Acute respiratory failure with hypoxia (HCC)    Hypoxia    COVID-19  Resolved Problems:    * No resolved hospital problems.  *         DIET:    DIET CARB CONTROL;     MEDS (scheduled):    Calcium Acetate (Phos Binder)  1,334 mg Oral TID WC    vitamin D3  1,000 Units Oral Daily    epoetin juliane-epbx  6,000 Units Subcutaneous Once per day on Mon Wed Fri    ARIPiprazole  2.5 mg Oral Daily    vitamin D  4,000 Units Oral Daily    citalopram  20 mg Oral Daily    cloNIDine  0.1 mg Oral TID    triamcinolone   Topical BID    sodium chloride flush  10 mL Intravenous 2 times per day    heparin (porcine)  5,000 Units Subcutaneous 3 times per day    famotidine  10 mg Oral Daily    dexamethasone  6 mg Oral Daily    insulin lispro  0-12 Units Subcutaneous TID WC    insulin lispro  0-6 Units Subcutaneous Nightly    dilTIAZem  240 mg Oral Daily    insulin glargine  32 Units Subcutaneous Daily       MEDS (infusions):   sodium chloride 100 mL/hr at 12/06/20 2000    dextrose         MEDS (prn):  sodium chloride flush, acetaminophen **OR** acetaminophen, polyethylene glycol, glucose, dextrose, glucagon (rDNA), dextrose    PHYSICAL EXAM:     Patient Vitals for the past 24 hrs:   BP Temp Temp src Pulse Resp SpO2 Height Weight   12/08/20 0815 (!) 144/87 98 °F (36.7 °C) Oral 76 18 99 % -- --   12/08/20 0415 -- -- -- -- 17 -- -- --   12/08/20 0215 -- -- -- -- -- -- 5' 10\" (1.778 m) 256 lb 4 oz (116.2 kg)   12/08/20 0150 -- -- -- -- 19 -- -- --   12/07/20 2045 -- -- -- -- 17 -- -- --   12/07/20 1945 (!) 144/69 98.8 °F (37.1 °C) Oral 74 18 97 % -- --   12/07/20 1707 (!) 155/82 98.5 °F (36.9 °C) Oral 79 17 95 % -- --   @      Intake/Output Summary (Last 24 hours) at 12/8/2020 1527  Last data filed at 12/8/2020 0840  Gross per 24 hour   Intake 200 ml   Output 250 ml   Net -50 ml         Wt Readings from Last 3 Encounters:   12/08/20 256 lb 4 oz (116.2 kg)   11/17/20 278 lb (126.1 kg)   09/11/20 274 lb 9.6 oz (124.6 kg)       PE  Per COVID-19 isolation protocol, I did not conduct physical examination, though did discuss his physical examination with his bedside RN. DATA:    Recent Labs     12/06/20 0310 12/07/20  0245 12/08/20  0230   WBC 4.8 3.0* 3.1*   HGB 7.1* 6.7* 8.0*   HCT 23.4* 23.0* 25.8*   MCV 97.5 99.6 96.3    161 189     Recent Labs     12/06/20 0310 12/07/20  0245 12/08/20  0230    136 136   K 4.2 4.1 5.1*   CL 89* 89* 92*   CO2 37* 36* 33*   MG 2.5 2.3 2.2   PHOS 7.2* 6.7* 5.6*   * 125* 125*   CREATININE 7.7* 8.0* 7.2*   ALT  --  15  --    AST  --  16  --    BILITOT  --  0.3  --    ALKPHOS  --  40  --        Lab Results   Component Value Date    LABPROT 6.1 (H) 12/05/2020    LABPROT 6.1 12/05/2020    LABPROT 5.0 (H) 09/04/2020    LABPROT 5.0 09/04/2020       Assessment  1. Acute kidney injury superimposed on already advanced chronic kidney disease. This may at least in part represent progression of his underlying disease, which is associated with nephrotic range proteinuria, and which he has decided to forego further evaluation (i.e. he does not wish biopsy). Prerenal azotemia may be playing a role given poor intake and increased insensible losses prior to his presentation.   2. Chronic kidney disease stage V, likely due entirely to diabetic nephropathy and advanced renal microvascular atherosclerotic disease in setting of known coronary and peripheral arterial disease, though he does have nephrotic range proteinuria, the serologic evaluation for which has been unrevealing, he does not wish to undergo biopsy. He has been at least recently minimally uremic, though he has been hypervolemic. 3. Anemia at least in part secondary to CKD, severe. 4. COVID 19 pneumonia  5. Schizoaffective disorder, which inhibits his ability to cooperate with medical recommendations  6. 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 Enc po  2. stop IVF  3. Avoid nephrotoxins  4. Retacrit  5. PhosLo  6. Check PTH, prescribe calcitriol as warranted     Comment  On 12/6/2020 I discussed with him that though he does not need dialysis urgently now, though if renal function does not improve in the next several days he may be looking at needing that possibility to maintain his life. We have discussed the possibility of needing to start dialysis on a chronic basis a number of times in the past.  Today he maintains what he has told me previously: He does not wish to undergo dialysis when his kidneys finally fail. I discussed with his court-appointed legal guardian, Ms. Talya Morel, per phone. She notes that his quality of life typically, when not ill, is at a reasonable level and that usually he gets along pretty well, and as such dialysis would not necessarily represent a significant downturn in his quality of life, or cause inordinate suffering. That said, she does not believe she would be able to, and does not think it would be appropriate, to force him to do something that he does not wish to do.   I agree on both counts.     For now, we will continue supportive care, give empiric IV normal saline drip to try and see if we can improve his renal function and have no plans for now, per his wishes, to proceed with renal replacement therapy should his kidneys fail entirely.       Electronically signed by Opal Weaver MD on 12/8/2020

## 2020-12-08 NOTE — PROGRESS NOTES
medications  Monitor     5: Bi-Polar Disorder:  Continue home medications.     DVT prophylaxis  PT OT  Discharge plan-patient is okay to discharge from medicine standpoint if okay with pulmonology on p.o.  Sandee Jeffers MD  12:58 PM  12/8/2020

## 2020-12-08 NOTE — PROGRESS NOTES
[facility]  [address], [address]  [facility], [facility]      Pulmonary Progress Note      SUBJECTIVE:  Currently on 5 li NC, dyspnea with minimal exertion and dry cough, feeling slightly better with breathing since in hospital. Minimal activity in room, used BIPAP last night.  Possible discharge today    OBJECTIVE    Medications    Continuous Infusions:   sodium chloride 100 mL/hr at 20 2000    dextrose         Scheduled Meds:   Calcium Acetate (Phos Binder)  1,334 mg Oral TID WC    vitamin D3  1,000 Units Oral Daily    epoetin juliane-epbx  6,000 Units Subcutaneous Once per day on     ARIPiprazole  2.5 mg Oral Daily    vitamin D  4,000 Units Oral Daily    citalopram  20 mg Oral Daily    cloNIDine  0.1 mg Oral TID    triamcinolone   Topical BID    sodium chloride flush  10 mL Intravenous 2 times per day    heparin (porcine)  5,000 Units Subcutaneous 3 times per day    famotidine  10 mg Oral Daily    dexamethasone  6 mg Oral Daily    insulin lispro  0-12 Units Subcutaneous TID WC    insulin lispro  0-6 Units Subcutaneous Nightly    dilTIAZem  240 mg Oral Daily    insulin glargine  32 Units Subcutaneous Daily       PRN Meds:sodium chloride flush, acetaminophen **OR** acetaminophen, polyethylene glycol, glucose, dextrose, glucagon (rDNA), dextrose    Physical    VITALS:  BP (!) 144/87   Pulse 76   Temp 98 °F (36.7 °C) (Oral)   Resp 18   Ht 5' 10\" (1.778 m)   Wt 256 lb 4 oz (116.2 kg)   SpO2 99%   BMI 36.77 kg/m²     24HR INTAKE/OUTPUT:      Intake/Output Summary (Last 24 hours) at 2020 1531  Last data filed at 2020 0840  Gross per 24 hour   Intake 200 ml   Output 250 ml   Net -50 ml       24HR PULSE OXIMETRY RANGE:    SpO2  Av %  Min: 95 %  Max: 99 %    General appearance: alert, appears stated age and cooperative  Lungs: rhonchi bibasilar  Heart: regular rate and rhythm, S1, S2 normal, no murmur, click, rub or gallop  Abdomen: soft, non-tender; bowel sounds normal; no masses,  no organomegaly  Extremities: right BKA  Neurologic: Mental status: Alert, oriented, thought content appropriate    Data    CBC:   Recent Labs     12/06/20 0310 12/07/20 0245 12/08/20 0230   WBC 4.8 3.0* 3.1*   HGB 7.1* 6.7* 8.0*   HCT 23.4* 23.0* 25.8*   MCV 97.5 99.6 96.3    161 189       BMP:  Recent Labs     12/06/20 0310 12/07/20 0245 12/08/20  0230    136 136   K 4.2 4.1 5.1*   CL 89* 89* 92*   CO2 37* 36* 33*   PHOS 7.2* 6.7* 5.6*   * 125* 125*   CREATININE 7.7* 8.0* 7.2*    ALB:3,BILIDIR:3,BILITOT:3,ALKPHOS:3)@    PT/INR:   Recent Labs     12/06/20 0310 12/07/20 0245 12/08/20 0230   PROTIME 15.8* 14.9* 14.3*   INR 1.3 1.3 1.2       ABG:   No results for input(s): PH, PO2, PCO2, HCO3, BE, O2SAT, METHB, O2HB, COHB, O2CON, HHB, THB in the last 72 hours. FiO2 : 40 %       Radiology/Other tests reviewed: none    Assessment:     Active Problems:    Uncontrolled diabetes mellitus (HCC)    Acute respiratory failure with hypoxia (HCC)    Hypoxia    COVID-19  Resolved Problems:    * No resolved hospital problems. *      Plan:       1. Can be discharged from pulmonary pov  2. Cont with decadron, can cont for a few days as out-pt  3. Unable to do remdesivir due to BENJY, observe respiratory function  4. Cont with oxygen, taper as tolerated, prone positioning if able to  5. OOB to chair with assistance      Time at the bedside, reviewing labs and radiographs, reviewing notes and consultations, discussing with staff and family was more than 35 minutes. Thanks for letting us see this patient in consultation. Please contact us with any questions. Office [phone] or after hours through [employer], x [phone].

## 2020-12-08 NOTE — CARE COORDINATION
Social Work/Discharge Planning:  Discharge order noted. Patient to return to [facility] LOC. Genoveva Pelaez with [facility] and arranged Ambulance transport for 9:00pm, which was the earliest time available. Notified patient Hope Welch (ph: [phone]), RN and left a voicemail for Liset Hadley at [facility] regarding discharge time.   Electronically signed by JS Obrien on 12/8/2020 at 3:27 PM

## 2020-12-08 NOTE — PROGRESS NOTES
CLINICAL PHARMACY NOTE: MEDS TO [address] Select Patient?: No  Total # of Prescriptions Filled: 1   The following medications were delivered to the patient:  · Dexamethasone 6  Total # of Interventions Completed: 6  Time Spent (min): 45    Additional Documentation:

## 2020-12-09 NOTE — PROGRESS NOTES
Pt discharged via [facility] at 2147. IV access removed and tele pack removed. All belongings packed with patient and all questions answered.

## 2020-12-09 NOTE — PROGRESS NOTES
Report called and given to [address] at [facility]. All questions answered at this time.

## 2020-12-10 LAB
BLOOD CULTURE, ROUTINE: NORMAL
CULTURE, BLOOD 2: NORMAL

## 2020-12-27 ENCOUNTER — APPOINTMENT (OUTPATIENT)
Dept: GENERAL RADIOLOGY | Age: 60
DRG: 292 | End: 2020-12-27
Payer: MEDICARE

## 2020-12-27 ENCOUNTER — HOSPITAL ENCOUNTER (INPATIENT)
Age: 60
LOS: 3 days | Discharge: NURSING, REHAB OR LONG-TERM CARE | DRG: 292 | End: 2020-12-31
Attending: EMERGENCY MEDICINE | Admitting: INTERNAL MEDICINE
Payer: MEDICARE

## 2020-12-27 DIAGNOSIS — D64.9 ANEMIA, UNSPECIFIED TYPE: Primary | ICD-10-CM

## 2020-12-27 LAB
ANION GAP SERPL CALCULATED.3IONS-SCNC: 14 MMOL/L (ref 7–16)
BUN BLDV-MCNC: 91 MG/DL (ref 8–23)
CALCIUM SERPL-MCNC: 7.9 MG/DL (ref 8.6–10.2)
CHLORIDE BLD-SCNC: 93 MMOL/L (ref 98–107)
CO2: 26 MMOL/L (ref 22–29)
CREAT SERPL-MCNC: 6.5 MG/DL (ref 0.7–1.2)
GFR AFRICAN AMERICAN: 11
GFR NON-AFRICAN AMERICAN: 9 ML/MIN/1.73
GLUCOSE BLD-MCNC: 249 MG/DL (ref 74–99)
MAGNESIUM: 1.9 MG/DL (ref 1.6–2.6)
POTASSIUM SERPL-SCNC: 4.6 MMOL/L (ref 3.5–5)
SODIUM BLD-SCNC: 133 MMOL/L (ref 132–146)
TROPONIN: 0.1 NG/ML (ref 0–0.03)

## 2020-12-27 PROCEDURE — 86900 BLOOD TYPING SEROLOGIC ABO: CPT

## 2020-12-27 PROCEDURE — 93005 ELECTROCARDIOGRAM TRACING: CPT | Performed by: STUDENT IN AN ORGANIZED HEALTH CARE EDUCATION/TRAINING PROGRAM

## 2020-12-27 PROCEDURE — 36415 COLL VENOUS BLD VENIPUNCTURE: CPT

## 2020-12-27 PROCEDURE — 84484 ASSAY OF TROPONIN QUANT: CPT

## 2020-12-27 PROCEDURE — 86850 RBC ANTIBODY SCREEN: CPT

## 2020-12-27 PROCEDURE — 86901 BLOOD TYPING SEROLOGIC RH(D): CPT

## 2020-12-27 PROCEDURE — 85025 COMPLETE CBC W/AUTO DIFF WBC: CPT

## 2020-12-27 PROCEDURE — 99285 EMERGENCY DEPT VISIT HI MDM: CPT

## 2020-12-27 PROCEDURE — 80048 BASIC METABOLIC PNL TOTAL CA: CPT

## 2020-12-27 PROCEDURE — P9016 RBC LEUKOCYTES REDUCED: HCPCS

## 2020-12-27 PROCEDURE — 86923 COMPATIBILITY TEST ELECTRIC: CPT

## 2020-12-27 PROCEDURE — 71045 X-RAY EXAM CHEST 1 VIEW: CPT

## 2020-12-27 PROCEDURE — 83735 ASSAY OF MAGNESIUM: CPT

## 2020-12-27 RX ORDER — SODIUM CHLORIDE 0.9 % (FLUSH) 0.9 %
SYRINGE (ML) INJECTION
Status: DISPENSED
Start: 2020-12-27 | End: 2020-12-28

## 2020-12-27 RX ORDER — 0.9 % SODIUM CHLORIDE 0.9 %
250 INTRAVENOUS SOLUTION INTRAVENOUS ONCE
Status: DISCONTINUED | OUTPATIENT
Start: 2020-12-27 | End: 2020-12-31 | Stop reason: HOSPADM

## 2020-12-27 NOTE — Clinical Note
Patient Class: Inpatient [101]   REQUIRED: Diagnosis: Leg wound, left, subsequent encounter [387581]   Estimated Length of Stay: Estimated stay of more than 2 midnights   Admitting Provider: Vicente CLAYTON [de-identified]   Telemetry Bed Required?: Yes

## 2020-12-28 PROBLEM — S81.802D LEG WOUND, LEFT, SUBSEQUENT ENCOUNTER: Status: ACTIVE | Noted: 2020-12-28

## 2020-12-28 PROBLEM — D64.9 ANEMIA: Status: ACTIVE | Noted: 2020-12-28

## 2020-12-28 LAB
ABO/RH: NORMAL
ANION GAP SERPL CALCULATED.3IONS-SCNC: 11 MMOL/L (ref 7–16)
ANTIBODY SCREEN: NORMAL
BASOPHILIC STIPPLING: ABNORMAL
BASOPHILS ABSOLUTE: 0 E9/L (ref 0–0.2)
BASOPHILS RELATIVE PERCENT: 0 % (ref 0–2)
BLOOD BANK DISPENSE STATUS: NORMAL
BLOOD BANK DISPENSE STATUS: NORMAL
BLOOD BANK PRODUCT CODE: NORMAL
BLOOD BANK PRODUCT CODE: NORMAL
BPU ID: NORMAL
BPU ID: NORMAL
BUN BLDV-MCNC: 94 MG/DL (ref 8–23)
CALCIUM SERPL-MCNC: 7.9 MG/DL (ref 8.6–10.2)
CHLORIDE BLD-SCNC: 94 MMOL/L (ref 98–107)
CO2: 28 MMOL/L (ref 22–29)
CREAT SERPL-MCNC: 6.6 MG/DL (ref 0.7–1.2)
CREATININE URINE: 46 MG/DL (ref 40–278)
DESCRIPTION BLOOD BANK: NORMAL
DESCRIPTION BLOOD BANK: NORMAL
EKG ATRIAL RATE: 89 BPM
EKG P AXIS: 30 DEGREES
EKG P-R INTERVAL: 172 MS
EKG Q-T INTERVAL: 408 MS
EKG QRS DURATION: 142 MS
EKG QTC CALCULATION (BAZETT): 496 MS
EKG R AXIS: -54 DEGREES
EKG T AXIS: 59 DEGREES
EKG VENTRICULAR RATE: 89 BPM
EOSINOPHILS ABSOLUTE: 0.02 E9/L (ref 0.05–0.5)
EOSINOPHILS RELATIVE PERCENT: 0.7 % (ref 0–6)
GFR AFRICAN AMERICAN: 10
GFR NON-AFRICAN AMERICAN: 9 ML/MIN/1.73
GLUCOSE BLD-MCNC: 212 MG/DL (ref 74–99)
HCT VFR BLD CALC: 21 % (ref 37–54)
HCT VFR BLD CALC: 22.2 % (ref 37–54)
HCT VFR BLD CALC: 24.5 % (ref 37–54)
HEMOGLOBIN: 6.4 G/DL (ref 12.5–16.5)
HEMOGLOBIN: 6.8 G/DL (ref 12.5–16.5)
HEMOGLOBIN: 7.4 G/DL (ref 12.5–16.5)
HYPOCHROMIA: ABNORMAL
IMMATURE GRANULOCYTES #: 0.01 E9/L
IMMATURE GRANULOCYTES %: 0.4 % (ref 0–5)
LYMPHOCYTES ABSOLUTE: 0.12 E9/L (ref 1.5–4)
LYMPHOCYTES RELATIVE PERCENT: 4.5 % (ref 20–42)
MCH RBC QN AUTO: 29.9 PG (ref 26–35)
MCHC RBC AUTO-ENTMCNC: 30.5 % (ref 32–34.5)
MCV RBC AUTO: 98.1 FL (ref 80–99.9)
METER GLUCOSE: 222 MG/DL (ref 74–99)
METER GLUCOSE: 225 MG/DL (ref 74–99)
METER GLUCOSE: 271 MG/DL (ref 74–99)
METER GLUCOSE: 388 MG/DL (ref 74–99)
MONOCYTES ABSOLUTE: 0.21 E9/L (ref 0.1–0.95)
MONOCYTES RELATIVE PERCENT: 7.8 % (ref 2–12)
NEUTROPHILS ABSOLUTE: 2.32 E9/L (ref 1.8–7.3)
NEUTROPHILS RELATIVE PERCENT: 86.6 % (ref 43–80)
OVALOCYTES: ABNORMAL
PDW BLD-RTO: 16 FL (ref 11.5–15)
PLATELET # BLD: 88 E9/L (ref 130–450)
PLATELET CONFIRMATION: NORMAL
PMV BLD AUTO: 10.2 FL (ref 7–12)
POIKILOCYTES: ABNORMAL
POLYCHROMASIA: ABNORMAL
POTASSIUM SERPL-SCNC: 4.7 MMOL/L (ref 3.5–5)
RBC # BLD: 2.14 E12/L (ref 3.8–5.8)
SCHISTOCYTES: ABNORMAL
SODIUM BLD-SCNC: 133 MMOL/L (ref 132–146)
SODIUM URINE: 53 MMOL/L
TEAR DROP CELLS: ABNORMAL
WBC # BLD: 2.7 E9/L (ref 4.5–11.5)

## 2020-12-28 PROCEDURE — 6370000000 HC RX 637 (ALT 250 FOR IP): Performed by: PHYSICIAN ASSISTANT

## 2020-12-28 PROCEDURE — 85018 HEMOGLOBIN: CPT

## 2020-12-28 PROCEDURE — 6360000002 HC RX W HCPCS: Performed by: PHYSICIAN ASSISTANT

## 2020-12-28 PROCEDURE — 97165 OT EVAL LOW COMPLEX 30 MIN: CPT

## 2020-12-28 PROCEDURE — 6360000002 HC RX W HCPCS: Performed by: INTERNAL MEDICINE

## 2020-12-28 PROCEDURE — 86334 IMMUNOFIX E-PHORESIS SERUM: CPT

## 2020-12-28 PROCEDURE — 97161 PT EVAL LOW COMPLEX 20 MIN: CPT

## 2020-12-28 PROCEDURE — 36430 TRANSFUSION BLD/BLD COMPNT: CPT

## 2020-12-28 PROCEDURE — 36415 COLL VENOUS BLD VENIPUNCTURE: CPT

## 2020-12-28 PROCEDURE — P9016 RBC LEUKOCYTES REDUCED: HCPCS

## 2020-12-28 PROCEDURE — 85014 HEMATOCRIT: CPT

## 2020-12-28 PROCEDURE — 2580000003 HC RX 258: Performed by: INTERNAL MEDICINE

## 2020-12-28 PROCEDURE — 84300 ASSAY OF URINE SODIUM: CPT

## 2020-12-28 PROCEDURE — 2580000003 HC RX 258: Performed by: PHYSICIAN ASSISTANT

## 2020-12-28 PROCEDURE — 80048 BASIC METABOLIC PNL TOTAL CA: CPT

## 2020-12-28 PROCEDURE — 84166 PROTEIN E-PHORESIS/URINE/CSF: CPT

## 2020-12-28 PROCEDURE — 6370000000 HC RX 637 (ALT 250 FOR IP): Performed by: INTERNAL MEDICINE

## 2020-12-28 PROCEDURE — 82570 ASSAY OF URINE CREATININE: CPT

## 2020-12-28 PROCEDURE — 2060000000 HC ICU INTERMEDIATE R&B

## 2020-12-28 PROCEDURE — 82962 GLUCOSE BLOOD TEST: CPT

## 2020-12-28 PROCEDURE — 2700000000 HC OXYGEN THERAPY PER DAY

## 2020-12-28 RX ORDER — CLONIDINE HYDROCHLORIDE 0.1 MG/1
0.1 TABLET ORAL 3 TIMES DAILY
Status: DISCONTINUED | OUTPATIENT
Start: 2020-12-28 | End: 2020-12-31 | Stop reason: HOSPADM

## 2020-12-28 RX ORDER — ACETAMINOPHEN 650 MG/1
650 SUPPOSITORY RECTAL EVERY 6 HOURS PRN
Status: DISCONTINUED | OUTPATIENT
Start: 2020-12-28 | End: 2020-12-31 | Stop reason: HOSPADM

## 2020-12-28 RX ORDER — ASPIRIN 81 MG/1
81 TABLET, CHEWABLE ORAL 2 TIMES DAILY
COMMUNITY

## 2020-12-28 RX ORDER — FENOFIBRATE 48 MG/1
48 TABLET, COATED ORAL NIGHTLY
Status: DISCONTINUED | OUTPATIENT
Start: 2020-12-28 | End: 2020-12-28 | Stop reason: CLARIF

## 2020-12-28 RX ORDER — CITALOPRAM 20 MG/1
20 TABLET ORAL DAILY
Status: DISCONTINUED | OUTPATIENT
Start: 2020-12-28 | End: 2020-12-31 | Stop reason: HOSPADM

## 2020-12-28 RX ORDER — SEVELAMER CARBONATE 800 MG/1
1600 TABLET, FILM COATED ORAL
Status: DISCONTINUED | OUTPATIENT
Start: 2020-12-28 | End: 2020-12-31 | Stop reason: HOSPADM

## 2020-12-28 RX ORDER — POLYETHYLENE GLYCOL 3350 17 G/17G
17 POWDER, FOR SOLUTION ORAL DAILY PRN
Status: DISCONTINUED | OUTPATIENT
Start: 2020-12-28 | End: 2020-12-31 | Stop reason: HOSPADM

## 2020-12-28 RX ORDER — ARIPIPRAZOLE 5 MG/1
2.5 TABLET ORAL DAILY
Status: DISCONTINUED | OUTPATIENT
Start: 2020-12-28 | End: 2020-12-31 | Stop reason: HOSPADM

## 2020-12-28 RX ORDER — DEXTROSE MONOHYDRATE 50 MG/ML
100 INJECTION, SOLUTION INTRAVENOUS PRN
Status: DISCONTINUED | OUTPATIENT
Start: 2020-12-28 | End: 2020-12-31 | Stop reason: HOSPADM

## 2020-12-28 RX ORDER — ACETAMINOPHEN 325 MG/1
650 TABLET ORAL EVERY 4 HOURS PRN
COMMUNITY

## 2020-12-28 RX ORDER — METOLAZONE 5 MG/1
5 TABLET ORAL DAILY
Status: DISCONTINUED | OUTPATIENT
Start: 2020-12-28 | End: 2020-12-31 | Stop reason: HOSPADM

## 2020-12-28 RX ORDER — DILTIAZEM HYDROCHLORIDE 240 MG/1
240 CAPSULE, COATED, EXTENDED RELEASE ORAL DAILY
Status: DISCONTINUED | OUTPATIENT
Start: 2020-12-28 | End: 2020-12-31 | Stop reason: HOSPADM

## 2020-12-28 RX ORDER — SODIUM CHLORIDE 0.9 % (FLUSH) 0.9 %
10 SYRINGE (ML) INJECTION PRN
Status: DISCONTINUED | OUTPATIENT
Start: 2020-12-28 | End: 2020-12-31 | Stop reason: HOSPADM

## 2020-12-28 RX ORDER — FUROSEMIDE 20 MG/1
20 TABLET ORAL 2 TIMES DAILY
Status: DISCONTINUED | OUTPATIENT
Start: 2020-12-28 | End: 2020-12-31 | Stop reason: HOSPADM

## 2020-12-28 RX ORDER — SODIUM CHLORIDE 0.9 % (FLUSH) 0.9 %
10 SYRINGE (ML) INJECTION EVERY 12 HOURS SCHEDULED
Status: DISCONTINUED | OUTPATIENT
Start: 2020-12-28 | End: 2020-12-31 | Stop reason: HOSPADM

## 2020-12-28 RX ORDER — DOXEPIN HYDROCHLORIDE 25 MG/1
25 CAPSULE ORAL NIGHTLY
Status: DISCONTINUED | OUTPATIENT
Start: 2020-12-28 | End: 2020-12-31 | Stop reason: HOSPADM

## 2020-12-28 RX ORDER — 0.9 % SODIUM CHLORIDE 0.9 %
20 INTRAVENOUS SOLUTION INTRAVENOUS ONCE
Status: COMPLETED | OUTPATIENT
Start: 2020-12-28 | End: 2020-12-28

## 2020-12-28 RX ORDER — PANTOPRAZOLE SODIUM 40 MG/1
40 TABLET, DELAYED RELEASE ORAL
Status: DISCONTINUED | OUTPATIENT
Start: 2020-12-28 | End: 2020-12-31 | Stop reason: HOSPADM

## 2020-12-28 RX ORDER — DILTIAZEM HYDROCHLORIDE 240 MG/1
240 CAPSULE, EXTENDED RELEASE ORAL DAILY
Status: DISCONTINUED | OUTPATIENT
Start: 2020-12-28 | End: 2020-12-28 | Stop reason: CLARIF

## 2020-12-28 RX ORDER — NICOTINE POLACRILEX 4 MG
15 LOZENGE BUCCAL PRN
Status: DISCONTINUED | OUTPATIENT
Start: 2020-12-28 | End: 2020-12-31 | Stop reason: HOSPADM

## 2020-12-28 RX ORDER — INSULIN GLARGINE 100 [IU]/ML
32 INJECTION, SOLUTION SUBCUTANEOUS EVERY MORNING
Status: DISCONTINUED | OUTPATIENT
Start: 2020-12-28 | End: 2020-12-31 | Stop reason: HOSPADM

## 2020-12-28 RX ORDER — DEXTROSE MONOHYDRATE 25 G/50ML
12.5 INJECTION, SOLUTION INTRAVENOUS PRN
Status: DISCONTINUED | OUTPATIENT
Start: 2020-12-28 | End: 2020-12-31 | Stop reason: HOSPADM

## 2020-12-28 RX ORDER — ACETAMINOPHEN 325 MG/1
650 TABLET ORAL EVERY 6 HOURS PRN
Status: DISCONTINUED | OUTPATIENT
Start: 2020-12-28 | End: 2020-12-31 | Stop reason: HOSPADM

## 2020-12-28 RX ORDER — IPRATROPIUM BROMIDE AND ALBUTEROL SULFATE 2.5; .5 MG/3ML; MG/3ML
1 SOLUTION RESPIRATORY (INHALATION)
COMMUNITY

## 2020-12-28 RX ORDER — FENOFIBRATE 54 MG/1
54 TABLET ORAL DAILY
Status: DISCONTINUED | OUTPATIENT
Start: 2020-12-28 | End: 2020-12-31 | Stop reason: HOSPADM

## 2020-12-28 RX ORDER — HEPARIN SODIUM 10000 [USP'U]/ML
5000 INJECTION, SOLUTION INTRAVENOUS; SUBCUTANEOUS EVERY 8 HOURS SCHEDULED
Status: DISCONTINUED | OUTPATIENT
Start: 2020-12-28 | End: 2020-12-31 | Stop reason: HOSPADM

## 2020-12-28 RX ADMIN — ARIPIPRAZOLE 2.5 MG: 5 TABLET ORAL at 09:39

## 2020-12-28 RX ADMIN — HEPARIN SODIUM 5000 UNITS: 10000 INJECTION INTRAVENOUS; SUBCUTANEOUS at 21:24

## 2020-12-28 RX ADMIN — INSULIN LISPRO 2 UNITS: 100 INJECTION, SOLUTION INTRAVENOUS; SUBCUTANEOUS at 06:49

## 2020-12-28 RX ADMIN — MICONAZOLE NITRATE: 2 OINTMENT TOPICAL at 22:17

## 2020-12-28 RX ADMIN — CLONIDINE HYDROCHLORIDE 0.1 MG: 0.1 TABLET ORAL at 14:33

## 2020-12-28 RX ADMIN — PANTOPRAZOLE SODIUM 40 MG: 40 TABLET, DELAYED RELEASE ORAL at 05:26

## 2020-12-28 RX ADMIN — INSULIN GLARGINE 32 UNITS: 100 INJECTION, SOLUTION SUBCUTANEOUS at 09:41

## 2020-12-28 RX ADMIN — SEVELAMER CARBONATE 1600 MG: 800 TABLET, FILM COATED ORAL at 18:25

## 2020-12-28 RX ADMIN — SODIUM CHLORIDE, PRESERVATIVE FREE 10 ML: 5 INJECTION INTRAVENOUS at 09:43

## 2020-12-28 RX ADMIN — SODIUM CHLORIDE, PRESERVATIVE FREE 10 ML: 5 INJECTION INTRAVENOUS at 21:00

## 2020-12-28 RX ADMIN — EPOETIN ALFA-EPBX 5000 UNITS: 10000 INJECTION, SOLUTION INTRAVENOUS; SUBCUTANEOUS at 16:51

## 2020-12-28 RX ADMIN — CITALOPRAM HYDROBROMIDE 20 MG: 20 TABLET ORAL at 09:40

## 2020-12-28 RX ADMIN — HEPARIN SODIUM 5000 UNITS: 10000 INJECTION INTRAVENOUS; SUBCUTANEOUS at 14:33

## 2020-12-28 RX ADMIN — FUROSEMIDE 20 MG: 20 TABLET ORAL at 21:24

## 2020-12-28 RX ADMIN — MICONAZOLE NITRATE: 2 OINTMENT TOPICAL at 14:33

## 2020-12-28 RX ADMIN — DOXEPIN HYDROCHLORIDE 25 MG: 25 CAPSULE ORAL at 21:24

## 2020-12-28 RX ADMIN — INSULIN LISPRO 2 UNITS: 100 INJECTION, SOLUTION INTRAVENOUS; SUBCUTANEOUS at 21:26

## 2020-12-28 RX ADMIN — FENOFIBRATE 54 MG: 54 TABLET ORAL at 09:39

## 2020-12-28 RX ADMIN — INSULIN LISPRO 5 UNITS: 100 INJECTION, SOLUTION INTRAVENOUS; SUBCUTANEOUS at 15:56

## 2020-12-28 RX ADMIN — DILTIAZEM HYDROCHLORIDE 240 MG: 240 CAPSULE, COATED, EXTENDED RELEASE ORAL at 09:40

## 2020-12-28 RX ADMIN — METOLAZONE 5 MG: 5 TABLET ORAL at 09:39

## 2020-12-28 RX ADMIN — INSULIN LISPRO 2 UNITS: 100 INJECTION, SOLUTION INTRAVENOUS; SUBCUTANEOUS at 11:17

## 2020-12-28 RX ADMIN — SODIUM CHLORIDE 20 ML: 9 INJECTION, SOLUTION INTRAVENOUS at 12:40

## 2020-12-28 RX ADMIN — CLONIDINE HYDROCHLORIDE 0.1 MG: 0.1 TABLET ORAL at 09:40

## 2020-12-28 RX ADMIN — HEPARIN SODIUM 5000 UNITS: 10000 INJECTION INTRAVENOUS; SUBCUTANEOUS at 05:26

## 2020-12-28 RX ADMIN — PANTOPRAZOLE SODIUM 40 MG: 40 TABLET, DELAYED RELEASE ORAL at 15:55

## 2020-12-28 RX ADMIN — CLONIDINE HYDROCHLORIDE 0.1 MG: 0.1 TABLET ORAL at 21:24

## 2020-12-28 RX ADMIN — FUROSEMIDE 20 MG: 20 TABLET ORAL at 09:39

## 2020-12-28 ASSESSMENT — PAIN SCALES - GENERAL: PAINLEVEL_OUTOF10: 0

## 2020-12-28 NOTE — PROGRESS NOTES
Wound / ostomy dept consulted for this Pt admitted with anemia. The Pt has small , open areas on his R stump . He states that he gets bumped by his wheelchair. The L leg wound is superficial. The Pt has intertriginous skin folds and fungal dermatitis in the gluteal cleft. Recommend dressings to R stump, opticell to L leg and Aloe Vesta to skin folds and buttocks, SOS precautions.

## 2020-12-28 NOTE — PROGRESS NOTES
Physical Therapy    Facility/Department: [address] INTERMEDIATE 1  Initial Assessment    NAME: Vidhya Cloud  : 1960  MRN: [id number]    Date of Service: 2020       REQUIRES PT FOLLOW UP: Yes       Patient Diagnosis(es): The encounter diagnosis was Anemia, unspecified type. has a past medical history of Anemia in chronic kidney disease, Arthropathy, unspecified, Below knee amputation ([address] Bifascicular block, Bipolar disorder, unspecified ([address] Cellulitis, CHF (congestive heart failure) ([address] Chronic kidney disease, stage 5 ([address] COPD (chronic obstructive pulmonary disease) ([address] Diabetes mellitus ([address] Fascicular block, Generalized muscle weakness, Hyperlipidemia, Hypertension, Intracranial injury without loss of consciousness ([address] Osteoporosis, Peripheral vascular disease ([address] Personality disorder ([address] Presence of intraocular lens, Right bundle branch block, Schizoaffective disorder ([address] Splenomegaly, Unspecified age-related cataract, Venous insufficiency (chronic) (peripheral), and Vitamin D deficiency. has a past surgical history that includes Upper gastrointestinal endoscopy (N/A, 2020) and Leg amputation below knee (Right).       Referring Provider:  Dr. Lelia Kunz Therapist: Toni Thompson #: [id number]   DIAGNOSIS: L EL wound. Per chart, found unresponsive at [facility]   Additional Pertinent History:COPD, R AKA, COVID   PRECAUTIONS: falls, O2     Social:  Pt admitted from SNF. Pt questionable historian. States he transfers to w./c on his own by placing chair in front of him, standing and then turning to sit. Does not wear prosthesis.      Initial Evaluation  Date: 2020 Treatment      Short Term/ Long Term   Goals   Was pt agreeable to Eval/treatment?  yes       Does pt have pain? no       Bed Mobility  Rolling: NT  Supine to sit: mod assist   Sit to supine: NT   Scooting: min assist in sit    SBA    Transfers Sit to stand: mod assist  Stand to sit: mod assist  Stand pivot: NT   Min assist   Ambulation    NT   N/A   LE strength     3- to 3+/5    increase by 1/ 3 MMT grade    balance      Sitting balance independent , static stand min assist        AM-PAC Raw score               10/24             Pt is alert and grossly oriented. Some confusion noted. Flight of thought  LE AAROM: WFL  Endurance: decreased     ASSESSMENT  Pt displays functional ability as noted in the objective portion of this evaluation.       Patient education  Pt educated on PT objectives, had placement with transfers and safety with mobility      Patient response to education:   Pt verbalized understanding Pt demonstrated skill Pt requires further education in this area   no  needs cues           Comments:  Mobility as above. Mod assist for all mobility with cues needed for safety with mobility. Needs frequent redirection to task at hand. Pt refused transfer to chair. Pt left sitting at EOB per his request,  with call light in reach      Pt's/ family goals   1. None stated     Patient and or family understand(s) diagnosis, prognosis, and plan of care. no     PLAN:     PLAN OF CARE:     Current Treatment Recommendations      [x]? Strengthening     []? ROM   [x]? Balance Training   [x]? Endurance Training   [x]? Transfer Training   []? Gait Training   []? Stair Training   []? Positioning   [x]? Safety and Education Training   [x]? Patient/Caregiver Education   []? HEP  []? Other      Frequency of treatments: 2-5x/week x 7 -10 days     Time in  1318   Time out  1330         Evaluation Time includes thorough review of current medical information, gathering information on past medical history/social history and prior level of function, completion of standardized testing/informal observation of tasks, assessment of data and education on plan of care and goals.     CPT codes:  [x]? Low Complexity PT evaluation [id number]  []? Moderate Complexity PT evaluation 98412  []?  High Complexity PT evaluation 33006  []? PT Re-evaluation [id number]  []? Gait training 40714 minutes  []? Manual therapy 35674 minutes  []? Therapeutic activities 63925 minutes  []? Therapeutic exercises 18142 minutes  []?  Neuromuscular reeducation 63779 minutes      Mariano Cochran   PT 1370

## 2020-12-28 NOTE — PROGRESS NOTES
Occupational Therapy  OCCUPATIONAL THERAPY INITIAL EVALUATION      Date:2020  Patient Name: James BLANCO [facility] VIVEK  MRN: [id number]  : 1960  Room: [address] [place] [address]    Referring Provider: MARTIN Muñoz  Evaluating OT: Shaw Cooper Yoli Jerez Adelaida [id number]    AM-PAC Daily Activity Raw Score: 15/24      Recommended Adaptive Equipment: Continue to assess. Diagnosis: Leg wound, left, subsequent encounter [S81.802D]  Anemia [D64.9]     Pertinent Medical History: R AKA, CHF, COPD, DM, HTN, osteoporosis, schizoaffective disorder, personality disorder, CKD     Precautions: falls, R AKA (does not wear prosthesis), O2 via nasal cannula, skin integrity    Home Living: Patient is a resident of [facility]. Prior Level of Function (PLOF): Patient reported that he was mostly independent with ADLs. Patient stated that he was able to perform functional transfers independently prior to this hospitalization. Patient is a questionable historian. Pain Level: Patient denied experiencing pain. Cognition: Patient alert and oriented grossly. WFL command follow demonstrated. Fair insight to current abilities/limitations. Confusion demonstrated occasionally. Tangential speech demonstrated, requiring re-direction. Memory: Fair   Sequencing: Fair   Problem Solving: Fair   Judgement/Safety: Fair    Functional Assessment:   Initial Eval Status  Date: 2020 Treatment Status  Date:  Short Term Goals   Feeding Setup     Grooming SBA  Setup  (seated)   UB Dressing Min A  Setup   LB Dressing Max A  Min A - with use of AE, as needed/appropriate   Bathing Max A  Min A - with use of AE/DME, as needed/appropriate   Toileting Max A  Min A   Bed Mobility  Not assessed. Functional Transfers Sit-to-Stand: Mod A   from EOB  Patient declined completion of stand-pivot transfer into bedside chair. CGA   Functional Mobility Not assessed.   N/A   Balance Sitting: Good  (at EOB)  Standing: Poor+  (without device)  Fair dynamic standing balance during completion of ADLs and other functional tasks. Activity Tolerance Limited  Patient will demonstrate Good understanding and consistent implementation of energy conservation techniques and work simplification techniques into ADL/IADL routines. Visual/  Perceptual WFL     N/A   B UE Strength 4-/5  Patient will demonstrate 4+/5 B UE strength in order to maximize independence with ADLs and functional transfers. Long-Term Goal: Patient will increase functional independence to PLOF in order to allow patient to live in least restrictive environment. ROM: Additional Information:    R UE  WFL    L UE WFL      Hearing: WFL  Sensation: No complaints of numbness/tingling in B UEs. Tone: WFL  Edema: No    Comments: RN approved patient's participation in EOB/OOB activities. Upon arrival, patient seated at EOB with PT present. At end of session, patient seated at EOB (per patient preference) with call light and phone within reach and all lines and tubes intact. Patient would benefit from continued skilled OT to increase safety and independence with completion of ADL/IADL tasks for functional independence and quality of life.      Assessment of Current Deficits:   Functional Mobility [x]  ADLs [x] Strength [x]  Cognition []  Functional Transfers  [x] IADLs [x] Safety Awareness [x]  Endurance [x]  Fine Motor Coordination [] Balance [x] Vision/Perception [] Sensation []   Gross Motor Coordination [] ROM [] Delirium []                  Motor Control []    Plan of Care: 2-5 days/week for 1-2 weeks PRN  [x]ADL retraining/adaptive techniques and AE recommendations to increase functional independence within precautions  [x]Energy conservation techniques to improve tolerance for ADLs/IADLs  [x]Functional transfer/mobility training/DME recommendations for increased independence, safety and fall prevention  [x]Patient/family education to increase safety and functional independence  [x]Environmental modifications for safe mobility and completion of ADLs/IADLs  []Cognitive retraining exercises to improve problem solving skills & safe participation in ADLs/IADLs   []Sensory re-education techniques to improve extremity awareness, maintain skin integrity and improve hand function   []Visual/Perceptual retraining  to improve body awareness and safety during transfers and ADLs   []Splinting/positioning needs to maintain joint/skin integrity and prevent contractures   [x]Therapeutic activity to improve functional performance during ADLs/IADLs  [x]Therapeutic exercise to improve tolerance and functional strength for ADLs/IADLs  [x]Balance retraining/tolerance tasks for facilitation of postural control with dynamic challenges during ADLs   [x]Neuromuscular re-education: facilitate righting/equilibrium reactions, midline orientation, scapular stability/mobility, normalize muscle tone, and facilitate active functional movement/attention  []Delirium prevention/treatment   []Positioning to improve functional independence and prevent skin breakdown   []Other:     Rehab Potential: Fair to Good for established goals. Patient / Family Goal: No goal stated. Patient and/or family were instructed on functional diagnosis, prognosis/goals, and OT plan of care. Demonstrated limited understanding.     Eval Complexity: Low    Time In: 1326  Time Out: 1334  Total Treatment Time: 0 minutes      Minutes Units   OT Eval Low 57629 8 1   OT Eval Medium 66869     OT Eval High 97853     OT Re-Eval [id number]     Therapeutic Ex 60591     Therapeutic Activities 01151     ADL/Self Care 31219     Orthotic Management 25742     Neuro Re-Ed 23679     Non-Billable Time N/A ---     Evaluation time includes thorough review of current medical information, gathering information on past medical history/social history and prior level of function, completion of standardized testing/informal observation of tasks, assessment of data, and education on plan of care and goals. Suzy Crockett, OTR/L  License Number: [id number]

## 2020-12-28 NOTE — ED NOTES
Bed: 23  Expected date:   Expected time:   Means of arrival:   Comments:   [id number] Raza Mendez RN  12/27/20 6417

## 2020-12-28 NOTE — CONSULTS
[facility] Roberto A. Lorel Murphy, MD Cleophas Flasher, MD Andrea Hopper, MD Arla Saint, DO, [address] [place] [address] Mary NORRIS .  Consultation  Patient's Name: Laz Morgan  2:58 PM  12/28/2020    Nephrologist: Evens Ramirez MD    Reason for Consult:  Chronic kidney disease     Requesting Physician:  Pauline Park DO     Chief Complaint:  Abnormal blood work     History Obtained From:  Records staff patient     History of Present Ilness:      18-year-old gent with a complicated past medical history that includes Schizoaffective disorder, diabetes, hypertension, peripheral arterial disease, advanced CKD, stage V with severe proteinuria, further evaluation of which with a biopsy has foregone in the past, COPD, former smoker. He is a resident IV wounds. Due to his psychiatric disorder he has a court appointed guardian who is responsible for his medical decisions. He is presenting with cc of abnormal blood work showing severe anaemia   He did receive rbc x2   Hg today 6.8   He was having lunch when seeing him . He is alert awake his chest is clear to exam and he clinically look euvolemic  When talking to him about RRT/HD he is telling me this is not an option for him .      Past Medical History:   Diagnosis Date    Anemia in chronic kidney disease 2020    Arthropathy, unspecified     Below knee amputation ([address] 2002    right     Bifascicular block 2010    Bipolar disorder, unspecified ([address] 2002    Cellulitis 2017    LLE    CHF (congestive heart failure) ([address]     Chronic kidney disease, stage 5 ([address] 10/16/2020    COPD (chronic obstructive pulmonary disease) ([address]     Diabetes mellitus ([address]     Fascicular block 2010    Generalized muscle weakness 2018    Hyperlipidemia     Hypertension     Intracranial injury without loss of consciousness ([address] 2002    Osteoporosis 2003    Peripheral vascular disease ([address]     Personality disorder ([address]     Presence of intraocular lens 2009 unspecified eye    Right bundle branch block 2010    [facility]     Splenomegaly 2010    Unspecified age-related cataract 2002    unspecified eye    Venous insufficiency (chronic) (peripheral) 2007    Vitamin D deficiency 2018       Past Surgical History:   Procedure Laterality Date    LEG AMPUTATION BELOW KNEE Right     UPPER GASTROINTESTINAL ENDOSCOPY N/A 9/4/2020    EGD ESOPHAGOGASTRODUODENOSCOPY performed by Ora Rutherford MD at [facility] ENDOSCOPY       Family History   Problem Relation Age of Onset    Breast Cancer Mother     High Blood Pressure Father         reports that he has quit smoking. His smoking use included cigarettes. He started smoking about 54 years ago. He has never used smokeless tobacco. He reports previous alcohol use. He reports that he does not use drugs. Allergies:  Patient has no known allergies.     Current Medications:        ARIPiprazole (ABILIFY) tablet 2.5 mg, Daily      citalopram (CELEXA) tablet 20 mg, Daily      cloNIDine (CATAPRES) tablet 0.1 mg, TID      doxepin (SINEQUAN) capsule 25 mg, Nightly      furosemide (LASIX) tablet 20 mg, BID      metOLazone (ZAROXOLYN) tablet 5 mg, Daily      pantoprazole (PROTONIX) tablet 40 mg, BID AC      insulin lispro (HUMALOG) injection vial 0-6 Units, TID WC      insulin lispro (HUMALOG) injection vial 0-3 Units, Nightly      glucose (GLUTOSE) 40 % oral gel 15 g, PRN      dextrose 50 % IV solution, PRN      glucagon (rDNA) injection 1 mg, PRN      dextrose 5 % solution, PRN      sodium chloride flush 0.9 % injection 10 mL, 2 times per day      sodium chloride flush 0.9 % injection 10 mL, PRN      acetaminophen (TYLENOL) tablet 650 mg, Q6H PRN    Or      acetaminophen (TYLENOL) suppository 650 mg, Q6H PRN      polyethylene glycol (GLYCOLAX) packet 17 g, Daily PRN      trimethobenzamide (TIGAN) injection 200 mg, Q6H PRN      heparin (porcine) injection 5,000 Units, 3 times per day     dilTIAZem (CARDIZEM CD) extended release capsule 240 mg, Daily      fenofibrate (TRICOR) tablet 54 mg, Daily      insulin glargine (LANTUS) injection vial 32 Units, QAM      miconazole nitrate 2 % ointment, BID      epoetin juliane-epbx (RETACRIT) injection 5,000 Units, Once per day on Mon Wed Fri      0.9 % sodium chloride bolus, Once        Review of Systems:   Constitutional: no fevers , no chills , feels ok   Eyes: no eye pain , no itching , no drainage  Ears, nose, mouth, throat, and face: no ear ,nose pain , hearing is ok ,no nasal drainage   Respiratory: no sob ,no cough ,no wheezing . Cardiovascular: no chest pain , no palpitation ,no sob . Gastrointestinal: no nausea, vomiting , constipation , no abdominal pain . Genitourinary:no urinary retention , no burning , dysuria . No polyuria   Hematologic/lymphatic: no bleeding , no cougulation issues . Musculoskeletal:no joint pain , no swelling . Neurological: no headaches ,no weakness , no numbness . Endocrine: no thirst , no weight issues . Physical exam:   Vital signs BP (!) 149/76   Pulse 84   Temp 98.2 °F (36.8 °C) (Oral)   Resp 20   Ht 5' 10\" (1.778 m)   Wt 265 lb 4.8 oz (120.3 kg)   SpO2 97%   BMI 38.07 kg/m²   Gen : NAD , appropriate for stated age . Head : at , nc   Neck : supple , no thyromegaly noted . Eyes : EOMI , PERRLA   CV : RRR , No M/R/G . Lungs: CTAB , no wheezing , good flow heard b/l   Abd : soft , NT , BS + , No Organomegaly appreciated . Skin : soft, dry . Neuro : CN  II-XII grossly intact , no focal neurologic deficit . Psych : cooperative .      Data:   Labs:  CBC with Differential:    Lab Results   Component Value Date    WBC 2.7 12/27/2020    RBC 2.14 12/27/2020    HGB 6.8 12/28/2020    HCT 22.2 12/28/2020    PLT 88 12/27/2020    MCV 98.1 12/27/2020    MCH 29.9 12/27/2020    MCHC 30.5 12/27/2020    RDW 16.0 12/27/2020    NRBC 0.0 11/17/2020    LYMPHOPCT 4.5 12/27/2020    MONOPCT 7.8 12/27/2020 BASOPCT 0.0 12/27/2020    MONOSABS 0.21 12/27/2020    LYMPHSABS 0.12 12/27/2020    EOSABS 0.02 12/27/2020    BASOSABS 0.00 12/27/2020     CMP:    Lab Results   Component Value Date     12/28/2020    K 4.7 12/28/2020    K 4.3 12/05/2020    CL 94 12/28/2020    CO2 28 12/28/2020    BUN 94 12/28/2020    CREATININE 6.6 12/28/2020    GFRAA 10 12/28/2020    LABGLOM 9 12/28/2020    GLUCOSE 212 12/28/2020    PROT 6.5 12/07/2020    LABALBU 3.1 12/07/2020    CALCIUM 7.9 12/28/2020    BILITOT 0.3 12/07/2020    ALKPHOS 40 12/07/2020    AST 16 12/07/2020    ALT 15 12/07/2020     Ionized Calcium:  No results found for: IONCA  Magnesium:    Lab Results   Component Value Date    MG 1.9 12/27/2020     Phosphorus:    Lab Results   Component Value Date    PHOS 5.6 12/08/2020     U/A:  No results found for: NITRITE, COLORU, PHUR, LABCAST, WBCUA, RBCUA, MUCUS, TRICHOMONAS, YEAST, BACTERIA, CLARITYU, SPECGRAV, LEUKOCYTESUR, UROBILINOGEN, BILIRUBINUR, BLOODU, GLUCOSEU, AMORPHOUS  Microalbumen/Creatinine ratio:  No components found for: RUCREAT  Iron Saturation:  No components found for: PERCENTFE  TIBC:    Lab Results   Component Value Date    TIBC 240 12/06/2020     FERRITIN:    Lab Results   Component Value Date    FERRITIN 511 12/05/2020        Imaging:  XR CHEST PORTABLE   Final Result   Improvement in widespread airspace disease seen on the prior examination with   decreasing airspace disease in the right upper lobe and left perihilar region. Right pleural effusion, no significantly changed. Probable smaller left pleural effusion. Assessment    Chronic kidney disease stage V : Dialysis discussion has taken place on last admission by dr Allie Vizcaino with him and with his guardian and decision was made based on pt wishes and the fact this will change his life style not to proceed with HD or AVF . He continue to be not uremic clinically . No current need for HD .  Will f/u closely     Anaemia of chronic disease : s/p RBC transfusion . Will start retacrit . Check iron studies (results will be affected by transfusion) . Also check spep/upep    Mineral bone disease : start sevelamer 1600 mg tid with meals .  Check pth ,vit d     Hypertension     IDDM       Thank you Dr. Milly Booker DO for allowing us to participate in care of Alejandro Whyte           Electronically signed by Berto Bey MD on 12/28/2020 at 2:58 PM
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
## 2020-12-28 NOTE — PROGRESS NOTES
7:44 AM  Consult placed through [employer] text for Dr Maurer Heads.   Tao Newman, [facility]  12/28/2020

## 2020-12-28 NOTE — ED PROVIDER NOTES
HPI:     Jodi Clements is a 56 y.o. male presenting to the ED for unresponsive episode, beginning just prior to arrival.  The complaint is resolved at this time. Per nursing home, patient was found to be unresponsive and nursing home staff started to chest compressions when patient woke up and push them off. Patient himself states that he was sleeping and all of a sudden \"a group of girls ran into the room and surrounded me. \"  States that he feels fine and is in no acute distress at this time. Denies any symptoms whatsoever. Denies any headache, dizziness, fever, chest pain, cough, nausea, vomiting, abdominal pain, diarrhea, constipation, urinary symptoms. Review of Systems:   Please see HPI above. All bolded are positive. All un-bolded are negative.     Constitutional Symptoms: fever, chills, fatigue, generalized weakness, diaphoresis, increase in thirst, loss of appetite  Eyes: vision change   Ears, Nose, Mouth, Throat: hearing loss, nasal congestion, sores in the mouth  Cardiovascular: chest pain, chest heaviness, palpitations  Respiratory: shortness of breath, wheezing, coughing  Gastrointestinal: abdominal pain, nausea, vomiting, diarrhea, constipation, melena, hematochezia, hematemesis  Genitourinary: dysuria, hematuria, increased frequency  Musculoskeletal: lower extremity edema, myalgias, arthralgias, back pain  Integumentary: rashes, itching   Neurological: headache, lightheadedness, dizziness, confusion, syncope, numbness, tingling, focal weakness  Psychiatric: depression, suicidal ideation, anxiety  Endocrine: unintentional weight change  Hematologic/Lymphatic: lymphadenopathy, easy bruising, easy bleeding   Allergic/Immunologic: recurrent infections            --------------------------------------------- PAST HISTORY ---------------------------------------------  Past Medical History:  has a past medical history of Anemia in chronic kidney disease, Arthropathy, unspecified, Below knee amputation ([facility] Bifascicular block, Bipolar disorder, unspecified ([facility] Cellulitis, CHF (congestive heart failure) ([facility] Chronic kidney disease, stage 5 ([facility] COPD (chronic obstructive pulmonary disease) ([facility] Diabetes mellitus ([facility] Fascicular block, Generalized muscle weakness, Hyperlipidemia, Hypertension, Intracranial injury without loss of consciousness ([facility] Osteoporosis, Peripheral vascular disease ([facility] Personality disorder ([facility] Presence of intraocular lens, Right bundle branch block, Schizoaffective disorder ([facility] Splenomegaly, Unspecified age-related cataract, Venous insufficiency (chronic) (peripheral), and Vitamin D deficiency. Past Surgical History:  has a past surgical history that includes Upper gastrointestinal endoscopy (N/A, 9/4/2020). Social History:  reports that he has been smoking cigarettes. He started smoking about 54 years ago. He has never used smokeless tobacco. He reports previous alcohol use. He reports that he does not use drugs. Family History: family history includes Breast Cancer in his mother; High Blood Pressure in his father. The patients home medications have been reviewed. Allergies: Patient has no known allergies.     -------------------------------------------------- RESULTS -------------------------------------------------  All laboratory and radiology results have been personally reviewed by myself   LABS:  Results for orders placed or performed during the hospital encounter of 12/27/20   CBC auto differential   Result Value Ref Range    WBC 2.7 (L) 4.5 - 11.5 E9/L    RBC 2.14 (L) 3.80 - 5.80 E12/L    Hemoglobin 6.4 (LL) 12.5 - 16.5 g/dL    Hematocrit 21.0 (L) 37.0 - 54.0 %    MCV 98.1 80.0 - 99.9 fL    MCH 29.9 26.0 - 35.0 pg    MCHC 30.5 (L) 32.0 - 34.5 %    RDW 16.0 (H) 11.5 - 15.0 fL    Platelets 88 (L) 963 - 450 E9/L    MPV 10.2 7.0 - 12.0 fL    Neutrophils % 86.6 (H) 43.0 - 80.0 %    Immature Granulocytes % 0.4 0.0 - 5.0 %    Lymphocytes % 4.5 (L) 20.0 - 42.0 %    Monocytes % 7.8 2.0 - 12.0 %    Eosinophils % 0.7 0.0 - 6.0 %    Basophils % 0.0 0.0 - 2.0 %    Neutrophils Absolute 2.32 1.80 - 7.30 E9/L    Immature Granulocytes # 0.01 E9/L    Lymphocytes Absolute 0.12 (L) 1.50 - 4.00 E9/L    Monocytes Absolute 0.21 0.10 - 0.95 E9/L    Eosinophils Absolute 0.02 (L) 0.05 - 0.50 E9/L    Basophils Absolute 0.00 0.00 - 0.20 E9/L    Polychromasia 1+     Hypochromia 1+     Poikilocytes 1+     Schistocytes 1+     Ovalocytes 1+     Tear Drop Cells 1+     Basophilic Stippling 1+    Basic metabolic panel   Result Value Ref Range    Sodium 133 132 - 146 mmol/L    Potassium 4.6 3.5 - 5.0 mmol/L    Chloride 93 (L) 98 - 107 mmol/L    CO2 26 22 - 29 mmol/L    Anion Gap 14 7 - 16 mmol/L    Glucose 249 (H) 74 - 99 mg/dL    BUN 91 (H) 8 - 23 mg/dL    CREATININE 6.5 (H) 0.7 - 1.2 mg/dL    GFR Non-African American 9 >=60 mL/min/1.73    GFR African American 11     Calcium 7.9 (L) 8.6 - 10.2 mg/dL   Magnesium   Result Value Ref Range    Magnesium 1.9 1.6 - 2.6 mg/dL   Troponin   Result Value Ref Range    Troponin 0.10 (H) 0.00 - 0.03 ng/mL   Platelet Confirmation   Result Value Ref Range    Platelet Confirmation CONFIRMED    EKG 12 Lead   Result Value Ref Range    Ventricular Rate 89 BPM    Atrial Rate 89 BPM    P-R Interval 172 ms    QRS Duration 142 ms    Q-T Interval 408 ms    QTc Calculation (Bazett) 496 ms    P Axis 30 degrees    R Axis -54 degrees    T Axis 59 degrees   TYPE AND SCREEN   Result Value Ref Range    ABO/Rh A POS     Antibody Screen NEG    PREPARE RBC (CROSSMATCH), 1 Units   Result Value Ref Range    Product Code Blood Bank [id number]     Description Blood Bank Red Blood Cells, Leuko-reduced     Unit Number [id number]     Dispense Status Blood Bank issued        RADIOLOGY:  Interpreted by Radiologist.  XR CHEST PORTABLE   Final Result   Improvement in widespread airspace disease seen on the prior examination with   decreasing airspace disease in the right upper lobe and left perihilar region. Right pleural effusion, no significantly changed. Probable smaller left pleural effusion.          ------------------------- NURSING NOTES AND VITALS REVIEWED ---------------------------   The nursing notes within the ED encounter and vital signs as below have been reviewed. /67   Pulse 88   Temp 97.5 °F (36.4 °C) (Oral)   Resp 18   Ht 5' 10\" (1.778 m)   Wt 256 lb (116.1 kg)   SpO2 97%   BMI 36.73 kg/m²   Oxygen Saturation Interpretation: Normal      ---------------------------------------------------PHYSICAL EXAM--------------------------------------      Constitutional/General: Alert and oriented x3, well appearing, non toxic in NAD  Head: Normocephalic and atraumatic  Eyes: PERRL, EOMI  Mouth: Oropharynx clear, handling secretions, no trismus  Neck: Supple, full ROM,   Pulmonary: Lungs clear to auscultation bilaterally, no wheezes, rales, or rhonchi. Not in respiratory distress  Cardiovascular:  Regular rate and rhythm, no murmurs, gallops, or rubs. 2+ distal pulses  Abdomen: Soft, non tender, non distended, Hemoccult negative  Extremities: Right leg BKA. Otherwise warm and well perfused  Skin: warm and dry without rash  Neurologic: GCS 15, no focal deficit  Psych: Normal Affect    EKG: This EKG is signed and interpreted by me.     Rate: 89  Rhythm: Sinus  Interpretation: Premature atrial complexes noted, bifascicular block consisting of right bundle branch block and left anterior fascicular block, QTC prolonged at 406 ms  Comparison: stable as compared to patient's most recent EKG    ------------------------------ ED COURSE/MEDICAL DECISION MAKING----------------------  Medications   0.9 % sodium chloride bolus (has no administration in time range)   sodium chloride flush 0.9 % injection (has no administration in time range)         ED COURSE:       Medical Decision Making:    Patient presented to the ER today for questionable episode of unresponsiveness at nursing home. Patient denies any symptoms to me and states that he feels well. Labs do demonstrate that he is anemic with a hemoglobin of 6.4. He was ordered 1 unit packed red blood cells. Otherwise labs are noncontributory. EKG is unremarkable as well. Given that patient is anemic and he had an episode of supposed unresponsiveness and nursing I feel he would benefit from admission for at least 1 night of observation. I have spoken to BERNICE Tika Blanco who is agreeable to admission at this time. Patient is well. All questions been answered. Counseling: The emergency provider has spoken with the patient and discussed todays results, in addition to providing specific details for the plan of care and counseling regarding the diagnosis and prognosis. Questions are answered at this time and they are agreeable with the plan.      --------------------------------- IMPRESSION AND DISPOSITION ---------------------------------    IMPRESSION  1. Anemia, unspecified type        Current Discharge Medication List          DISPOSITION  Disposition: Admit to telemetry  Patient condition is stable      NOTE: This report was transcribed using voice recognition software.  Every effort was made to ensure accuracy; however, inadvertent computerized transcription errors may be present       Gladis Breaux DO  Resident  12/28/20 5271

## 2020-12-28 NOTE — DISCHARGE INSTR - COC
Continuity of Care Form    Patient Name: Arpan Estevez   :  1960  MRN:  [id number]    Admit date:  2020  Discharge date:  20    Code Status Order: Full Code   Advance Directives:   [facility] Documentation       Date/Time Healthcare Directive Type of Healthcare Directive Copy in [facility] [address] Agent's Name Healthcare Agent's Phone Number    20 0308  --  Health care treatment directive  --  --  Willma Tonny  [phone]    20 0249  Yes, patient has an advance directive for healthcare treatment  --  --  Legal Guardian  --  --            Admitting Physician:  Dalila Vega MD  PCP: Roxana Chacon DO    Discharging Nurse: Juan Jarvis Unit/Room#: 9205/5623-F  Discharging Unit Phone Number: [phone]    Emergency Contact:   Extended Emergency Contact Information  Primary Emergency Contact: [address] Phone: [phone]  Work Phone: [phone]  Relation: Legal Guardian    Past Surgical History:  Past Surgical History:   Procedure Laterality Date    LEG AMPUTATION BELOW KNEE Right     UPPER GASTROINTESTINAL ENDOSCOPY N/A 2020    EGD ESOPHAGOGASTRODUODENOSCOPY performed by Rebecca Ross MD at [address] [place] [address]       Immunization History: There is no immunization history on file for this patient.     Active Problems:  Patient Active Problem List   Diagnosis Code    GI bleed K92.2    Acute blood loss anemia D62    Essential hypertension I10    COPD (chronic obstructive pulmonary disease) ([facility]) J44.9    Uncontrolled diabetes mellitus ([facility]) E11.65    Tobacco abuse Z72.0    Depression F32.9    Psychiatric disorder F99    Diabetes mellitus type 2, uncontrolled ([facility] E11.65    Hyperlipidemia LDL goal <100 E78.5    CKD (chronic kidney disease) stage 4, GFR 15-29 ml/min ([facility]) N18.4    Acute kidney injury ([address] N17.9    Acute respiratory failure with hypoxia ([facility]) J96.01    Hypoxia R09.02    COVID-19 U07.1    Leg wound, left, subsequent encounter S81.802D    Anemia D64.9       Isolation/Infection:   Isolation            No Isolation          Patient Infection Status       Infection Onset Added Last Indicated Last Indicated By Review Planned Expiration Resolved Resolved By    None active    Resolved    COVID-19 20 Respiratory Panel, Molecular, with COVID-19 (Restricted: peds pts or suitable admitted adults)   20     COVID-19 Rule Out 20 Respiratory Panel, Molecular, with COVID-19 (Restricted: peds pts or suitable admitted adults) (Ordered)   20 Rule-Out Test Resulted    COVID-19 Rule Out 20 COVID-19 (Ordered)   20 Rule-Out Test Resulted            Nurse Assessment:  Last Vital Signs: BP (!) 152/79   Pulse 88   Temp 97.7 °F (36.5 °C) (Oral)   Resp 22   Ht 5' 10\" (1.778 m)   Wt 265 lb 4.8 oz (120.3 kg)   SpO2 100%   BMI 38.07 kg/m²     Last documented pain score (0-10 scale): Pain Level: 0  Last Weight:   Wt Readings from Last 1 Encounters:   20 265 lb 4.8 oz (120.3 kg)     Mental Status:  oriented and alert    IV Access:  - None    Nursing Mobility/ADLs:  Walking   Assisted  Transfer  Assisted  Bathing  Assisted  Dressing  Assisted  Toileting  Assisted  Feeding  Independent  Med Admin  Assisted  Med Delivery   whole    Wound Care Documentation and Therapy:  Wound 20 Coccyx (Active)   Dressing Status New dressing applied 20   Wound Cleansed Cleansed with saline 20   Dressing/Treatment Foam 20   Dressing Change Due 20   Wound Length (cm) 0.5 cm 20   Wound Width (cm) 0.5 cm 20   Wound Depth (cm) 0.02 cm 20   Wound Surface Area (cm^2) 0.25 cm^2 20   Wound Volume (cm^3) 0 cm^3 20 0230   Wound Assessment Erythema 20 0230   Drainage Amount None 20 0230 Vijaya-wound Assessment Blanchable erythema 12/28/20 0230   Number of days: 0       Wound 12/28/20 Other (Comment) Right;Posterior;Medial posterior medial stump (Active)   Dressing Status New dressing applied 12/28/20 0230   Wound Cleansed Cleansed with saline 12/28/20 0230   Dressing/Treatment Other (comment) 12/28/20 0230   Dressing Change Due 12/29/20 12/28/20 0230   Wound Length (cm) 2.5 cm 12/28/20 0230   Wound Width (cm) 2.5 cm 12/28/20 0230   Wound Depth (cm) 0.01 cm 12/28/20 0230   Wound Surface Area (cm^2) 6.25 cm^2 12/28/20 0230   Wound Volume (cm^3) 0.06 cm^3 12/28/20 0230   Wound Assessment Erythema;Dry 12/28/20 0230   Drainage Amount Small 12/28/20 0230   Drainage Description Sanguinous 12/28/20 0230   Odor None 12/28/20 0230   Vijaya-wound Assessment Dry/flaky 12/28/20 0230   Number of days: 0       Wound 12/28/20 Pretibial Left;Upper (Active)   Dressing Status New dressing applied 12/28/20 0230   Wound Cleansed Cleansed with saline 12/28/20 0230   Dressing Change Due 12/29/20 12/28/20 0230   Wound Length (cm) 2.5 cm 12/28/20 0230   Wound Width (cm) 6 cm 12/28/20 0230   Wound Depth (cm) 0.01 cm 12/28/20 0230   Wound Surface Area (cm^2) 15 cm^2 12/28/20 0230   Wound Volume (cm^3) 0.15 cm^3 12/28/20 0230   Wound Assessment Dry;Bleeding;Erythema 12/28/20 0230   Drainage Amount Small 12/28/20 0230   Drainage Description Sanguinous 12/28/20 0230   Odor None 12/28/20 0230   Number of days: 0       Wound 12/28/20 Pretibial Left;Mid (Active)   Dressing Status New dressing applied 12/28/20 0230   Wound Cleansed Cleansed with saline 12/28/20 0230   Dressing/Treatment Other (comment) 12/28/20 0230   Dressing Change Due 12/29/20 12/28/20 0230   Wound Length (cm) 6.5 cm 12/28/20 0230   Wound Width (cm) 6.5 cm 12/28/20 0230   Wound Depth (cm) 0.01 cm 12/28/20 0230   Wound Surface Area (cm^2) 42.25 cm^2 12/28/20 0230   Wound Volume (cm^3) 0.42 cm^3 12/28/20 0230   Wound Assessment Pink/red 12/28/20 0230   Drainage Amount Scant 12/28/20 0230   Drainage Description Serosanguinous 12/28/20 0230   Odor None 12/28/20 0230   Number of days: 0       Wound Pretibial Left; Lower 6 circular ulcerations (Active)   Dressing Status New dressing applied 12/28/20 0230   Wound Cleansed Cleansed with saline 12/28/20 0230   Dressing/Treatment Other (comment) 12/28/20 0230   Dressing Change Due 12/29/20 12/28/20 0230   Wound Length (cm) 7.5 cm 12/28/20 0230   Wound Width (cm) 7 cm 12/28/20 0230   Wound Depth (cm) 0.01 cm 12/28/20 0230   Wound Surface Area (cm^2) 52.5 cm^2 12/28/20 0230   Wound Volume (cm^3) 0.52 cm^3 12/28/20 0230   Wound Assessment Pink/red 12/28/20 0230   Drainage Amount Small 12/28/20 0230   Drainage Description Serosanguinous 12/28/20 0230   Odor None 12/28/20 0230   Vijaya-wound Assessment Edematous 12/28/20 0230   Number of days:        Wound  Right;Distal (Active)   Dressing Status New dressing applied 12/28/20 0230   Wound Cleansed Cleansed with saline 12/28/20 0230   Dressing/Treatment Dry dressing 12/28/20 0230   Dressing Change Due 12/29/20 12/28/20 0230   Wound Length (cm) 1 cm 12/28/20 0230   Wound Width (cm) 1 cm 12/28/20 0230   Wound Depth (cm) 0.1 cm 12/28/20 0230   Wound Surface Area (cm^2) 1 cm^2 12/28/20 0230   Wound Volume (cm^3) 0.1 cm^3 12/28/20 0230   Wound Assessment Dry;Pink/red 12/28/20 0230   Drainage Amount None 12/28/20 0230   Vijaya-wound Assessment Blanchable erythema;Dry/flaky 12/28/20 0230   Number of days:         Elimination:  Continence:   · Bowel: Yes  · Bladder: Yes  Urinary Catheter: None   Colostomy/Ileostomy/Ileal Conduit: No       Date of Last BM: ***    Intake/Output Summary (Last 24 hours) at 12/28/2020 1023  Last data filed at 12/28/2020 0515  Gross per 24 hour   Intake 350 ml   Output 625 ml   Net -275 ml     I/O last 3 completed shifts: In: 350 [Blood:350]  Out: [address] Concerns:      At Risk for Falls    Impairments/Disabilities:      Amputation - R AKA    Nutrition Therapy:  Current Nutrition Therapy:   - Oral Diet:  Renal    Routes of Feeding: Oral  Liquids: No Restrictions  Daily Fluid Restriction: no  Last Modified Barium Swallow with Video (Video Swallowing Test): not done    Treatments at the Time of Hospital Discharge:   Respiratory Treatments: ***  Oxygen Therapy:  is on oxygen at 5-6 L/min per nasal cannula. Ventilator:    - No ventilator support    Rehab Therapies: Physical Therapy and Occupational Therapy  Weight Bearing Status/Restrictions: No weight bearing restirctions  Other Medical Equipment (for information only, NOT a DME order):  wheelchair and hospital bed  Other Treatments: Cleanse Left lower leg wounds with normal saline, cover with opticell , 4x4, and wrap with kerlix. Change daily and PRN if saturated. Cleanse R posterior stump wound with normal saline, apply opticell and cover with stratasorb. Change daily and PRN.  Cleanse distal stump wound with normal saline, apply stratasorb daily and PRN  Patient's personal belongings (please select all that are sent with patient):  with patient     RN SIGNATURE:  Electronically signed by De Coles RN on 12/31/20 at 2:35 PM EST    CASE MANAGEMENT/SOCIAL WORK SECTION    Inpatient Status Date: 12/28/2020    Readmission Risk Assessment Score:  Readmission Risk              Risk of Unplanned Readmission:        34           Discharging to Facility/ Agency   · Name: [facility]  · Address: [facility] [address] [place] [address]  · Phone: [phone]  · Fax: [phone]    Dialysis Facility (if applicable)   · Name:  · Address:  · Dialysis Schedule:  · Phone:  · Fax:    / signature: Electronically signed by JS Farrell on 12/28/20 at 10:24 AM EST    PHYSICIAN SECTION    Prognosis: Good    Condition at Discharge: Stable    Rehab Potential (if transferring to Rehab): Good    Recommended Labs or Other Treatments After Discharge: Check H/H in 1 week    Physician Certification: I certify the above information and transfer of Jose Alejandro Manley  is necessary for the continuing treatment of the diagnosis listed and that he requires [facility] for greater 30 days.      Update Admission H&P: No change in H&P    PHYSICIAN SIGNATURE:  Electronically signed by Ace Michaels MD on 12/31/20 at 1:27 PM EST

## 2020-12-28 NOTE — ED NOTES
RN faxed SBAR to floor. Confirmation received and spoke with staff. Pt ready for transport to room.        Gabriel Warren RN  12/28/20 6562

## 2020-12-29 LAB
ANION GAP SERPL CALCULATED.3IONS-SCNC: 11 MMOL/L (ref 7–16)
ANISOCYTOSIS: ABNORMAL
BASOPHILS ABSOLUTE: 0 E9/L (ref 0–0.2)
BASOPHILS RELATIVE PERCENT: 0 % (ref 0–2)
BUN BLDV-MCNC: 97 MG/DL (ref 8–23)
CALCIUM SERPL-MCNC: 8.1 MG/DL (ref 8.6–10.2)
CHLORIDE BLD-SCNC: 94 MMOL/L (ref 98–107)
CO2: 28 MMOL/L (ref 22–29)
CREAT SERPL-MCNC: 6.7 MG/DL (ref 0.7–1.2)
EOSINOPHILS ABSOLUTE: 0.09 E9/L (ref 0.05–0.5)
EOSINOPHILS RELATIVE PERCENT: 3.5 % (ref 0–6)
FERRITIN: 404 NG/ML
GFR AFRICAN AMERICAN: 10
GFR NON-AFRICAN AMERICAN: 8 ML/MIN/1.73
GLUCOSE BLD-MCNC: 222 MG/DL (ref 74–99)
HCT VFR BLD CALC: 23.5 % (ref 37–54)
HEMOGLOBIN: 7.3 G/DL (ref 12.5–16.5)
IMMATURE GRANULOCYTES #: 0.02 E9/L
IMMATURE GRANULOCYTES %: 0.8 % (ref 0–5)
IRON SATURATION: 13 % (ref 20–55)
IRON: 33 MCG/DL (ref 59–158)
LYMPHOCYTES ABSOLUTE: 0.28 E9/L (ref 1.5–4)
LYMPHOCYTES RELATIVE PERCENT: 10.9 % (ref 20–42)
MCH RBC QN AUTO: 30 PG (ref 26–35)
MCHC RBC AUTO-ENTMCNC: 31.1 % (ref 32–34.5)
MCV RBC AUTO: 96.7 FL (ref 80–99.9)
METER GLUCOSE: 187 MG/DL (ref 74–99)
METER GLUCOSE: 228 MG/DL (ref 74–99)
METER GLUCOSE: 67 MG/DL (ref 74–99)
METER GLUCOSE: 76 MG/DL (ref 74–99)
METER GLUCOSE: 80 MG/DL (ref 74–99)
MONOCYTES ABSOLUTE: 0.26 E9/L (ref 0.1–0.95)
MONOCYTES RELATIVE PERCENT: 10.1 % (ref 2–12)
NEUTROPHILS ABSOLUTE: 1.93 E9/L (ref 1.8–7.3)
NEUTROPHILS RELATIVE PERCENT: 74.7 % (ref 43–80)
OVALOCYTES: ABNORMAL
PARATHYROID HORMONE INTACT: 252 PG/ML (ref 15–65)
PDW BLD-RTO: 16.7 FL (ref 11.5–15)
PLATELET # BLD: 103 E9/L (ref 130–450)
PMV BLD AUTO: 10.6 FL (ref 7–12)
POIKILOCYTES: ABNORMAL
POLYCHROMASIA: ABNORMAL
POTASSIUM REFLEX MAGNESIUM: 4.3 MMOL/L (ref 3.5–5)
RBC # BLD: 2.43 E12/L (ref 3.8–5.8)
SODIUM BLD-SCNC: 133 MMOL/L (ref 132–146)
TOTAL IRON BINDING CAPACITY: 245 MCG/DL (ref 250–450)
VITAMIN D 25-HYDROXY: 25 NG/ML (ref 30–100)
WBC # BLD: 2.6 E9/L (ref 4.5–11.5)

## 2020-12-29 PROCEDURE — 83550 IRON BINDING TEST: CPT

## 2020-12-29 PROCEDURE — 86334 IMMUNOFIX E-PHORESIS SERUM: CPT

## 2020-12-29 PROCEDURE — 36415 COLL VENOUS BLD VENIPUNCTURE: CPT

## 2020-12-29 PROCEDURE — 6370000000 HC RX 637 (ALT 250 FOR IP): Performed by: INTERNAL MEDICINE

## 2020-12-29 PROCEDURE — 83970 ASSAY OF PARATHORMONE: CPT

## 2020-12-29 PROCEDURE — 80048 BASIC METABOLIC PNL TOTAL CA: CPT

## 2020-12-29 PROCEDURE — 82962 GLUCOSE BLOOD TEST: CPT

## 2020-12-29 PROCEDURE — 83540 ASSAY OF IRON: CPT

## 2020-12-29 PROCEDURE — 82306 VITAMIN D 25 HYDROXY: CPT

## 2020-12-29 PROCEDURE — 2060000000 HC ICU INTERMEDIATE R&B

## 2020-12-29 PROCEDURE — 82728 ASSAY OF FERRITIN: CPT

## 2020-12-29 PROCEDURE — 2700000000 HC OXYGEN THERAPY PER DAY

## 2020-12-29 PROCEDURE — 2580000003 HC RX 258: Performed by: PHYSICIAN ASSISTANT

## 2020-12-29 PROCEDURE — 97110 THERAPEUTIC EXERCISES: CPT

## 2020-12-29 PROCEDURE — 84166 PROTEIN E-PHORESIS/URINE/CSF: CPT

## 2020-12-29 PROCEDURE — 85025 COMPLETE CBC W/AUTO DIFF WBC: CPT

## 2020-12-29 PROCEDURE — 6370000000 HC RX 637 (ALT 250 FOR IP): Performed by: PHYSICIAN ASSISTANT

## 2020-12-29 PROCEDURE — 84165 PROTEIN E-PHORESIS SERUM: CPT

## 2020-12-29 PROCEDURE — 6360000002 HC RX W HCPCS: Performed by: PHYSICIAN ASSISTANT

## 2020-12-29 RX ORDER — SEVELAMER CARBONATE 800 MG/1
1600 TABLET, FILM COATED ORAL
Qty: 90 TABLET | Refills: 3 | Status: SHIPPED | OUTPATIENT
Start: 2020-12-29

## 2020-12-29 RX ADMIN — HEPARIN SODIUM 5000 UNITS: 10000 INJECTION INTRAVENOUS; SUBCUTANEOUS at 06:27

## 2020-12-29 RX ADMIN — PANTOPRAZOLE SODIUM 40 MG: 40 TABLET, DELAYED RELEASE ORAL at 16:36

## 2020-12-29 RX ADMIN — CLONIDINE HYDROCHLORIDE 0.1 MG: 0.1 TABLET ORAL at 14:57

## 2020-12-29 RX ADMIN — HEPARIN SODIUM 5000 UNITS: 10000 INJECTION INTRAVENOUS; SUBCUTANEOUS at 22:25

## 2020-12-29 RX ADMIN — FUROSEMIDE 20 MG: 20 TABLET ORAL at 08:47

## 2020-12-29 RX ADMIN — DOXEPIN HYDROCHLORIDE 25 MG: 25 CAPSULE ORAL at 20:45

## 2020-12-29 RX ADMIN — FUROSEMIDE 20 MG: 20 TABLET ORAL at 20:45

## 2020-12-29 RX ADMIN — MICONAZOLE NITRATE: 2 OINTMENT TOPICAL at 08:48

## 2020-12-29 RX ADMIN — CLONIDINE HYDROCHLORIDE 0.1 MG: 0.1 TABLET ORAL at 20:45

## 2020-12-29 RX ADMIN — DILTIAZEM HYDROCHLORIDE 240 MG: 240 CAPSULE, COATED, EXTENDED RELEASE ORAL at 08:46

## 2020-12-29 RX ADMIN — SODIUM CHLORIDE, PRESERVATIVE FREE 10 ML: 5 INJECTION INTRAVENOUS at 08:48

## 2020-12-29 RX ADMIN — SEVELAMER CARBONATE 1600 MG: 800 TABLET, FILM COATED ORAL at 08:47

## 2020-12-29 RX ADMIN — CLONIDINE HYDROCHLORIDE 0.1 MG: 0.1 TABLET ORAL at 08:47

## 2020-12-29 RX ADMIN — SEVELAMER CARBONATE 1600 MG: 800 TABLET, FILM COATED ORAL at 16:36

## 2020-12-29 RX ADMIN — METOLAZONE 5 MG: 5 TABLET ORAL at 08:47

## 2020-12-29 RX ADMIN — FENOFIBRATE 54 MG: 54 TABLET ORAL at 08:46

## 2020-12-29 RX ADMIN — SODIUM CHLORIDE, PRESERVATIVE FREE 10 ML: 5 INJECTION INTRAVENOUS at 20:45

## 2020-12-29 RX ADMIN — ARIPIPRAZOLE 2.5 MG: 5 TABLET ORAL at 08:46

## 2020-12-29 RX ADMIN — INSULIN GLARGINE 32 UNITS: 100 INJECTION, SOLUTION SUBCUTANEOUS at 08:53

## 2020-12-29 RX ADMIN — INSULIN LISPRO 1 UNITS: 100 INJECTION, SOLUTION INTRAVENOUS; SUBCUTANEOUS at 11:04

## 2020-12-29 RX ADMIN — HEPARIN SODIUM 5000 UNITS: 10000 INJECTION INTRAVENOUS; SUBCUTANEOUS at 14:57

## 2020-12-29 RX ADMIN — PANTOPRAZOLE SODIUM 40 MG: 40 TABLET, DELAYED RELEASE ORAL at 06:27

## 2020-12-29 RX ADMIN — CITALOPRAM HYDROBROMIDE 20 MG: 20 TABLET ORAL at 08:47

## 2020-12-29 RX ADMIN — SEVELAMER CARBONATE 1600 MG: 800 TABLET, FILM COATED ORAL at 12:00

## 2020-12-29 RX ADMIN — MICONAZOLE NITRATE: 2 OINTMENT TOPICAL at 20:45

## 2020-12-29 RX ADMIN — INSULIN LISPRO 2 UNITS: 100 INJECTION, SOLUTION INTRAVENOUS; SUBCUTANEOUS at 06:27

## 2020-12-29 ASSESSMENT — PAIN SCALES - GENERAL
PAINLEVEL_OUTOF10: 0

## 2020-12-29 NOTE — PROGRESS NOTES
Venous insufficiency (chronic) (peripheral) 2007    Vitamin D deficiency 2018       Past Surgical History:   Procedure Laterality Date    LEG AMPUTATION BELOW KNEE Right     UPPER GASTROINTESTINAL ENDOSCOPY N/A 9/4/2020    EGD ESOPHAGOGASTRODUODENOSCOPY performed by Ynes Mast MD at [facility] ENDOSCOPY       Family History   Problem Relation Age of Onset    Breast Cancer Mother     High Blood Pressure Father         reports that he has quit smoking. His smoking use included cigarettes. He started smoking about 54 years ago. He has never used smokeless tobacco. He reports previous alcohol use. He reports that he does not use drugs. Allergies:  Patient has no known allergies.     Current Medications:        ARIPiprazole (ABILIFY) tablet 2.5 mg, Daily      citalopram (CELEXA) tablet 20 mg, Daily      cloNIDine (CATAPRES) tablet 0.1 mg, TID      doxepin (SINEQUAN) capsule 25 mg, Nightly      furosemide (LASIX) tablet 20 mg, BID      metOLazone (ZAROXOLYN) tablet 5 mg, Daily      pantoprazole (PROTONIX) tablet 40 mg, BID AC      insulin lispro (HUMALOG) injection vial 0-6 Units, TID WC      insulin lispro (HUMALOG) injection vial 0-3 Units, Nightly      glucose (GLUTOSE) 40 % oral gel 15 g, PRN      dextrose 50 % IV solution, PRN      glucagon (rDNA) injection 1 mg, PRN      dextrose 5 % solution, PRN      sodium chloride flush 0.9 % injection 10 mL, 2 times per day      sodium chloride flush 0.9 % injection 10 mL, PRN      acetaminophen (TYLENOL) tablet 650 mg, Q6H PRN    Or      acetaminophen (TYLENOL) suppository 650 mg, Q6H PRN      polyethylene glycol (GLYCOLAX) packet 17 g, Daily PRN      trimethobenzamide (TIGAN) injection 200 mg, Q6H PRN      heparin (porcine) injection 5,000 Units, 3 times per day      dilTIAZem (CARDIZEM CD) extended release capsule 240 mg, Daily      fenofibrate (TRICOR) tablet 54 mg, Daily      insulin glargine (LANTUS) injection vial 32 Units, QAM     miconazole nitrate 2 % ointment, BID      epoetin juliane-epbx (RETACRIT) injection 5,000 Units, Once per day on Mon Wed Fri      sevelamer (RENVELA) tablet 1,600 mg, TID WC      0.9 % sodium chloride bolus, Once        Review of Systems:   Constitutional: no fevers , no chills , feels ok   Eyes: no eye pain , no itching , no drainage  Ears, nose, mouth, throat, and face: no ear ,nose pain , hearing is ok ,no nasal drainage   Respiratory: no sob ,no cough ,no wheezing . Cardiovascular: no chest pain , no palpitation ,no sob . Gastrointestinal: no nausea, vomiting , constipation , no abdominal pain . Genitourinary:no urinary retention , no burning , dysuria . No polyuria   Hematologic/lymphatic: no bleeding , no cougulation issues . Musculoskeletal:no joint pain , no swelling . Neurological: no headaches ,no weakness , no numbness . Endocrine: no thirst , no weight issues . Physical exam:   Vital signs BP (!) 158/84   Pulse 88   Temp 98.2 °F (36.8 °C) (Oral)   Resp 18   Ht 5' 10\" (1.778 m)   Wt 273 lb 11.2 oz (124.1 kg)   SpO2 92%   BMI 39.27 kg/m²   Gen : NAD , appropriate for stated age . Head : at , nc   Neck : supple , no thyromegaly noted . Eyes : EOMI , PERRLA   CV : RRR , No M/R/G . Lungs: CTAB , no wheezing , good flow heard b/l   Abd : soft , NT , BS + , No Organomegaly appreciated . Skin : soft, dry . Neuro : CN  II-XII grossly intact , no focal neurologic deficit . Psych : cooperative .      Data:   Labs:  CBC with Differential:    Lab Results   Component Value Date    WBC 2.6 12/29/2020    RBC 2.43 12/29/2020    HGB 7.3 12/29/2020    HCT 23.5 12/29/2020     12/29/2020    MCV 96.7 12/29/2020    MCH 30.0 12/29/2020    MCHC 31.1 12/29/2020    RDW 16.7 12/29/2020    NRBC 0.0 11/17/2020    LYMPHOPCT 10.9 12/29/2020    MONOPCT 10.1 12/29/2020    BASOPCT 0.0 12/29/2020    MONOSABS 0.26 12/29/2020    LYMPHSABS 0.28 12/29/2020    EOSABS 0.09 12/29/2020    BASOSABS 0.00 12/29/2020     CMP:    Lab Results   Component Value Date     12/29/2020    K 4.7 12/28/2020    K 4.3 12/05/2020    CL 94 12/29/2020    CO2 28 12/29/2020    BUN 97 12/29/2020    CREATININE 6.7 12/29/2020    GFRAA 10 12/29/2020    LABGLOM 8 12/29/2020    GLUCOSE 222 12/29/2020    PROT 6.5 12/07/2020    LABALBU 3.1 12/07/2020    CALCIUM 8.1 12/29/2020    BILITOT 0.3 12/07/2020    ALKPHOS 40 12/07/2020    AST 16 12/07/2020    ALT 15 12/07/2020     Ionized Calcium:  No results found for: IONCA  Magnesium:    Lab Results   Component Value Date    MG 1.9 12/27/2020     Phosphorus:    Lab Results   Component Value Date    PHOS 5.6 12/08/2020     U/A:  No results found for: NITRITE, COLORU, PHUR, LABCAST, WBCUA, RBCUA, MUCUS, TRICHOMONAS, YEAST, BACTERIA, CLARITYU, SPECGRAV, LEUKOCYTESUR, UROBILINOGEN, BILIRUBINUR, BLOODU, GLUCOSEU, AMORPHOUS  Microalbumen/Creatinine ratio:  No components found for: RUCREAT  Iron Saturation:  No components found for: PERCENTFE  TIBC:    Lab Results   Component Value Date    TIBC 240 12/06/2020     FERRITIN:    Lab Results   Component Value Date    FERRITIN 511 12/05/2020        Imaging:  XR CHEST PORTABLE   Final Result   Improvement in widespread airspace disease seen on the prior examination with   decreasing airspace disease in the right upper lobe and left perihilar region. Right pleural effusion, no significantly changed. Probable smaller left pleural effusion. Assessment    Chronic kidney disease stage V : Dialysis discussion has taken place on last admission by dr Kristine Gonzalez with him and with his guardian and decision was made based on pt wishes and the fact this will change his life style not to proceed with HD or AVF . He continue to be not uremic clinically . No current need for HD . Will f/u closely     Anaemia of chronic disease : s/p RBC transfusion . Will start retacrit . Check iron studies (results will be affected by transfusion) .  Also check spep/upep(pending)    Mineral bone disease : start sevelamer 1600 mg tid with meals .  Check pth ,vit d (pedning)    Hypertension     IDDM       Thank you Dr. Kuldip Gotti DO for allowing us to participate in care of Milly Oliveros           Electronically signed by Aden Willson MD on 12/29/2020 at 12:12 PM

## 2020-12-29 NOTE — CARE COORDINATION
Social Work discharge planning   SW noted discharge order. SW called RN Tai Bravo, who advised RN will call SW back. Electronically signed by Rasta Cazares on 12/29/2020 at 12:07 PM     Addendum-    RN advised Mikel that discharge cancelled for today.   Electronically signed by Rasta Cazares on 12/29/2020 at 1:15 PM

## 2020-12-29 NOTE — PROGRESS NOTES
Subjective:    Patient seen and examined at bedside, no complaints today. Hb >7    Objective:    /70   Pulse 72   Temp 97.5 °F (36.4 °C) (Oral)   Resp 18   Ht 5' 10\" (1.778 m)   Wt 273 lb 11.2 oz (124.1 kg)   SpO2 92%   BMI 39.27 kg/m²     Current medications that patient is taking have been reviewed. Heart:  RRR, no murmurs, gallops, or rubs. Lungs:  CTA bilaterally, no wheeze, rales or rhonchi  Abd: bowel sounds present, soft, nontender, nondistended, no masses  Extrem:  No cyanosis or edema    CBC:   Lab Results   Component Value Date    WBC 2.6 12/29/2020    RBC 2.43 12/29/2020    HGB 7.3 12/29/2020    HCT 23.5 12/29/2020    MCV 96.7 12/29/2020    MCH 30.0 12/29/2020    MCHC 31.1 12/29/2020    RDW 16.7 12/29/2020     12/29/2020    MPV 10.6 12/29/2020     BMP:    Lab Results   Component Value Date     12/29/2020    K 4.3 12/29/2020    CL 94 12/29/2020    CO2 28 12/29/2020    BUN 97 12/29/2020    LABALBU 3.1 12/07/2020    CREATININE 6.7 12/29/2020    CALCIUM 8.1 12/29/2020    GFRAA 10 12/29/2020    LABGLOM 8 12/29/2020    GLUCOSE 222 12/29/2020        Assessment:    Patient Active Problem List   Diagnosis    GI bleed    Acute blood loss anemia    Essential hypertension    [facility]    [facility]    Tobacco abuse    Depression    Psychiatric disorder    Diabetes mellitus type 2, uncontrolled ([facility]    Hyperlipidemia LDL goal <100    CKD (chronic kidney disease) stage 4, GFR 15-29 ml/min ([facility])    Acute kidney injury ([facility]    Acute respiratory failure with hypoxia ([facility])    Hypoxia    COVID-19    Leg wound, left, subsequent encounter    Anemia       Plan:    1. Anemia - no evidence of bleeding               Likely AoCD from CKD5              Responded to 2u PRBC  2. CKD 5              Follows with nephrologist Dr. Ruby Gonzalez              Does not want HD   SPEP/UPEP vit D pending  3.  HTN - continue with clonidine + metaolazone + lasix  4.  GERD - PPI    Susannah Michelle    3:39 PM  12/29/2020

## 2020-12-29 NOTE — PROGRESS NOTES
Occupational Therapy  OT BEDSIDE TREATMENT NOTE      Date:2020  Patient Name: Vandana Marshall  MRN: [id number]  : 1960  Room: [address] [place] [address]O     Referring Provider: MARTIN Gibbons  Evaluating OT: Bhavik Leo. Masha OTR/L - [id number]     AM-PAC Daily Activity Raw Score: 15/24      Recommended Adaptive Equipment: Continue to assess.      Diagnosis: Leg wound, left, subsequent encounter [S81.802D]  Anemia [D64.9]      Pertinent Medical History: R AKA, CHF, COPD, DM, HTN, osteoporosis, schizoaffective disorder, personality disorder, CKD     Precautions: falls, R AKA (does not wear prosthesis), O2 via nasal cannula, skin integrity     Home Living: Patient is a resident of [facility].     Prior Level of Function (PLOF): Patient reported that he was mostly independent with ADLs. Patient stated that he was able to perform functional transfers independently prior to this hospitalization. Patient is a questionable historian.     Pain Level: No c/o pain  Cognition: Patient alert and oriented grossly with cues for safety provided     Functional Assessment:    Initial Eval Status  Date: 2020 Treatment Status  Date: 20 Short Term Goals   Feeding Setup       Grooming SBA   Setup  (seated)   UB Dressing Min A   Setup   LB Dressing Max A   Min A - with use of AE, as needed/appropriate   Bathing Max A   Min A - with use of AE/DME, as needed/appropriate   Toileting Max A Pt declined need for toileting at this time  Min A   Bed Mobility  Not assessed.        Functional Transfers Sit-to-Stand: Mod A   from EOB  Patient declined completion of stand-pivot transfer into bedside chair. STS: Max A x2 for partial stand from arm chair  SPT: Mod A chair to bed without AD. See comments CGA   Functional Mobility Not assessed.   N/A   Balance Sitting: Good  (at EOB)  Standing: Poor+  (without device)  Sitting: Sup  Standing: Mod A Fair dynamic standing balance during completion of ADLs and other functional tasks. awareness and safety during transfers and ADLs   []? ? Splinting/positioning needs to maintain joint/skin integrity and prevent contractures   [x]? ? Therapeutic activity to improve functional performance during ADLs/IADLs  [x]? ? Therapeutic exercise to improve tolerance and functional strength for ADLs/IADLs  [x]? ? Balance retraining/tolerance tasks for facilitation of postural control with dynamic challenges during ADLs   [x]? ? Neuromuscular re-education: facilitate righting/equilibrium reactions, midline orientation, scapular stability/mobility, normalize muscle tone, and facilitate active functional movement/attention  []? ? Delirium prevention/treatment   []? Positioning to improve functional independence and prevent skin breakdown   []? ?Other:     Treatment Charges: Mins Units   Ther Ex  [id number] 10 1   Manual Therapy 61870     Thera Activities 02339 6 0   ADL/Home Mgt [id number]     Neuro Re-ed 87045     Group Therapy      Orthotic manage/training  [id number]     Non-Billable Time       Time In: 2:30  Time Out: 2:46  Total Time: Erik George 66 HENSON/L [id number]

## 2020-12-30 LAB
ADDENDUM ELECTROPHORESIS URINE RANDOM: NORMAL
ADDENDUM ELECTROPHORESIS URINE RANDOM: NORMAL
ALBUMIN SERPL-MCNC: 2 G/DL (ref 3.5–4.7)
ALPHA-1-GLOBULIN: 0.5 G/DL (ref 0.2–0.4)
ALPHA-2-GLOBULIN: 0.7 G/FL (ref 0.5–1)
ANION GAP SERPL CALCULATED.3IONS-SCNC: 11 MMOL/L (ref 7–16)
ANISOCYTOSIS: ABNORMAL
B.E.: -0.2 MMOL/L (ref -3–3)
BASOPHILS ABSOLUTE: 0 E9/L (ref 0–0.2)
BASOPHILS RELATIVE PERCENT: 0 % (ref 0–2)
BETA GLOBULIN: 1 G/DL (ref 0.8–1.3)
BUN BLDV-MCNC: 100 MG/DL (ref 8–23)
CALCIUM SERPL-MCNC: 8.2 MG/DL (ref 8.6–10.2)
CHLORIDE BLD-SCNC: 96 MMOL/L (ref 98–107)
CO2: 27 MMOL/L (ref 22–29)
COHB: 1.9 % (ref 0–1.5)
CREAT SERPL-MCNC: 6.9 MG/DL (ref 0.7–1.2)
CRITICAL: ABNORMAL
DATE ANALYZED: ABNORMAL
DATE OF COLLECTION: ABNORMAL
ELECTROPHORESIS: ABNORMAL
EOSINOPHILS ABSOLUTE: 0.08 E9/L (ref 0.05–0.5)
EOSINOPHILS RELATIVE PERCENT: 3 % (ref 0–6)
GAMMA GLOBULIN: 1.5 G/DL (ref 0.7–1.6)
GFR AFRICAN AMERICAN: 10
GFR NON-AFRICAN AMERICAN: 8 ML/MIN/1.73
GLUCOSE BLD-MCNC: 38 MG/DL (ref 74–99)
HCO3: 26.5 MMOL/L (ref 22–26)
HCT VFR BLD CALC: 24.4 % (ref 37–54)
HEMOGLOBIN: 7.3 G/DL (ref 12.5–16.5)
HHB: 4.5 % (ref 0–5)
IMMUNOFIXATION RESULT, SERUM: NORMAL
IMMUNOFIXATION URINE: NORMAL
LAB: ABNORMAL
LYMPHOCYTES ABSOLUTE: 0.47 E9/L (ref 1.5–4)
LYMPHOCYTES RELATIVE PERCENT: 18 % (ref 20–42)
Lab: ABNORMAL
MCH RBC QN AUTO: 29.9 PG (ref 26–35)
MCHC RBC AUTO-ENTMCNC: 29.9 % (ref 32–34.5)
MCV RBC AUTO: 100 FL (ref 80–99.9)
METER GLUCOSE: 109 MG/DL (ref 74–99)
METER GLUCOSE: 112 MG/DL (ref 74–99)
METER GLUCOSE: 142 MG/DL (ref 74–99)
METER GLUCOSE: 163 MG/DL (ref 74–99)
METER GLUCOSE: 178 MG/DL (ref 74–99)
METER GLUCOSE: 274 MG/DL (ref 74–99)
METER GLUCOSE: 70 MG/DL (ref 74–99)
METER GLUCOSE: 81 MG/DL (ref 74–99)
METER GLUCOSE: <40 MG/DL (ref 74–99)
METHB: 0.5 % (ref 0–1.5)
MODE: ABNORMAL
MONOCYTES ABSOLUTE: 0.08 E9/L (ref 0.1–0.95)
MONOCYTES RELATIVE PERCENT: 3 % (ref 2–12)
NEUTROPHILS ABSOLUTE: 1.98 E9/L (ref 1.8–7.3)
NEUTROPHILS RELATIVE PERCENT: 76 % (ref 43–80)
O2 CONTENT: 10.6 ML/DL
O2 SATURATION: 95.4 % (ref 92–98.5)
O2HB: 93.1 % (ref 94–97)
OPERATOR ID: 3342
PATIENT TEMP: 37 C
PCO2: 54.9 MMHG (ref 35–45)
PDW BLD-RTO: 16.6 FL (ref 11.5–15)
PH BLOOD GAS: 7.3 (ref 7.35–7.45)
PLATELET # BLD: 118 E9/L (ref 130–450)
PMV BLD AUTO: 10.2 FL (ref 7–12)
PO2: 87.7 MMHG (ref 75–100)
POTASSIUM REFLEX MAGNESIUM: 4.2 MMOL/L (ref 3.5–5)
RBC # BLD: 2.44 E12/L (ref 3.8–5.8)
SODIUM BLD-SCNC: 134 MMOL/L (ref 132–146)
SOURCE, BLOOD GAS: ABNORMAL
THB: 8 G/DL (ref 11.5–16.5)
TIME ANALYZED: 1344
TOTAL PROTEIN: 5.8 G/DL (ref 6.4–8.3)
WBC # BLD: 2.6 E9/L (ref 4.5–11.5)

## 2020-12-30 PROCEDURE — 6370000000 HC RX 637 (ALT 250 FOR IP): Performed by: INTERNAL MEDICINE

## 2020-12-30 PROCEDURE — 6360000002 HC RX W HCPCS: Performed by: INTERNAL MEDICINE

## 2020-12-30 PROCEDURE — 36415 COLL VENOUS BLD VENIPUNCTURE: CPT

## 2020-12-30 PROCEDURE — 2700000000 HC OXYGEN THERAPY PER DAY

## 2020-12-30 PROCEDURE — 2060000000 HC ICU INTERMEDIATE R&B

## 2020-12-30 PROCEDURE — 80048 BASIC METABOLIC PNL TOTAL CA: CPT

## 2020-12-30 PROCEDURE — 82805 BLOOD GASES W/O2 SATURATION: CPT

## 2020-12-30 PROCEDURE — 2580000003 HC RX 258: Performed by: INTERNAL MEDICINE

## 2020-12-30 PROCEDURE — 6370000000 HC RX 637 (ALT 250 FOR IP): Performed by: PHYSICIAN ASSISTANT

## 2020-12-30 PROCEDURE — 94660 CPAP INITIATION&MGMT: CPT

## 2020-12-30 PROCEDURE — 6360000002 HC RX W HCPCS: Performed by: PHYSICIAN ASSISTANT

## 2020-12-30 PROCEDURE — 85025 COMPLETE CBC W/AUTO DIFF WBC: CPT

## 2020-12-30 PROCEDURE — 2580000003 HC RX 258: Performed by: PHYSICIAN ASSISTANT

## 2020-12-30 PROCEDURE — 82962 GLUCOSE BLOOD TEST: CPT

## 2020-12-30 RX ORDER — CALCITRIOL 0.25 UG/1
0.25 CAPSULE, LIQUID FILLED ORAL EVERY OTHER DAY
Status: DISCONTINUED | OUTPATIENT
Start: 2020-12-30 | End: 2020-12-31 | Stop reason: HOSPADM

## 2020-12-30 RX ORDER — FERROUS SULFATE 325(65) MG
325 TABLET ORAL
Qty: 30 TABLET | Refills: 0 | Status: SHIPPED | OUTPATIENT
Start: 2020-12-30

## 2020-12-30 RX ADMIN — MICONAZOLE NITRATE: 2 OINTMENT TOPICAL at 20:03

## 2020-12-30 RX ADMIN — SODIUM CHLORIDE, PRESERVATIVE FREE 10 ML: 5 INJECTION INTRAVENOUS at 08:16

## 2020-12-30 RX ADMIN — HEPARIN SODIUM 5000 UNITS: 10000 INJECTION INTRAVENOUS; SUBCUTANEOUS at 12:38

## 2020-12-30 RX ADMIN — SEVELAMER CARBONATE 1600 MG: 800 TABLET, FILM COATED ORAL at 17:39

## 2020-12-30 RX ADMIN — DOXEPIN HYDROCHLORIDE 25 MG: 25 CAPSULE ORAL at 22:28

## 2020-12-30 RX ADMIN — DILTIAZEM HYDROCHLORIDE 240 MG: 240 CAPSULE, COATED, EXTENDED RELEASE ORAL at 08:16

## 2020-12-30 RX ADMIN — HEPARIN SODIUM 5000 UNITS: 10000 INJECTION INTRAVENOUS; SUBCUTANEOUS at 22:28

## 2020-12-30 RX ADMIN — FUROSEMIDE 20 MG: 20 TABLET ORAL at 20:00

## 2020-12-30 RX ADMIN — METOLAZONE 5 MG: 5 TABLET ORAL at 08:15

## 2020-12-30 RX ADMIN — DEXTROSE MONOHYDRATE 12.5 G: 25 INJECTION, SOLUTION INTRAVENOUS at 13:45

## 2020-12-30 RX ADMIN — EPOETIN ALFA-EPBX 2000 UNITS: 2000 INJECTION, SOLUTION INTRAVENOUS; SUBCUTANEOUS at 12:34

## 2020-12-30 RX ADMIN — DEXTROSE MONOHYDRATE 12.5 G: 25 INJECTION, SOLUTION INTRAVENOUS at 08:30

## 2020-12-30 RX ADMIN — EPOETIN ALFA-EPBX 3000 UNITS: 3000 INJECTION, SOLUTION INTRAVENOUS; SUBCUTANEOUS at 12:34

## 2020-12-30 RX ADMIN — CLONIDINE HYDROCHLORIDE 0.1 MG: 0.1 TABLET ORAL at 20:00

## 2020-12-30 RX ADMIN — CLONIDINE HYDROCHLORIDE 0.1 MG: 0.1 TABLET ORAL at 12:40

## 2020-12-30 RX ADMIN — PANTOPRAZOLE SODIUM 40 MG: 40 TABLET, DELAYED RELEASE ORAL at 17:39

## 2020-12-30 RX ADMIN — PANTOPRAZOLE SODIUM 40 MG: 40 TABLET, DELAYED RELEASE ORAL at 05:55

## 2020-12-30 RX ADMIN — HEPARIN SODIUM 5000 UNITS: 10000 INJECTION INTRAVENOUS; SUBCUTANEOUS at 05:55

## 2020-12-30 RX ADMIN — ARIPIPRAZOLE 2.5 MG: 5 TABLET ORAL at 08:16

## 2020-12-30 RX ADMIN — FENOFIBRATE 54 MG: 54 TABLET ORAL at 08:15

## 2020-12-30 RX ADMIN — CITALOPRAM HYDROBROMIDE 20 MG: 20 TABLET ORAL at 08:16

## 2020-12-30 RX ADMIN — FUROSEMIDE 20 MG: 20 TABLET ORAL at 08:16

## 2020-12-30 RX ADMIN — SODIUM CHLORIDE 25 MG: 9 INJECTION, SOLUTION INTRAVENOUS at 22:28

## 2020-12-30 RX ADMIN — SEVELAMER CARBONATE 1600 MG: 800 TABLET, FILM COATED ORAL at 12:34

## 2020-12-30 RX ADMIN — MICONAZOLE NITRATE: 2 OINTMENT TOPICAL at 08:16

## 2020-12-30 RX ADMIN — CALCITRIOL 0.25 MCG: 0.25 CAPSULE ORAL at 22:27

## 2020-12-30 RX ADMIN — SODIUM CHLORIDE, PRESERVATIVE FREE 10 ML: 5 INJECTION INTRAVENOUS at 20:00

## 2020-12-30 RX ADMIN — SEVELAMER CARBONATE 1600 MG: 800 TABLET, FILM COATED ORAL at 08:16

## 2020-12-30 ASSESSMENT — PAIN SCALES - GENERAL: PAINLEVEL_OUTOF10: 0

## 2020-12-30 NOTE — PROGRESS NOTES
BS this morning was 38. D5 administered per PRN orders and r/p BS was 163 . Pt has been lethargic all morning and is only to carry on a brief conversation before falling back asleep . Breathing seems heavy while pt is asleep. Vitals are stable, SpO2 95% on 5L  and BS is currently 81. Messaged Dr Eda De Dios to update him on pt's condition. New order received and he will be in to see pt again.

## 2020-12-30 NOTE — DISCHARGE SUMMARY
Physician Discharge Summary     Patient ID:  Seth Masterson  [id number]  56 y.o.  1960    Admit date: 12/4/2020    Discharge date and time: 12/08/2020  Admission Diagnoses:   Patient Active Problem List   Diagnosis    GI bleed    Acute blood loss anemia    Essential hypertension    [facility]) [facility]    [facility]    Tobacco abuse    Depression    Psychiatric disorder    Diabetes mellitus type 2, uncontrolled ([facility]    Hyperlipidemia LDL goal <100    CKD (chronic kidney disease) stage 4, GFR 15-29 ml/min ([facility])    Acute kidney injury ([facility]    Acute respiratory failure with hypoxia ([facility])    Hypoxia    COVID-19    Leg wound, left, subsequent encounter    Anemia       Discharge Diagnoses: COVID-19, acute hypoxemic respiratory failure    Consults: Pulmonology renal    Procedures: None    Hospital Course: 1: Hypoxia secondary to COVID-19  O2 requirement much improved from yesterday currently on 8 L nasal cannula  Remdesivir not able to given secondary to renal disease per pulmonology  Decadron 6 mg p.o. daily  Monitor oxygen  Pulmonology consulted  Blood cultures: No growth at 24 hours     2: Diabetes mellitus  Sliding scale changed to medium dose  Continue to monitor     3: CKD-BUN/creatinine markedly elevated though patient is not on dialysis  Monitor creatinine/BNP  Today: increased to 7.7/114  Nephrology following     4: Hypertension  Continue home medications  Monitor     5: Bi-Polar Disorder:  Continue home medications.     DVT prophylaxis  PT OT  Discharge plan-patient is okay to discharge from medicine standpoint if okay with pulmonology on p.o. Decadron         Recent Labs     12/27/20  2233 12/27/20  2233 12/28/20  1755 12/29/20  0745 12/30/20  0600   WBC 2.7*  --   --  2.6* 2.6*   HGB 6.4*   < > 7.4* 7.3* 7.3*   HCT 21.0*   < > 24.5* 23.5* 24.4*   PLT 88*  --   --  103* 118*    < > = values in this interval not displayed. Recent Labs     12/28/20  0935 12/29/20  0745 12/30/20  0600    133 134   K 4.7 4.3 4.2   CL 94* 94* 96*   CO2 28 28 27   BUN 94* 97* 100*   CREATININE 6.6* 6.7* 6.9*   CALCIUM 7.9* 8.1* 8.2*       Xr Chest Portable    Result Date: 12/27/2020  EXAMINATION: ONE XRAY VIEW OF THE CHEST 12/27/2020 9:40 pm COMPARISON: December 4 HISTORY: ORDERING SYSTEM PROVIDED HISTORY: Shortness of breath TECHNOLOGIST PROVIDED HISTORY: Reason for exam:->Shortness of breath FINDINGS: Cardiac silhouette is enlarged and unchanged. Improvement in widespread airspace disease seen on the prior examination. Decreasing airspace disease in the right upper lobe and left perihilar region. Right pleural effusion likely extending along the major fissure, no significantly changed. Probable smaller left pleural effusion. Improvement in widespread airspace disease seen on the prior examination with decreasing airspace disease in the right upper lobe and left perihilar region. Right pleural effusion, no significantly changed. Probable smaller left pleural effusion. Discharge Exam:    HEENT: NCAT,  PERRLA, No JVD  Heart:  RRR, no murmurs, gallops, or rubs.   Lungs:  CTA bilaterally, no wheeze, rales or rhonchi  Abd: bowel sounds present, nontender, nondistended, no masses  Extrem:  No clubbing, cyanosis, or edema    Disposition: [facility]     Patient Condition at Discharge: Stable    Patient Instructions:      Medication List      CONTINUE taking these medications    ARIPiprazole 5 MG tablet  Commonly known as: ABILIFY     calcium-vitamin D 500-200 MG-UNIT per tablet  Commonly known as: OSCAL-500     citalopram 10 MG tablet  Commonly known as: CELEXA     cloNIDine 0.1 MG tablet  Commonly known as: CATAPRES     dilTIAZem 240 MG extended release capsule  Commonly known as: DILACOR XR     doxepin 25 MG capsule  Commonly known as: SINEQUAN     fenofibrate 48 MG tablet  Commonly known as: TRICOR     furosemide 20 MG tablet  Commonly known

## 2020-12-31 VITALS
WEIGHT: 275.5 LBS | SYSTOLIC BLOOD PRESSURE: 128 MMHG | RESPIRATION RATE: 18 BRPM | TEMPERATURE: 97.6 F | DIASTOLIC BLOOD PRESSURE: 61 MMHG | BODY MASS INDEX: 39.44 KG/M2 | HEART RATE: 71 BPM | OXYGEN SATURATION: 98 % | HEIGHT: 70 IN

## 2020-12-31 LAB
ANION GAP SERPL CALCULATED.3IONS-SCNC: 12 MMOL/L (ref 7–16)
BUN BLDV-MCNC: 100 MG/DL (ref 8–23)
CALCIUM SERPL-MCNC: 8.7 MG/DL (ref 8.6–10.2)
CHLORIDE BLD-SCNC: 98 MMOL/L (ref 98–107)
CO2: 25 MMOL/L (ref 22–29)
CREAT SERPL-MCNC: 7 MG/DL (ref 0.7–1.2)
GFR AFRICAN AMERICAN: 10
GFR NON-AFRICAN AMERICAN: 8 ML/MIN/1.73
GLUCOSE BLD-MCNC: 47 MG/DL (ref 74–99)
HCT VFR BLD CALC: 25.8 % (ref 37–54)
HEMOGLOBIN: 7.7 G/DL (ref 12.5–16.5)
MAGNESIUM: 1.9 MG/DL (ref 1.6–2.6)
MCH RBC QN AUTO: 29.2 PG (ref 26–35)
MCHC RBC AUTO-ENTMCNC: 29.8 % (ref 32–34.5)
MCV RBC AUTO: 97.7 FL (ref 80–99.9)
METER GLUCOSE: 106 MG/DL (ref 74–99)
METER GLUCOSE: 118 MG/DL (ref 74–99)
METER GLUCOSE: 220 MG/DL (ref 74–99)
METER GLUCOSE: 230 MG/DL (ref 74–99)
PDW BLD-RTO: 16.3 FL (ref 11.5–15)
PHOSPHORUS: 7.7 MG/DL (ref 2.5–4.5)
PLATELET # BLD: 131 E9/L (ref 130–450)
PMV BLD AUTO: 9.8 FL (ref 7–12)
POTASSIUM SERPL-SCNC: 4.3 MMOL/L (ref 3.5–5)
RBC # BLD: 2.64 E12/L (ref 3.8–5.8)
SODIUM BLD-SCNC: 135 MMOL/L (ref 132–146)
WBC # BLD: 2.8 E9/L (ref 4.5–11.5)

## 2020-12-31 PROCEDURE — 6370000000 HC RX 637 (ALT 250 FOR IP): Performed by: PHYSICIAN ASSISTANT

## 2020-12-31 PROCEDURE — 36415 COLL VENOUS BLD VENIPUNCTURE: CPT

## 2020-12-31 PROCEDURE — 94660 CPAP INITIATION&MGMT: CPT

## 2020-12-31 PROCEDURE — 6370000000 HC RX 637 (ALT 250 FOR IP): Performed by: INTERNAL MEDICINE

## 2020-12-31 PROCEDURE — 2580000003 HC RX 258: Performed by: PHYSICIAN ASSISTANT

## 2020-12-31 PROCEDURE — 83735 ASSAY OF MAGNESIUM: CPT

## 2020-12-31 PROCEDURE — 6360000002 HC RX W HCPCS: Performed by: PHYSICIAN ASSISTANT

## 2020-12-31 PROCEDURE — 2580000003 HC RX 258: Performed by: INTERNAL MEDICINE

## 2020-12-31 PROCEDURE — 84100 ASSAY OF PHOSPHORUS: CPT

## 2020-12-31 PROCEDURE — 82962 GLUCOSE BLOOD TEST: CPT

## 2020-12-31 PROCEDURE — 2700000000 HC OXYGEN THERAPY PER DAY

## 2020-12-31 PROCEDURE — 85027 COMPLETE CBC AUTOMATED: CPT

## 2020-12-31 PROCEDURE — 80048 BASIC METABOLIC PNL TOTAL CA: CPT

## 2020-12-31 PROCEDURE — 6360000002 HC RX W HCPCS: Performed by: INTERNAL MEDICINE

## 2020-12-31 RX ORDER — CALCITRIOL 0.25 UG/1
0.25 CAPSULE, LIQUID FILLED ORAL EVERY OTHER DAY
Qty: 30 CAPSULE | Refills: 3 | Status: SHIPPED | OUTPATIENT
Start: 2021-01-01

## 2020-12-31 RX ORDER — FERROUS SULFATE 325(65) MG
325 TABLET ORAL
Status: DISCONTINUED | OUTPATIENT
Start: 2021-01-01 | End: 2020-12-31 | Stop reason: HOSPADM

## 2020-12-31 RX ADMIN — SODIUM CHLORIDE 100 MG: 9 INJECTION, SOLUTION INTRAVENOUS at 00:20

## 2020-12-31 RX ADMIN — PANTOPRAZOLE SODIUM 40 MG: 40 TABLET, DELAYED RELEASE ORAL at 17:50

## 2020-12-31 RX ADMIN — INSULIN GLARGINE 32 UNITS: 100 INJECTION, SOLUTION SUBCUTANEOUS at 08:19

## 2020-12-31 RX ADMIN — HEPARIN SODIUM 5000 UNITS: 10000 INJECTION INTRAVENOUS; SUBCUTANEOUS at 14:03

## 2020-12-31 RX ADMIN — CLONIDINE HYDROCHLORIDE 0.1 MG: 0.1 TABLET ORAL at 14:03

## 2020-12-31 RX ADMIN — FENOFIBRATE 54 MG: 54 TABLET ORAL at 08:17

## 2020-12-31 RX ADMIN — CLONIDINE HYDROCHLORIDE 0.1 MG: 0.1 TABLET ORAL at 08:17

## 2020-12-31 RX ADMIN — PANTOPRAZOLE SODIUM 40 MG: 40 TABLET, DELAYED RELEASE ORAL at 05:56

## 2020-12-31 RX ADMIN — HEPARIN SODIUM 5000 UNITS: 10000 INJECTION INTRAVENOUS; SUBCUTANEOUS at 05:57

## 2020-12-31 RX ADMIN — METOLAZONE 5 MG: 5 TABLET ORAL at 08:17

## 2020-12-31 RX ADMIN — SEVELAMER CARBONATE 1600 MG: 800 TABLET, FILM COATED ORAL at 17:50

## 2020-12-31 RX ADMIN — INSULIN LISPRO 2 UNITS: 100 INJECTION, SOLUTION INTRAVENOUS; SUBCUTANEOUS at 06:04

## 2020-12-31 RX ADMIN — ARIPIPRAZOLE 2.5 MG: 5 TABLET ORAL at 08:17

## 2020-12-31 RX ADMIN — SEVELAMER CARBONATE 1600 MG: 800 TABLET, FILM COATED ORAL at 14:02

## 2020-12-31 RX ADMIN — CITALOPRAM HYDROBROMIDE 20 MG: 20 TABLET ORAL at 08:17

## 2020-12-31 RX ADMIN — SEVELAMER CARBONATE 1600 MG: 800 TABLET, FILM COATED ORAL at 08:17

## 2020-12-31 RX ADMIN — SODIUM CHLORIDE, PRESERVATIVE FREE 10 ML: 5 INJECTION INTRAVENOUS at 08:18

## 2020-12-31 RX ADMIN — DILTIAZEM HYDROCHLORIDE 240 MG: 240 CAPSULE, COATED, EXTENDED RELEASE ORAL at 08:17

## 2020-12-31 RX ADMIN — MICONAZOLE NITRATE: 2 OINTMENT TOPICAL at 08:18

## 2020-12-31 ASSESSMENT — PAIN SCALES - GENERAL: PAINLEVEL_OUTOF10: 0

## 2020-12-31 NOTE — PROGRESS NOTES
Nurse to nurse report given to Danielle nEgle at [facility].

## 2020-12-31 NOTE — CARE COORDINATION
Social Work/Discharge Planning:  Discharge order noted. Patient to return to [facility] at [facility] LOC. Robina Console at [facility] and arranged Ambulance for 5:00pm.  Notified patient Mark Sparks (ph: [phone]) via voicemail of discharge time. Notified RN and left a message for Rizwana Singh at [facility] regarding discharge time.   Electronically signed by JS Estrella on 12/31/2020 at 12:27 PM

## 2020-12-31 NOTE — PROGRESS NOTES
Attempted to give nurse to nurse report to [facility], unable to speak to a nurse at this time. Left voicemail on telephone.

## 2020-12-31 NOTE — PROGRESS NOTES
Occupational Therapy  Patient treatment attempted this AM.  Patient declined session requesting to rest prior to D/C later today. Wishes respected.                                                  Randi HENSON/ARY [id number]

## 2020-12-31 NOTE — PROGRESS NOTES
Date: 12/30/2020    Time: 10:07 PM    Patient Placed On BIPAP/CPAP/ Non-Invasive Ventilation? Yes    If no must comment. Facial area red/color change? No           If YES are Blister/Lesion present? No   If yes must notify nursing staff  BIPAP/CPAP skin barrier? Yes    Skin barrier type:mepilexlite       Comments: Pt placed on BiPAP for the night, mepilex placed at this time. Machine plugged into red outlet.         Sondra Preston      12/30/20 2206   NIV Type   Mode Bilevel   Mask Type Full face mask   Mask Size Medium   Settings/Measurements   IPAP 16 cmH20   CPAP/EPAP 8 cmH2O   Rate Ordered 12   Resp 16   O2 Flow Rate (L/min) 5 L/min   Vt Exhaled 583 mL   Minute Volume 15.3 Liters   Mask Leak (lpm) 18 lpm   Comfort Level Good   Using Accessory Muscles No   SpO2 96

## 2020-12-31 NOTE — PROGRESS NOTES
[facility] MD Sandi Payne, MD Lisa Bhardwaj, MD Deja Joy, MD Kavon Faria, AMY Torres, RUTH  Progress Note  Patient's Name: Jose Alejandro Manley  2:21 PM  12/31/2020    Subjective  12/29: Stable vitals . Hg stable at 7.3   12/30: Resting comfortably. Mildly tachypneic. Somnolent, though answers questions appropriately. Denies complaint. No new complaint. Anxious for discharge so that he can \"watch the ball drop\" from the ECF. Breathing comfortably on nasal cannula. Intermittent cough, nonproductive. No fever or chill. ROS unremarkable      History of Present Ilness:      20-year-old gent with a complicated past medical history that includes Schizoaffective disorder, diabetes, hypertension, peripheral arterial disease, advanced CKD, stage V with severe proteinuria, further evaluation of which with a biopsy has foregone in the past, COPD, former smoker. He is a resident [facility]. Due to his psychiatric disorder he has a court appointed guardian who is responsible for his medical decisions. He is presenting with cc of abnormal blood work showing severe anaemia   He did receive rbc x2   Hg today 6.8   He was having lunch when seeing him . He is alert awake his chest is clear to exam and he clinically look euvolemic  When talking to him about RRT/HD he is telling me this is not an option for him . Allergies:  Patient has no known allergies.     Current Medications:        epoetin juliane-epbx (RETACRIT) injection 2,000 Units, Once per day on Mon Wed Fri    And      epoetin juliane-epbx (RETACRIT) injection 3,000 Units, Once per day on Mon Wed Fri      calcitRIOL (ROCALTROL) capsule 0.25 mcg, Every Other Day      ferric gluconate (FERRLECIT) 125 mg in sodium chloride 0.9 % 100 mL IVPB, Daily      ARIPiprazole (ABILIFY) tablet 2.5 mg, Daily      citalopram (CELEXA) tablet 20 mg, Daily      cloNIDine (CATAPRES) tablet 0.1 mg, TID    [facility] doxepin (SINEQUAN) capsule 25 mg, Nightly      furosemide (LASIX) tablet 20 mg, BID      metOLazone (ZAROXOLYN) tablet 5 mg, Daily      pantoprazole (PROTONIX) tablet 40 mg, BID AC      insulin lispro (HUMALOG) injection vial 0-6 Units, TID WC      insulin lispro (HUMALOG) injection vial 0-3 Units, Nightly      glucose (GLUTOSE) 40 % oral gel 15 g, PRN      dextrose 50 % IV solution, PRN      glucagon (rDNA) injection 1 mg, PRN      dextrose 5 % solution, PRN      sodium chloride flush 0.9 % injection 10 mL, 2 times per day      sodium chloride flush 0.9 % injection 10 mL, PRN      acetaminophen (TYLENOL) tablet 650 mg, Q6H PRN    Or      acetaminophen (TYLENOL) suppository 650 mg, Q6H PRN      polyethylene glycol (GLYCOLAX) packet 17 g, Daily PRN      trimethobenzamide (TIGAN) injection 200 mg, Q6H PRN      heparin (porcine) injection 5,000 Units, 3 times per day      dilTIAZem (CARDIZEM CD) extended release capsule 240 mg, Daily      fenofibrate (TRICOR) tablet 54 mg, Daily      insulin glargine (LANTUS) injection vial 32 Units, QAM      miconazole nitrate 2 % ointment, BID      sevelamer (RENVELA) tablet 1,600 mg, TID WC      0.9 % sodium chloride bolus, Once          Physical exam:   Vital signs /60   Pulse 78   Temp 98.3 °F (36.8 °C) (Oral)   Resp 20   Ht 5' 10\" (1.778 m)   Wt 275 lb 8 oz (125 kg)   SpO2 98%   BMI 39.53 kg/m²   Gen : in NAD , appropriate for stated age . Head : NC/AT, EOMI, sclera conjunctive are clear and anicteric. Mucous memories are moist  Neck : supple , no thyromegaly noted . Trachea midline  CV : RRR , No M/R/G . Lungs: CTAB , no wheezing. Good air entry in all fields  Abd : soft , NT , NABS  Skin : soft, dry . Neuro : CN  II-XII grossly intact , no focal neurologic deficit . Psych : cooperative .      Data:   Labs:  CBC with Differential:    Lab Results   Component Value Date    WBC 2.8 12/31/2020    RBC 2.64 12/31/2020    HGB 7.7 12/31/2020 my discussion with him and his last admission, and per my discussion with his court appointed legal guardian, no plans for dialysis  Continue supportive care  Follow labs    Electronically signed by Lissy Cortés MD on 12/31/2020

## 2020-12-31 NOTE — PROGRESS NOTES
Subjective:    Discharge held due to AMS, hypoglycemia and resp acidosis on ABG. Objective:    /60   Pulse 78   Temp 98.3 °F (36.8 °C) (Oral)   Resp 20   Ht 5' 10\" (1.778 m)   Wt 275 lb 8 oz (125 kg)   SpO2 98%   BMI 39.53 kg/m²     Current medications that patient is taking have been reviewed. Heart:  RRR, no murmurs, gallops, or rubs. Lungs:  CTA bilaterally, no wheeze, rales or rhonchi  Abd: bowel sounds present, soft, nontender, nondistended, no masses  Extrem:  No cyanosis or edema    CBC:   Lab Results   Component Value Date    WBC 2.8 12/31/2020    RBC 2.64 12/31/2020    HGB 7.7 12/31/2020    HCT 25.8 12/31/2020    MCV 97.7 12/31/2020    MCH 29.2 12/31/2020    MCHC 29.8 12/31/2020    RDW 16.3 12/31/2020     12/31/2020    MPV 9.8 12/31/2020     BMP:    Lab Results   Component Value Date     12/31/2020    K 4.3 12/31/2020    K 4.2 12/30/2020    CL 98 12/31/2020    CO2 25 12/31/2020     12/31/2020    LABALBU 2.0 12/29/2020    CREATININE 7.0 12/31/2020    CALCIUM 8.7 12/31/2020    GFRAA 10 12/31/2020    LABGLOM 8 12/31/2020    GLUCOSE 47 12/31/2020        Assessment:    Patient Active Problem List   Diagnosis    GI bleed    Acute blood loss anemia    Essential hypertension    [facility]    [facility]controlled diabetes mellitus [facility]    Tobacco abuse    Depression    Psychiatric disorder    Diabetes mellitus type 2, uncontrolled ([facility]    Hyperlipidemia LDL goal <100    CKD (chronic kidney disease) stage 4, GFR 15-29 ml/min ([facility])    Acute kidney injury ([facility]    Acute respiratory failure with hypoxia ([facility])    Hypoxia    COVID-19    Leg wound, left, subsequent encounter    Anemia       Plan:    1. Anemia - no evidence of bleeding               Likely AoCD from CKD5              Responded to 2u PRBC  2. CKD 5              Follows with nephrologist Dr. [employer] not want HD              SPEP/UPEP vit D pending  3. HTN - continue with clonidine + metaolazone + lasix  4. GERD - PPI  5.  AMS - secondary to hypoglycemia and hypercapnia   Wear BIPAP overnight      Margarita Jose    8:17 AM  12/31/2020

## 2020-12-31 NOTE — PROGRESS NOTES
[facility] MD Aidee Zarco, MD Sukh Daniels, MD Narciso Yañez, MD Kathyleen Bosworth, CNP   Choco Perez, RUTH  Progress Note  Patient's Name: Dasha Do  7:28 PM  12/30/2020    Subjective  12/29: Stable vitals . Hg stable at 7.3   12/30: Resting comfortably. Mildly tachypneic. Somnolent, though answers questions appropriately. Denies complaint. History of Present Ilness:      56-year-old gent with a complicated past medical history that includes Schizoaffective disorder, diabetes, hypertension, peripheral arterial disease, advanced CKD, stage V with severe proteinuria, further evaluation of which with a biopsy has foregone in the past, COPD, former smoker. He is a resident IV wounds. Due to his psychiatric disorder he has a court appointed guardian who is responsible for his medical decisions. He is presenting with cc of abnormal blood work showing severe anaemia   He did receive rbc x2   Hg today 6.8   He was having lunch when seeing him . He is alert awake his chest is clear to exam and he clinically look euvolemic  When talking to him about RRT/HD he is telling me this is not an option for him . Allergies:  Patient has no known allergies.     Current Medications:        epoetin juliane-epbx (RETACRIT) injection 2,000 Units, Once per day on Mon Wed Fri    And      epoetin juliane-epbx (RETACRIT) injection 3,000 Units, Once per day on Mon Wed Fri      ARIPiprazole (ABILIFY) tablet 2.5 mg, Daily      citalopram (CELEXA) tablet 20 mg, Daily      cloNIDine (CATAPRES) tablet 0.1 mg, TID      doxepin (SINEQUAN) capsule 25 mg, Nightly      furosemide (LASIX) tablet 20 mg, BID      metOLazone (ZAROXOLYN) tablet 5 mg, Daily      pantoprazole (PROTONIX) tablet 40 mg, BID AC      insulin lispro (HUMALOG) injection vial 0-6 Units, TID WC      insulin lispro (HUMALOG) injection vial 0-3 Units, Nightly      glucose (GLUTOSE) 40 % oral gel 15 g,

## 2021-01-11 NOTE — DISCHARGE SUMMARY
Physician Discharge Summary     Patient ID:  Jodi Clements  [id number]  56 y.o.  1960    Admit date: 12/27/2020    Discharge date and time:  12/31 7pm    Admission Diagnoses:   Patient Active Problem List   Diagnosis    GI bleed    Acute blood loss anemia    Essential hypertension    [facility]    [facility]    Tobacco abuse    Depression    Psychiatric disorder    Diabetes mellitus type 2, uncontrolled ([facility]    Hyperlipidemia LDL goal <100    CKD (chronic kidney disease) stage 4, GFR 15-29 ml/min ([facility])    Acute kidney injury ([facility]    Acute respiratory failure with hypoxia ([facility])    Hypoxia    COVID-19    Leg wound, left, subsequent encounter    Anemia       Discharge Diagnoses: Anemia    Consults: nephrology    Procedures: None    Hospital Course:      1. Anemia - no evidence of bleeding               Likely AoCD from CKD5. Responded to 2u PRBC. Started on feosol  2. CKD 5              Follows with nephrologist Dr. Bonifacio Browning. Does not want HD  3. HTN - continue with clonidine + metaolazone + lasix  4. GERD - PPI  5. AMS - secondary to hypoglycemia and hypercapnia. Patient wears BIPAP overnight, improved after BIPAP.     Discharge Exam:  Gen - NAD AAOx3  CV - RRR s1/s2, no M/R/G  Pulm - Fair air entry bilaterally, no wheeze  Abd - Soft NT ND  Ext - No LE edema    Condition:  Stable    Disposition: long term care facility    Patient Instructions:   Discharge Medication List as of 12/31/2020  6:08 PM      START taking these medications    Details   calcitRIOL (ROCALTROL) 0.25 MCG capsule Take 1 capsule by mouth every other day, Disp-30 capsule, R-3Normal      ferrous sulfate (IRON 325) 325 (65 Fe) MG tablet Take 1 tablet by mouth daily (with breakfast), Disp-30 tablet, R-0Normal      sevelamer (RENVELA) 800 MG tablet Take 2 tablets by mouth 3 times daily (with meals), Disp-90 tablet, R-3Normal         CONTINUE these medications which have NOT CHANGED    Details   vitamin D (CHOLECALCIFEROL) 125 MCG (5000 UT) CAPS capsule Take 5,000 Units by mouth dailyHistorical Med      aspirin 81 MG chewable tablet Take 81 mg by mouth 2 times dailyHistorical Med      Multiple Vitamins-Minerals (MULTIVITAMIN ADULT PO) Take 1 tablet by mouth every morningHistorical Med      insulin aspart (NOVOLOG) 100 UNIT/ML injection vial Inject 2-8 Units into the skin 3 times daily (before meals)Historical Med      acetaminophen (TYLENOL) 325 MG tablet Take 650 mg by mouth every 4 hours as needed for Pain or FeverHistorical Med      ipratropium-albuterol (DUONEB) 0.5-2.5 (3) MG/3ML SOLN nebulizer solution Inhale 1 vial into the lungs every 3 hours as needed for Shortness of BreathHistorical Med      metOLazone (ZAROXOLYN) 5 MG tablet Take 5 mg by mouth daily Monday, Wednesday, fridayHistorical Med      pantoprazole (PROTONIX) 40 MG tablet Take 1 tablet by mouth 2 times daily (before meals), Disp-60 tablet,R-2Print      ARIPiprazole (ABILIFY) 5 MG tablet Take 2.5 mg by mouth dailyHistorical Med      citalopram (CELEXA) 10 MG tablet Take 20 mg by mouth dailyHistorical Med      cloNIDine (CATAPRES) 0.1 MG tablet Take 0.1 mg by mouth three times dailyHistorical Med      dilTIAZem (DILACOR XR) 240 MG extended release capsule Take 240 mg by mouth dailyHistorical Med      doxepin (SINEQUAN) 25 MG capsule Take 25 mg by mouth nightlyHistorical Med      fenofibrate (TRICOR) 48 MG tablet Take 48 mg by mouth nightlyHistorical Med      furosemide (LASIX) 20 MG tablet Take 20 mg by mouth 2 times daily Starts 9/8/20Historical Med      insulin detemir (LEVEMIR) 100 UNIT/ML injection vial Inject 32 Units into the skin every morning Historical Med      triamcinolone (KENALOG) 0.1 % cream Apply topically 2 times daily Apply topically 2 times daily.  legs, Topical, 2 TIMES DAILY, Historical Med         STOP taking these medications       calcium-vitamin D (OSCAL-500) 500-200 MG-UNIT per tablet Comments:   Reason for Stopping:             Activity: ambulate in house  Diet: renal diet    Follow-up with PCP in 1 week    Note that over 30 minutes was spent in preparing discharge papers, discussing discharge with patient, medication review, etc.    Signed:  Misa Thorne    1/10/2021  8:29 PM

## 2023-10-08 NOTE — CONSULTS
Pulmonary Consultation    Admit Date: 12/4/2020  Requesting Physician: Jenny Sutton DO    Reason for consultation:  · COVID-19 pneumonia  HPI:  · Cele Mead is a 39-year-old white male who initially presented to this institution unresponsive. He is known to have significant kidney disease and was found to be hypoglycemic. He is currently admitted to a stepdown area where his mentation is improved dramatically. This p.m., he is eating dinner. The patient is awake and alert. · Imaging and testing evidence COVID-19 pneumonia. For this, the patient has BiPAP at the bedside and is on supplemental oxygen as he eats. He denies any shortness of breath. He has no cough fever chills and no pre-existing pulmonary conditions. He is a current everyday smoker however.     PMH:    Past Medical History:   Diagnosis Date    Anemia in chronic kidney disease 2020    Arthropathy, unspecified     Below knee amputation ([address] 2002    right     Bifascicular block 2010    Bipolar disorder, unspecified ([address] 2002    Cellulitis 2017    LLE    CHF (congestive heart failure) ([address]     Chronic kidney disease, stage 5 ([address] 10/16/2020    COPD (chronic obstructive pulmonary disease) ([address]     Diabetes mellitus ([address]     Fascicular block 2010    Generalized muscle weakness 2018    Hyperlipidemia     Hypertension     Intracranial injury without loss of consciousness ([address] 2002    Osteoporosis 2003    Peripheral vascular disease ([address]     Personality disorder ([address]     Presence of intraocular lens 2009    unspecified eye    Right bundle branch block 2010    Schizoaffective disorder ([address]     Splenomegaly 2010    Unspecified age-related cataract 2002    unspecified eye    Venous insufficiency (chronic) (peripheral) 2007    Vitamin D deficiency 2018     PSH:   Past Surgical History:   Procedure Laterality Date    UPPER GASTROINTESTINAL ENDOSCOPY N/A 9/4/2020    EGD ESOPHAGOGASTRODUODENOSCOPY English performed by Petar Araujo MD at [facility] ENDOSCOPY       Review of Systems:   · Constitutional: As noted in the HPI. · Eyes: No visual changes or diplopia. No scleral icterus. · ENT: No headaches, hearing loss or vertigo. No nasal congestion, or sore throat. · Cardiovascular: No chest pain, dyspnea on exertion, or palpitations. · Respiratory: See above  · Gastrointestinal: No abdominal pain, nausea or emesis. No diarrhea or rectal bleeding or melena. No change in bowel habits. · Genitourinary: No dysuria, urinary frequency, or incontinence. No hematuria. · Musculoskeletal: No gait disturbance, weakness or joint complaints. · Integumentary: No rash or pruritis. No abnormal pigmentation, hair or nail changes. · Neurological: No headache, diplopia, dizziness, tremor, change in muscle strength, numbness or tingling. No change in gait, balance, coordination, mood, affect, memory, mentation, behavior. · Psychiatric: No anxiety or depression. · Endocrine: No temperature intolerance, excessive thirst, fluid intake, urinary frequency, excessive appetite, or recent weight change. · Hematologic/Lymphatic: No abnormal bruising or bleeding, blood clots or swollen lymph nodes. No anemia, fever, chills, night sweats, or swollen glands. · Allergic/Immunologic: No seasonal or perenial allergies. No history of hives or atopic dermatitis.     Social History:  · Alcohol:  No  · Tobacco:   Ongoing  · Employment:  no silica or asbestos exposure  · Family:  No family history of lung disease    Medications:   sodium chloride 100 mL/hr at 12/06/20 2000    dextrose        Calcium Acetate (Phos Binder)  1,334 mg Oral TID     vitamin D3  1,000 Units Oral Daily    epoetin juliane-epbx  6,000 Units Subcutaneous Once per day on Mon Wed Fri    ARIPiprazole  2.5 mg Oral Daily    vitamin D  4,000 Units Oral Daily    citalopram  20 mg Oral Daily    cloNIDine  0.1 mg Oral TID    triamcinolone   Topical BID    sodium chloride ALB:3,BILIDIR:3,BILITOT:3,ALKPHOS:3)@    PT/INR:   Recent Labs     12/05/20  0600 12/06/20  0310 12/07/20  0245   PROTIME 15.1* 15.8* 14.9*   INR 1.3 1.3 1.3       Cultures:  Sputum: not available  Blood: not available    ABG:   Recent Labs     12/04/20  2225   PH 7.296*   PO2 132.0*   PCO2 84.0*   HCO3 40.0*   BE 11.6*   O2SAT 98.2   METHB 0.1   O2HB 96.6   COHB 1.5   O2CON 12.0   HHB 1.8   THB 8.6*     FiO2 : 40 %       Films:     US RETROPERITONEAL COMPLETE   Final Result   Echogenic kidneys, suspicious of mild chronic renal parenchymal disease. Large left renal cyst.      CT CHEST WO CONTRAST   Final Result   Bilateral infiltrates more extensive in the right lung most likely   representing pneumonia. There are also moderate bilateral pleural effusions   greater on the right. Coronary artery/atherosclerotic disease. Cardiomegaly and pericardial   effusion   Splenomegaly   10 cm low-density mass with peripheral calcifications seen in the region of   the pancreas producing mass effect on the stomach. RECOMMENDATIONS:   Recommend follow-up CT of the abdomen and pelvis with contrast for further   evaluation   FINDINGS:   Mediastinum:   Lungs/pleura:   Upper Abdomen:   Soft Tissues/Bones:      XR CHEST PORTABLE   Final Result   Moderate cardiomegaly possibly with mild vascular congestion   New bilateral mid lung infiltrates and greater density in right lung base may   be secondary to edema and/or multifocal pneumonia   Small right pleural effusion may be chronic or recurrent. There may be a   component of atelectasis in the right lung base. .        Assessment:  1. COVID-19 pneumonia  2. CKD 5  3. Transient hypoglycemia with decreased mentation  4. Ongoing tobacco abuse      Plan:  1. Continue dexamethasone  2. Remdesivir is on hold, GFR is 8. The patient is on dialysis. With any worsening, should be started.         3. Supplemental oxygen and noninvasive ventilation as needed      Thanks for letting us see this patient in consultation. Total time in reviewing the previous admissions and records, reviewing the current x-rays, labs, and discussing with clinical staff including nursing and physicians, exceeded 50 minutes. Please contact us with any questions. Office [phone] or after hours through [employer], x [phone]. Please note that voice recognition technology was used (while wearing a Covid mandated mask) in the preparation of this note and make therefore it may contain inadvertent transcription errors. If the patient is a COVID 19 isolation patient, the above physical exam reflects that of the examining physician for the day. Praveena Rios M.D., F.C.C.P.     [facility], [address] [place] [address], [address], [facility]

## 2024-12-04 NOTE — PROGRESS NOTES
Perfect serve message sent to Dr. Gamaliel Silva regarding consult 11:54 PM     Spoke to Dr. Gamaliel Silva regarding consult. New orders received.  12:29 AM Statement Selected

## (undated) DEVICE — GRADUATE TRIANG MEASURE 1000ML BLK PRNT

## (undated) DEVICE — BLOCK BITE 60FR RUBBER ADLT DENTAL

## (undated) DEVICE — SPONGE GZ W4XL4IN RAYON POLY FILL CVR W/ NONWOVEN FAB